# Patient Record
Sex: FEMALE | Race: BLACK OR AFRICAN AMERICAN | NOT HISPANIC OR LATINO | Employment: UNEMPLOYED | URBAN - METROPOLITAN AREA
[De-identification: names, ages, dates, MRNs, and addresses within clinical notes are randomized per-mention and may not be internally consistent; named-entity substitution may affect disease eponyms.]

---

## 2017-04-03 ENCOUNTER — OFFICE VISIT (OUTPATIENT)
Dept: OPHTHALMOLOGY | Facility: CLINIC | Age: 22
End: 2017-04-03
Attending: OPHTHALMOLOGY
Payer: COMMERCIAL

## 2017-04-03 ENCOUNTER — TELEPHONE (OUTPATIENT)
Dept: OPHTHALMOLOGY | Facility: CLINIC | Age: 22
End: 2017-04-03

## 2017-04-03 DIAGNOSIS — H04.123 DRY EYES, BILATERAL: ICD-10-CM

## 2017-04-03 DIAGNOSIS — H11.133: ICD-10-CM

## 2017-04-03 DIAGNOSIS — H16.423 PANNUS (CORNEAL), BILATERAL: ICD-10-CM

## 2017-04-03 DIAGNOSIS — H18.893 LIMBAL STEM CELL DEFICIENCY, BILATERAL: Primary | ICD-10-CM

## 2017-04-03 DIAGNOSIS — H16.9 KERATITIS: ICD-10-CM

## 2017-04-03 PROCEDURE — 92285 EXTERNAL OCULAR PHOTOGRAPHY: CPT | Mod: ZF | Performed by: OPHTHALMOLOGY

## 2017-04-03 PROCEDURE — 99214 OFFICE O/P EST MOD 30 MIN: CPT | Mod: 25

## 2017-04-03 PROCEDURE — 68761 CLOSE TEAR DUCT OPENING: CPT | Mod: ZF | Performed by: OPHTHALMOLOGY

## 2017-04-03 RX ORDER — CYCLOSPORINE 0.5 MG/ML
1 EMULSION OPHTHALMIC 2 TIMES DAILY
Qty: 3 BOX | Refills: 3 | Status: SHIPPED | OUTPATIENT
Start: 2017-04-03 | End: 2018-02-21 | Stop reason: DRUGHIGH

## 2017-04-03 ASSESSMENT — REFRACTION_WEARINGRX
OD_AXIS: 35
OD_SPHERE: -1.50
OS_CYLINDER: +0.75
OD_CYLINDER: +1.00
OS_AXIS: 100
OS_SPHERE: -1.50

## 2017-04-03 ASSESSMENT — VISUAL ACUITY
OD_SC+: -2
OD_PH_SC: 20/60+2
OS_SC: 20/40
OD_SC: 20/60
OS_SC+: +2
METHOD: SNELLEN - LINEAR

## 2017-04-03 ASSESSMENT — CUP TO DISC RATIO
OS_RATIO: 0.5
OD_RATIO: 0.4

## 2017-04-03 ASSESSMENT — TONOMETRY
OD_IOP_MMHG: 11
IOP_METHOD: ICARE
OS_IOP_MMHG: 14

## 2017-04-03 NOTE — MR AVS SNAPSHOT
After Visit Summary   4/3/2017    Chloe Cleaning    MRN: [id number]           Patient Information     Date Of Birth          1995        Visit Information        Provider Department      4/3/2017 7:30 AM Rey Gonzalez MD; [facility]        Today's Diagnoses     Limbal stem cell deficiency, bilateral    -  1    Dry eyes, bilateral        Conjunctival melanosis, bilateral        Pannus (corneal), bilateral        Keratitis - Both Eyes           Follow-ups after your visit        Follow-up notes from your care team     Return in about 2 weeks (around 4/17/2017) for Oculoplastics assessment for eyelid surgery.      Your next 10 appointments already scheduled     Apr 18, 2017  8:00 AM CDT   (Arrive by 7:45 AM)   NEW PLASTICS with Jessi Chapman MD   [facility] ([facility] [place])    [address] [place] [id number] [phone]              Who to contact     Please call your clinic at [phone] to:    Ask questions about your health    Make or cancel appointments    Discuss your medicines    Learn about your test results    Speak to your doctor   If you have compliments or concerns about an experience at your clinic, or if you wish to file a complaint, please contact [facility] Patient Relations at [phone] or email us at [email or web address]         Additional Information About Your Visit        MyChart Information     Kl[employer] is an electronic gateway that provides easy, online access to your medical records. With [employer], you can request a clinic appointment, read your test results, renew a prescription or communicate with your care team.     To sign up for [employer] visit the website at [email or web address]   You will be asked to enter the access code listed below, as well as some personal information. Please follow the directions to create your username and  password.     Your access code is: [email or web address]  Expires: 2017  8:30 AM     Your access code will  in 90 days. If you need help or a new code, please contact your [facility] or call [phone] for assistance.        Care EveryWhere ID     This is your Care EveryWhere ID. This could be used by other organizations to access your [place] medical records  [id number]         Blood Pressure from Last 3 Encounters:   10/24/16 112/70   16 112/70   16 100/72    Weight from Last 3 Encounters:   10/24/16 71.8 kg (158 lb 4.8 oz)   16 68 kg (150 lb)   16 74.5 kg (164 lb 3.2 oz)              We Performed the Following     Punctal Closure, Plugs     Slit Lamp Photos OU (both eyes)          Today's Medication Changes          These changes are accurate as of: 4/3/17  9:40 AM.  If you have any questions, ask your nurse or doctor.               These medicines have changed or have updated prescriptions.        Dose/Directions    * carboxymethylcellulose 1 % ophthalmic solution   Commonly known as:  CELLUVISC/REFRESH LIQUIGEL   This may have changed:  Another medication with the same name was added. Make sure you understand how and when to take each.   Used for:  Dry eyes, bilateral, Limbal stem cell deficiency, bilateral, Pannus (corneal), bilateral   Changed by:  Rey Gonzalez MD        Dose:  1 drop   Place 1 drop into both eyes 4 times daily Dispose of dropperette within 24 hours after opening or if the tip is contaminated.   Quantity:  90 each   Refills:  11       * carboxymethylcellulose 1 % ophthalmic solution   Commonly known as:  CELLUVISC/REFRESH LIQUIGEL   This may have changed:  Another medication with the same name was added. Make sure you understand how and when to take each.   Used for:  Limbal stem cell deficiency, bilateral, Dry eyes, bilateral   Changed by:  Rey Gonzalez MD        Dose:  1 drop   Place 1 drop into both eyes every  hour (while awake) Dispose of dropperette within 24 hours after opening or if the tip is contaminated.   Quantity:  90 each   Refills:  4       * carboxymethylcellulose 1 % ophthalmic solution   Commonly known as:  CELLUVISC/REFRESH LIQUIGEL   This may have changed:  You were already taking a medication with the same name, and this prescription was added. Make sure you understand how and when to take each.   Used for:  Dry eyes, bilateral, Limbal stem cell deficiency, bilateral   Changed by:  Rey Gonzalez MD        Dose:  1 drop   Place 1 drop into both eyes every hour (while awake) Dispose of dropperette within 24 hours after opening or if the tip is contaminated.   Quantity:  90 each   Refills:  4       * cycloSPORINE 0.05 % ophthalmic emulsion   Commonly known as:  RESTASIS   This may have changed:  Another medication with the same name was added. Make sure you understand how and when to take each.   Used for:  Dry eyes, bilateral, Limbal stem cell deficiency, bilateral, Keratitis, Pannus (corneal), bilateral   Changed by:  Rey Gonzalez MD        Dose:  1 drop   Place 1 drop into both eyes 2 times daily Note: it is normal for these eye drops to burn. Discard individual dropperettes within 24 hours of opening or if tip is contaminated.   Quantity:  1 each   Refills:  11       * cycloSPORINE 0.05 % ophthalmic emulsion   Commonly known as:  RESTASIS   This may have changed:  You were already taking a medication with the same name, and this prescription was added. Make sure you understand how and when to take each.   Used for:  Dry eyes, bilateral, Limbal stem cell deficiency, bilateral   Changed by:  Rey Gonzalez MD        Dose:  1 drop   Place 1 drop into both eyes 2 times daily   Quantity:  3 Box   Refills:  3       * Notice:  This list has 5 medication(s) that are the same as other medications prescribed for you. Read the directions carefully, and ask your doctor or other care provider to  review them with you.         Where to get your medicines      These medications were sent to [facility] [address] [place] [address] - [address] [facility]  [address] [facility] [address]     Phone:  [phone]     carboxymethylcellulose 1 % ophthalmic solution    cycloSPORINE 0.05 % ophthalmic emulsion                Primary Care Provider    Physician No Ref-Primary       No address on file        Thank you!     Thank you for choosing EYE CLINIC  for your care. Our goal is always to provide you with excellent care. Hearing back from our patients is one way we can continue to improve our services. Please take a few minutes to complete the written survey that you may receive in the mail after your visit with us. Thank you!             Your Updated Medication List - Protect others around you: Learn how to safely use, store and throw away your medicines at www.disposemymeds.org.          This list is accurate as of: 4/3/17  9:40 AM.  Always use your most recent med list.                   Brand Name Dispense Instructions for use    ADVIL PO          * carboxymethylcellulose 1 % ophthalmic solution    CELLUVISC/REFRESH LIQUIGEL    90 each    Place 1 drop into both eyes 4 times daily Dispose of dropperette within 24 hours after opening or if the tip is contaminated.       * carboxymethylcellulose 1 % ophthalmic solution    CELLUVISC/REFRESH LIQUIGEL    90 each    Place 1 drop into both eyes every hour (while awake) Dispose of dropperette within 24 hours after opening or if the tip is contaminated.       * carboxymethylcellulose 1 % ophthalmic solution    CELLUVISC/REFRESH LIQUIGEL    90 each    Place 1 drop into both eyes every hour (while awake) Dispose of dropperette within 24 hours after opening or if the tip is contaminated.       * cycloSPORINE 0.05 % ophthalmic emulsion    RESTASIS    1 each    Place 1 drop into both eyes 2 times daily Note: it is normal for these eye drops to burn. Discard  individual dropperettes within 24 hours of opening or if tip is contaminated.       * cycloSPORINE 0.05 % ophthalmic emulsion    RESTASIS    3 Box    Place 1 drop into both eyes 2 times daily       erythromycin ophthalmic ointment    ROMYCIN    2 Tube    Place 1 Application into both eyes At Bedtime Ok to use throughout the day for comfort       fluorometholone 0.1 % ophthalmic susp    FML LIQUIFILM    15 mL    Place 1 drop into both eyes 4 times daily       * HYDROcodone-acetaminophen 5-325 MG per tablet    NORCO    15 tablet    Take 1-2 tablets by mouth every 4 hours as needed for moderate to severe pain       * HYDROcodone-acetaminophen 5-325 MG per tablet    NORCO    20 tablet    Take 1-2 tablets by mouth every 4 hours as needed       * hypromellose 0.3 % Gel ophthalmic gel    GENTEAL    10 g    Place 0.5 g (0.5 inches) into both eyes At Bedtime Apply approximately a half inch ribbon of ointment to the inside of your lower lids. Warning, application of the ointment to your eyes will cause temporary blurring of your vision from the ointment coating your eye. Please apply right before bedtime.       * hypromellose 0.3 % Gel ophthalmic gel    GENTEAL    10 g    Place 0.5 g (0.5 inches) into both eyes At Bedtime Apply approximately a half inch ribbon of ointment to the inside of your lower lids. Warning, application of the ointment to your eyes will cause temporary blurring of your vision from the ointment coating your eye. Please apply right before bedtime.       SUMAtriptan 100 MG tablet    IMITREX         TYLENOL PO          * Notice:  This list has 9 medication(s) that are the same as other medications prescribed for you. Read the directions carefully, and ask your doctor or other care provider to review them with you.

## 2017-04-03 NOTE — PROGRESS NOTES
CC: LSCD OU    HPI: 21 year old [place] female referred by Dr. Santizo for evaluation.  She states that both eyes have been tearing and painful for over 10 years.    Interval History: Eyes red, itching, burning, dry eyes - artificial tears help. No flashes, floaters, diplopia. Patient feels vision is about the same    Previously using:  PFATs QID OU  Restasis BID OU    Assessment and Plan:    1. Limbal Stem Cell Deficiency, both eyes. Baseline slit lamp photos 11/2015.   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 Mac   - restart Preservative free artificial tears Q1H OU   - continue Restasis OU twice a day    - minimize all ocular surface toxicity   - recommend replacing punctal plugs collagen BLL today - patient wishes to proceed   - May be a candidate for SK with AMT grafting if fails medical treatment to reverse early LSCD   - would recommend evaluation with oculoplastics for lifting lower lids - chronic exposure due to LL lag    ~~~~~~~~~~~~~~~~~~~~~~~~~~~~~~~~~~~~~~~~~~~~~~~~~~~~~~~~~~~~~~~~    Complete documentation of historical and exam elements from today's encounter can be found in the full encounter summary report (not reduplicated in this progress note). I personally obtained the chief complaint(s) and history of present illness.  I confirmed and edited as necessary the review of systems, past medical/surgical history, family history, social history, and examination findings as documented by others; and I examined the patient myself. I personally reviewed the relevant tests, images, and reports as documented above. I formulated and edited as necessary the assessment and plan and discussed the findings and management  plan with the patient and family.    I personally viewed the [imaging] and I agree with the interpretation as documented by the resident/fellow and edited by me as appropriate.    I was present for the entire procedure.      Rey Gonzalez MD        I personally spent great than 40min with the patient, of which >50% of the time was spent face to face with the patient, counseling and coordinating care with the patient. We discussed the complexity of her diagnosis, the need for further information prior to proceeding with yet another surgery, and the unknown prognosis for the patient at this time.    Rey Gonzalez MD

## 2017-04-03 NOTE — NURSING NOTE
Chief Complaints and History of Present Illnesses   Patient presents with     Follow Up For     6 month follow up both eyes.     HPI    Affected eye(s):  Both   Symptoms:     No floaters   No flashes   No redness   Dryness         Do you have eye pain now?:  No      Comments:  Pt states that vision is not as clear as it used to be. Pt feeling irritation in both eyes, but does get relief with drops.    Tatianna CASTILLO April 3, 2017 7:59 AM

## 2017-05-02 ENCOUNTER — OFFICE VISIT (OUTPATIENT)
Dept: OPHTHALMOLOGY | Facility: CLINIC | Age: 22
End: 2017-05-02

## 2017-05-02 DIAGNOSIS — H16.9 KERATITIS: ICD-10-CM

## 2017-05-02 DIAGNOSIS — H16.423 PANNUS (CORNEAL), BILATERAL: ICD-10-CM

## 2017-05-02 DIAGNOSIS — H11.133: ICD-10-CM

## 2017-05-02 DIAGNOSIS — H02.539 EYELID RETRACTION OR LAG: Primary | ICD-10-CM

## 2017-05-02 DIAGNOSIS — H18.893 LIMBAL STEM CELL DEFICIENCY, BILATERAL: ICD-10-CM

## 2017-05-02 ASSESSMENT — MARGIN REFLEX DISTANCE
OS_MRD2: 8
OD_MRD1: -2
OS_MRD1: -2
OD_MRD2: 8

## 2017-05-02 ASSESSMENT — TONOMETRY
OS_IOP_MMHG: 15
IOP_METHOD: ICARE
OD_IOP_MMHG: 12

## 2017-05-02 ASSESSMENT — VISUAL ACUITY
OD_SC+: -3
OS_SC+: -1
METHOD: SNELLEN - LINEAR
OD_SC: 20/40
OS_SC: 20/50

## 2017-05-02 ASSESSMENT — LAGOPHTHALMOS
OD_LAGOPHTHALMOS: 0
OS_LAGOPHTHALMOS: 0

## 2017-05-02 ASSESSMENT — CONF VISUAL FIELD
OS_NORMAL: 1
OD_NORMAL: 1

## 2017-05-02 ASSESSMENT — LEVATOR FUNCTION
OD_LEVATOR: 8
OS_LEVATOR: 8

## 2017-05-02 NOTE — LETTER
2017         RE:  :  MRN: Chloe Cleaning  1995  [id number]     Dear Dr. Gonzalez,    Thank you for allowing me to see your patient, Chloe Cleaning, for an oculoplastic   consultation.  My assessment and plan are below.  For further details, please see my attached clinic note.      Chief Complaints and History of Present Illnesses   Patient presents with     Follow Up For       chronic exposure due to LL lag      Referral for evaluation of both lower eyelid retraction in the setting of limbal stem cell deficiency and corneal scars. Her mother notes it has been many years she has had trouble with her eyes. Maybe since she was 7-8 years of age. Her mother provides nearly all of the history through the  today. She did follow commands but did not personally answer any of my questions.     She does have a history of a neurological condition, for which she is being evaluated at [facility]. I do not have access to their notes, but a recent MRI report from [facility] mentions numerous white matter lesions possibly indicative of ADEM.            Assessment & Plan     Chloe Cleaning is a 21 year old female with the following diagnoses:   1. Eyelid retraction or lag - Both Eyes    2. Limbal stem cell deficiency, bilateral - Both Eyes    3. Conjunctival melanosis, bilateral - Both Eyes    4. Pannus (corneal), bilateral - Both Eyes    5. Keratitis - Both Eyes       While she has significant upper eyelid ptosis as well, we discussed that should not be addressed until her lower eyelids are in improved position and her cornea is in better shape.     Plan:  Left lower eyelid retraction repair with acellular dermis graft and temporary tarsorrhaphy followed several weeks later with right lower eyelid retraction repair in a similar fashion.             Again, thank you for allowing me to participate in the care of your patient.      Sincerely,    Jessi Chapman MD  Department of Ophthalmology and Visual Neurosciences  [facility]  [place]    CC: Rey Gonzalez MD  [facility]  [address] [address]  VIA In Basket

## 2017-05-02 NOTE — MR AVS SNAPSHOT
After Visit Summary   5/2/2017    Chloe Cleaning    MRN: [id number]           Patient Information     Date Of Birth          1995        Visit Information        Provider Department      5/2/2017 7:45 AM Jessi Chapman MD; [facility]        Today's Diagnoses     Eyelid retraction or lag - Both Eyes    -  1    Limbal stem cell deficiency, bilateral - Both Eyes        Conjunctival melanosis, bilateral - Both Eyes        Pannus (corneal), bilateral - Both Eyes        Keratitis - Both Eyes           Follow-ups after your visit        Your next 10 appointments already scheduled     May 08, 2017  7:30 AM CDT   (Arrive by 7:15 AM)   PAC EVALUATION with  Pac Blaze 5   [facility] [place] ([facility])    [address] [place] [id number] [phone]            May 08, 2017  8:30 AM CDT   (Arrive by 8:15 AM)   PAC RN ASSESSMENT with  Pac Rn   [facility] [place] ([facility])    [address] [place] [id number] [phone]            May 08, 2017  9:00 AM CDT   (Arrive by 8:45 AM)   PAC Anesthesia Consult with  Pac Anesthesiologist   [facility] [place] ([facility])    [address] [place] [id number] [phone]            May 23, 2017 10:45 AM CDT   (Arrive by 10:30 AM)   Post-Op with Jessi Chapman MD   [facility] ([facility])    [address] [place] [id number] [phone]            Jun 20, 2017 11:00 AM CDT   (Arrive by 10:45 AM)   Post-Op with Jessi Chapman MD   [facility] ([facility])    [address] [place] [id number] [phone]              Who to contact     Please call your clinic at [phone]  to:    Ask questions about your health    Make or cancel appointments    Discuss your medicines    Learn about your test results    Speak to your doctor   If you have compliments or concerns about an experience at your clinic, or if you wish to file a complaint, please contact [facility] Patient Relations at [phone] or email us at [email or web address]         Additional Information About Your Visit        Assurelyhart Information     My Digital Lif[employer] is an electronic gateway that provides easy, online access to your medical records. With "[employer]", you can request a clinic appointment, read your test results, renew a prescription or communicate with your care team.     To sign up for "[employer]" visit the website at [email or web address]   You will be asked to enter the access code listed below, as well as some personal information. Please follow the directions to create your username and password.     Your access code is: [email or web address]  Expires: 2017  8:30 AM     Your access code will  in 90 days. If you need help or a new code, please contact your [facility] or call [phone] for assistance.        Care EveryWhere ID     This is your Care EveryWhere ID. This could be used by other organizations to access your [place] medical records  [id number]         Blood Pressure from Last 3 Encounters:   10/24/16 112/70   16 112/70   16 100/72    Weight from Last 3 Encounters:   10/24/16 71.8 kg (158 lb 4.8 oz)   16 68 kg (150 lb)   16 74.5 kg (164 lb 3.2 oz)              We Performed the Following     External Photos OU (both eyes)     Carolee-Operative Worksheet (Plastics)        Primary Care Provider    Physician No Ref-Primary       No address on file        Thank you!     Thank you for choosing [facility] OPHTHALMOLOGY  for your care. Our goal is always to provide you with excellent care. Hearing back from our patients is one  way we can continue to improve our services. Please take a few minutes to complete the written survey that you may receive in the mail after your visit with us. Thank you!             Your Updated Medication List - Protect others around you: Learn how to safely use, store and throw away your medicines at www.disposemymeds.org.          This list is accurate as of: 5/2/17  8:48 AM.  Always use your most recent med list.                   Brand Name Dispense Instructions for use    ADVIL PO          * carboxymethylcellulose 1 % ophthalmic solution    CELLUVISC/REFRESH LIQUIGEL    90 each    Place 1 drop into both eyes 4 times daily Dispose of dropperette within 24 hours after opening or if the tip is contaminated.       * carboxymethylcellulose 1 % ophthalmic solution    CELLUVISC/REFRESH LIQUIGEL    90 each    Place 1 drop into both eyes every hour (while awake) Dispose of dropperette within 24 hours after opening or if the tip is contaminated.       * carboxymethylcellulose 1 % ophthalmic solution    CELLUVISC/REFRESH LIQUIGEL    90 each    Place 1 drop into both eyes every hour (while awake) Dispose of dropperette within 24 hours after opening or if the tip is contaminated.       * cycloSPORINE 0.05 % ophthalmic emulsion    RESTASIS    1 each    Place 1 drop into both eyes 2 times daily Note: it is normal for these eye drops to burn. Discard individual dropperettes within 24 hours of opening or if tip is contaminated.       * cycloSPORINE 0.05 % ophthalmic emulsion    RESTASIS    3 Box    Place 1 drop into both eyes 2 times daily       erythromycin ophthalmic ointment    ROMYCIN    2 Tube    Place 1 Application into both eyes At Bedtime Ok to use throughout the day for comfort       fluorometholone 0.1 % ophthalmic susp    FML LIQUIFILM    15 mL    Place 1 drop into both eyes 4 times daily       * HYDROcodone-acetaminophen 5-325 MG per tablet    NORCO    15 tablet    Take 1-2 tablets by mouth every 4 hours as  needed for moderate to severe pain       * HYDROcodone-acetaminophen 5-325 MG per tablet    NORCO    20 tablet    Take 1-2 tablets by mouth every 4 hours as needed       * hypromellose 0.3 % Gel ophthalmic gel    GENTEAL    10 g    Place 0.5 g (0.5 inches) into both eyes At Bedtime Apply approximately a half inch ribbon of ointment to the inside of your lower lids. Warning, application of the ointment to your eyes will cause temporary blurring of your vision from the ointment coating your eye. Please apply right before bedtime.       * hypromellose 0.3 % Gel ophthalmic gel    GENTEAL    10 g    Place 0.5 g (0.5 inches) into both eyes At Bedtime Apply approximately a half inch ribbon of ointment to the inside of your lower lids. Warning, application of the ointment to your eyes will cause temporary blurring of your vision from the ointment coating your eye. Please apply right before bedtime.       SUMAtriptan 100 MG tablet    IMITREX         TYLENOL PO          * Notice:  This list has 9 medication(s) that are the same as other medications prescribed for you. Read the directions carefully, and ask your doctor or other care provider to review them with you.

## 2017-05-02 NOTE — PROGRESS NOTES
Chief Complaints and History of Present Illnesses   Patient presents with     Follow Up For     chronic exposure due to LL lag     Referral for evaluation of both lower eyelid retraction in the setting of limbal stem cell deficiency and corneal scars. Her mother notes it has been many years she has had trouble with her eyes. Maybe since she was 7-8 years of age. Her mother provides nearly all of the history through the  today. She did follow commands but did not personally answer any of my questions.     She does have a history of a neurological condition, for which she is being evaluated at [facility]. I do not have access to their notes, but a recent MRI report from [facility] mentions numerous white matter lesions possibly indicative of ADEM.          Assessment & Plan     Chloe Cleaning is a 21 year old female with the following diagnoses:   1. Eyelid retraction or lag - Both Eyes    2. Limbal stem cell deficiency, bilateral - Both Eyes    3. Conjunctival melanosis, bilateral - Both Eyes    4. Pannus (corneal), bilateral - Both Eyes    5. Keratitis - Both Eyes       While she has significant upper eyelid ptosis as well, we discussed that should not be addressed until her lower eyelids are in improved position and her cornea is in better shape.     Plan:  Left lower eyelid retraction repair with acellular dermis graft and temporary tarsorrhaphy followed several weeks later with right lower eyelid retraction repair in a similar fashion.              Complete documentation of historical and exam elements from today's encounter can be found in the full encounter summary report (not reduplicated in this progress note). I personally obtained the chief complaint(s) and history of present illness.  I confirmed and edited as necessary the review of systems, past medical/surgical history, family history, social history, and examination findings as documented by others; and I examined the patient myself. I personally reviewed  the relevant tests, images, and reports as documented above. I formulated and edited as necessary the assessment and plan and discussed the findings and management plan with the patient and family. - Jessi Chapman MD    Today with Chloe Cleaning  and her mother and , I reviewed the indications, risks, benefits, and alternatives of the proposed surgical procedure including, but not limited to, failure obtain the desired result  and need for additional surgery, bleeding, infection, loss of vision, loss of the eye, and the remote possibility of permanent damage to any organ system or death with the use of anesthesia.  I provided multiple opportunities for the questions, answered all questions to the best of my ability, and confirmed that my answers and my discussion were understood.   Jessi Chapman

## 2017-05-02 NOTE — NURSING NOTE
Chief Complaints and History of Present Illnesses   Patient presents with     Follow Up For     chronic exposure due to LL lag     HPI    Affected eye(s):  Both   Symptoms:     Blurred vision   No floaters   No flashes   Redness   Tearing   Dryness   Itching         Do you have eye pain now?:  Yes   Location:  OU   Pain Level:  Worst Pain (10)   Pain Frequency:  Constant      Comments:  eval for Lifting lower lids    Patient is using Restasis BE BID  PFAT BE Q1h    Tasneem Garcia May 2, 2017 8:10 AM

## 2017-05-08 ENCOUNTER — ALLIED HEALTH/NURSE VISIT (OUTPATIENT)
Dept: SURGERY | Facility: CLINIC | Age: 22
End: 2017-05-08

## 2017-05-08 ENCOUNTER — ANESTHESIA EVENT (OUTPATIENT)
Dept: SURGERY | Facility: AMBULATORY SURGERY CENTER | Age: 22
End: 2017-05-08

## 2017-05-08 ENCOUNTER — OFFICE VISIT (OUTPATIENT)
Dept: SURGERY | Facility: CLINIC | Age: 22
End: 2017-05-08

## 2017-05-08 VITALS
HEIGHT: 61 IN | RESPIRATION RATE: 20 BRPM | OXYGEN SATURATION: 100 % | WEIGHT: 147.8 LBS | HEART RATE: 80 BPM | TEMPERATURE: 98.1 F | SYSTOLIC BLOOD PRESSURE: 112 MMHG | DIASTOLIC BLOOD PRESSURE: 79 MMHG | BODY MASS INDEX: 27.9 KG/M2

## 2017-05-08 DIAGNOSIS — H02.532 EYELID RETRACTION RIGHT LOWER EYELID: ICD-10-CM

## 2017-05-08 DIAGNOSIS — Z01.818 PRE-OP EXAMINATION: Primary | ICD-10-CM

## 2017-05-08 DIAGNOSIS — H02.535 EYELID RETRACTION LEFT LOWER EYELID: Primary | ICD-10-CM

## 2017-05-08 NOTE — ANESTHESIA PREPROCEDURE EVALUATION
Anesthesia Evaluation     . Pt has not had prior anesthetic            ROS/MED HX    ENT/Pulmonary:  - neg pulmonary ROS     Neurologic: Comment: Patient was evaluated at the [facility] for Migraines when she first came to [facility] two years ago.  MRI report from [facility] mentions numerous white matter lesions indicative of ADEM.  Tylenol works for her migraines.      (+)migraines,    (-) Multiple Sclerosis (patient denies)   Cardiovascular:  - neg cardiovascular ROS   (+) ----. : . . . :. . No previous cardiac testing       METS/Exercise Tolerance:  >4 METS   Hematologic: Comments: Hgb 11.7 7/4/2016        Musculoskeletal:   (+) , fracture lower extremity: Ankle, -       GI/Hepatic:        Other GI/Hepatic: h/o constipation.   Renal/Genitourinary:  - ROS Renal section negative       Endo:  - neg endo ROS       Psychiatric:  - neg psychiatric ROS       Infectious Disease:  - neg infectious disease ROS       Malignancy:      - no malignancy   Other:    (+) C-spine cleared: N/A, no H/O Chronic Pain,no other significant disability   - neg other ROS                 Physical Exam  Normal systems: cardiovascular and dental    Airway   Mallampati: I  TM distance: >3 FB  Neck ROM: full    Dental     Cardiovascular   Rhythm and rate: regular and normal      Pulmonary    breath sounds clear to auscultation    Other findings: Physical Exam:  Ptosis bilaterally    Lab Results      Component                Value               Date                      WBC                      6.8                 07/14/2016            Lab Results      Component                Value               Date                      RBC                      4.55                07/14/2016            Lab Results      Component                Value               Date                      HGB                      11.7                07/14/2016            Lab Results      Component                Value               Date                      HCT                       35.3                07/14/2016            Lab Results      Component                Value               Date                      MCV                      78                  07/14/2016            Lab Results      Component                Value               Date                      MCH                      25.7                07/14/2016            Lab Results      Component                Value               Date                      MCHC                     33.1                07/14/2016            Lab Results      Component                Value               Date                      RDW                      16.2                07/14/2016            Lab Results      Component                Value               Date                      PLT                      291                 07/14/2016              Last Basic Metabolic Panel:  Lab Results      Component                Value               Date                      NA                       140                 07/14/2016             Lab Results      Component                Value               Date                      POTASSIUM                3.5                 07/14/2016            Lab Results      Component                Value               Date                      CHLORIDE                 105                 07/14/2016            Lab Results      Component                Value               Date                      MARGARITA                      9.2                 07/14/2016            Lab Results      Component                Value               Date                      CO2                      22                  07/14/2016            Lab Results      Component                Value               Date                      BUN                      15                  07/14/2016            Lab Results      Component                Value               Date                      CR                       0.70                07/14/2016            Lab Results       Component                Value               Date                      GLC                      84                  07/14/2016                     PAC Discussion and Assessment    ASA Classification: 2  Case is suitable for: ASC  Anesthetic techniques and relevant risks discussed: PAC Recommendations anesthetic techniques: MAC with local anesthetic.  Invasive monitoring and risk discussed: No  Types:   Possibility and Risk of blood transfusion discussed: No  NPO instructions given:   Additional anesthetic preparation and risks discussed:   Needs early admission to pre-op area:   Other:     PAC Resident/NP Anesthesia Assessment:  Chloe Cleaning is a 21 [facility]  female scheduled for Left Lower Eyelid Retraction Repair with Acellular Dermis Graft and Temporary Tarsorrhaphy with Dr. Chapman on 6/8/2017 at [facility] [place] with combined MAC and local anesthesia as a same day surgery.    Ms. Cleaning was referred on 5/2/2017 to Dr. 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  Ms. Cleaning presents to PAC clinic with her mom and an .  She reports a history of migraines and constipation, but denies any cardiac or pulmonary history.  She denies chest pain, dyspnea, fever, chills, cough, sore throat, dysuria, hematuria and denies any change in bowel or bladder habits.  Ms. Cleaning would like to proceed with above procedure.  This is the first stage of a two stage procedure. She will return at a later date for right lower eyelid retraction repair in a similar fashion.    PAC referral for risk assessment and optimization of anesthesia with comorbid conditions of:  Limbal stem cell deficiency; corneal scars; migraines; neurological condition; & constipation.    Cardiology - RCRI : No serious cardiac risks.  0.4 % risk of major adverse cardiac event. METS >4.  Denies any cardiac history or  symptoms.  Pulmonary - no smoking.  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  Surgery planned as in HPI.    She has the following specific operative considerations:   - Anesthesia considerations:  Refer to PAC assessment in anesthesia records  - VTE risk:  0.26%  - VA # of risks 0/8 = low risk  - Risk of PONV score = 2.  If > 2, anti-emetic intervention recommended.    Arrival time, NPO, shower and medication instructions provided by nursing staff today.  Preparing For Your Surgery handout given.  Patient was discussed with Dr Humphrey. I spent 20 minutes face to face with patient, assessing, examining, and educating.      Reviewed and Signed by PAC Mid-Level Provider/Resident  Mid-Level Provider/Resident: Jessie BUCKLEY CNP  Date: 5/8/2017  Time: 8:30    Attending Anesthesiologist Anesthesia Assessment:  21 year old for eyelid surgery for eyelid retraction. Chart reviewed, patient seen and evaluated; agree with above assessment. Patient is non-English speaking and concerned re anxiety during procedure. She is otherwise healthy so should tolerate MAC well.     Patient is appropriate for the planned procedure without further workup or medical management change. The final anesthesia plan will be determined by the physician anesthesiologist caring for the patient on the day of surgery.      Reviewed and Signed by PAC Anesthesiologist  Anesthesiologist: roel  Date: 5/8/2017  Time:   Pass/Fail: Pass  Disposition:     PAC Pharmacist Assessment:        Pharmacist:   Date:   Time:      Anesthesia Plan      History & Physical Review  History and physical reviewed and following examination; no interval change.    ASA Status:  1 .    NPO  Status:  > 8 hours    Plan for General and LMA with Intravenous and Propofol induction. Maintenance will be Balanced.    PONV prophylaxis:  Ondansetron (or other 5HT-3) and Dexamethasone or Solumedrol       Postoperative Care  Postoperative pain management:  Multi-modal analgesia.      Consents  Anesthetic plan, risks, benefits and alternatives discussed with:  Patient.  Use of blood products discussed: No .   .                          .

## 2017-05-08 NOTE — PATIENT INSTRUCTIONS
Preparing for Your Surgery      Name:  Chloe Cleaning   MRN:  [id number]   :  1995   Today's Date:  2017     Arriving for surgery:  Surgery date:  17  Surgery time:  12:25 pm  Arrival time:  11 am    Please come to:   [facility]  [address] [place] [id number]     Parking is available in front of the Clinics and Surgery Center building from 5:30AM to 8:00PM.  -  Proceed to the 5th floor to check into the Ambulatory Surgery Center.              >> There will be patient concierges on the 1st and 5th floor, for assistance or an escort, if you would like.              >> Please call [phone] with any questions.  -  Bring your ID and insurance card.    What can I eat or drink?  -  You may have solid food or milk products until 8 hours prior to your surgery. (4:25 am)  -  You may have water, apple juice, BLACK coffee (NO creamer or nondairy creamer), or 7up/Sprite until 2 hours prior to your surgery. (10:25 am)    Which medicines can I take?  -Do not bring your own medications to the hospital, except for eye drops.        -  Follow Eye Clinic instructions regarding Ibuprofen. If no instructions given, NO Ibuprofen the day prior to surgery.        -  No Imitrex the day prior to surgery.    -  Please take these medications the morning of surgery:  Pt reports she takes 2 eye drops. Unsure of the names. Will bring eye drops the day of surgery. May take both eye drops the morning of surgery.  Acetaminophen (Tylenol) if needed    How do I prepare myself?  -  Take two showers: one the night before surgery; and one the morning of surgery.         Use Scrubcare or Hibiclens to wash from neck down.  You may use your own shampoo and conditioner. No other hair products.   -  Do NOT use lotion, powder, deodorant, or antiperspirant the day of your surgery.  -  Do NOT wear any makeup, fingernail polish or jewelry.    Questions or Concerns:  If you have questions or concerns,  please call the  Preoperative Assessment Center, Monday-Friday 7AM-7PM:  [phone]      AFTER YOUR SURGERY  Breathing exercises   Breathing exercises help you recover faster. Take deep breaths and let the air out slowly. This will:     Help you wake up after surgery.    Help prevent complications like pneumonia.  Preventing complications will help you go home sooner.   Nausea and vomiting   You may feel sick to your stomach after surgery; if so, let your nurse know.    Pain control:  After surgery, you may have pain. Our goal is to help you manage your pain. Pain medicine will help you feel comfortable enough to do activities that will help you heal.  These activities may include breathing exercises, walking and physical therapy.   To help your health care team treat your pain we will ask: 1) If you have pain  2) where it is located 3) describe your pain in your words  Methods of pain control include medications given by mouth, vein or by nerve block for some surgeries.  Sequential Compression Device (SCD) or Pneumo Boots:  You may need to wear SCD S on your legs or feet. These are wraps connected to a machine that pumps in air and releases it. The repeated pumping helps prevent blood clots from forming.

## 2017-05-08 NOTE — MR AVS SNAPSHOT
After Visit Summary   2017    Chloe Cleaning    MRN: [id number]           Patient Information     Date Of Birth          1995        Visit Information        Provider Department      2017 8:30 AM Rn, [facility]        Care Instructions    Preparing for Your Surgery      Name:  Chloe Cleaning   MRN:  [id number]   :  1995   Today's Date:  2017     Arriving for surgery:  Surgery date:  17  Surgery time:  12:25 pm  Arrival time:  11 am    Please come to:   [facility]  [address] [place] [id number]     Parking is available in front of the Clinics and Surgery Center building from 5:30AM to 8:00PM.  -  Proceed to the 5th floor to check into the Ambulatory Surgery Center.              >> There will be patient concierges on the 1st and 5th floor, for assistance or an escort, if you would like.              >> Please call [phone] with any questions.  -  Bring your ID and insurance card.    What can I eat or drink?  -  You may have solid food or milk products until 8 hours prior to your surgery. (4:25 am)  -  You may have water, apple juice, BLACK coffee (NO creamer or nondairy creamer), or 7up/Sprite until 2 hours prior to your surgery. (10:25 am)    Which medicines can I take?  -Do not bring your own medications to the hospital, except for eye drops.        -  Follow Eye Clinic instructions regarding Ibuprofen. If no instructions given, NO Ibuprofen the day prior to surgery.        -  No Imitrex the day prior to surgery.    -  Please take these medications the morning of surgery:  Pt reports she takes 2 eye drops. Unsure of the names. Will bring eye drops the day of surgery. May take both eye drops the morning of surgery.  Acetaminophen (Tylenol) if needed    How do I prepare myself?  -  Take two showers: one the night before surgery; and one the morning of surgery.         Use Scrubcare or Hibiclens to  wash from neck down.  You may use your own shampoo and conditioner. No other hair products.   -  Do NOT use lotion, powder, deodorant, or antiperspirant the day of your surgery.  -  Do NOT wear any makeup, fingernail polish or jewelry.    Questions or Concerns:  If you have questions or concerns, please call the  Preoperative Assessment Center, Monday-Friday 7AM-7PM:  [phone]      AFTER YOUR SURGERY  Breathing exercises   Breathing exercises help you recover faster. Take deep breaths and let the air out slowly. This will:     Help you wake up after surgery.    Help prevent complications like pneumonia.  Preventing complications will help you go home sooner.   Nausea and vomiting   You may feel sick to your stomach after surgery; if so, let your nurse know.    Pain control:  After surgery, you may have pain. Our goal is to help you manage your pain. Pain medicine will help you feel comfortable enough to do activities that will help you heal.  These activities may include breathing exercises, walking and physical therapy.   To help your health care team treat your pain we will ask: 1) If you have pain  2) where it is located 3) describe your pain in your words  Methods of pain control include medications given by mouth, vein or by nerve block for some surgeries.  Sequential Compression Device (SCD) or Pneumo Boots:  You may need to wear SCD S on your legs or feet. These are wraps connected to a machine that pumps in air and releases it. The repeated pumping helps prevent blood clots from forming.                 Follow-ups after your visit        Your next 10 appointments already scheduled     May 08, 2017  9:00 AM CDT   (Arrive by 8:45 AM)   PAC Anesthesia Consult with  Pac Anesthesiologist   [facility] ([facility])    [address] [place] [id number] [phone]            May 08, 2017  9:15 AM CDT   LAB with UC LAB   [facility]  Lab ([facility])    [address] [place] [address]  1st [facility] [id number] [phone]           Patient must bring picture ID.  Patient should be prepared to give a urine specimen  Please do not eat 10-12 hours before your appointment if you are coming in fasting for labs on lipids, cholesterol, or glucose (sugar).  Pregnant women should follow their Care Team instructions. Water with medications is okay. Do not drink coffee or other fluids.   If you have concerns about taking  your medications, please ask at office or if scheduling via Greetz, send a message by clicking on Secure Messaging, Message Your Care Team.            May 11, 2017   Procedure with Jessi Chapman MD   [facility] ([facility])    [address] [place] [address]  5th [facility] [id number] [phone]           Located in the [facility] at [address] [place] [address].   parking is very convenient and highly recommended.  is a $6 flat rate fee.  Both  and self parkers should enter the main arrival plaza from [facility]; parking attendants will direct you based on your parking preference.            May 16, 2017 10:45 AM CDT   (Arrive by 10:30 AM)   Post-Op with Jessi Chapman MD   [facility] ([facility])    [address] [place] [address]  4th [facility] [id number] [phone]            Jun 08, 2017   Procedure with Jessi Chapman MD   [facility] ([facility])    [address] [place] [address]  5th [facility] [id number] [phone]           Located in the [facility] at [address] [place] [address].   parking is very convenient and highly recommended.  is a $6 flat rate fee.  Both  and self parkers should enter the main arrival plaza from [facility]; parking  attendants will direct you based on your parking preference.            2017 11:00 AM CDT   (Arrive by 10:45 AM)   Post-Op with Jessi Chapman MD   [facility]hthal[facility] ([facility])    [address] [address] [phone]              Who to contact     Please call your clinic at [phone] to:    Ask questions about your health    Make or cancel appointments    Discuss your medicines    Learn about your test results    Speak to your doctor   If you have compliments or concerns about an experience at your clinic, or if you wish to file a complaint, please contact [facility] Patient Relations at [phone] or email us at [email or web address]         Additional Information About Your Visit        Standout [employer] Payments Information     Who Works Around You is an electronic gateway that provides easy, online access to your medical records. With Who Works Around You, you can request a clinic appointment, read your test results, renew a prescription or communicate with your care team.     To sign up for Who Works Around You visit the website at [email or web address]   You will be asked to enter the access code listed below, as well as some personal information. Please follow the directions to create your username and password.     Your access code is: [email or web address]  Expires: 2017  8:30 AM     Your access code will  in 90 days. If you need help or a new code, please contact your [facility] or call [phone] for assistance.        Care EveryWhere ID     This is your Care EveryWhere ID. This could be used by other organizations to access your [place] medical records  [id number]        Your Vitals Were     Last Period                   2017 (Approximate)            Blood Pressure from Last 3 Encounters:   17 112/79   10/24/16 112/70   16 112/70    Weight from Last 3 Encounters:   17 67 kg  (147 lb 12.8 oz)   10/24/16 71.8 kg (158 lb 4.8 oz)   07/14/16 68 kg (150 lb)              Today, you had the following     No orders found for display       Primary Care Provider    Physician No Ref-Primary       No address on file        Thank you!     Thank you for choosing [facility]  for your care. Our goal is always to provide you with excellent care. Hearing back from our patients is one way we can continue to improve our services. Please take a few minutes to complete the written survey that you may receive in the mail after your visit with us. Thank you!             Your Updated Medication List - Protect others around you: Learn how to safely use, store and throw away your medicines at www.disposemymeds.org.          This list is accurate as of: 5/8/17  8:58 AM.  Always use your most recent med list.                   Brand Name Dispense Instructions for use    * carboxymethylcellulose 1 % ophthalmic solution    CELLUVISC/REFRESH LIQUIGEL    90 each    Place 1 drop into both eyes 4 times daily Dispose of dropperette within 24 hours after opening or if the tip is contaminated.       * carboxymethylcellulose 1 % ophthalmic solution    CELLUVISC/REFRESH LIQUIGEL    90 each    Place 1 drop into both eyes every hour (while awake) Dispose of dropperette within 24 hours after opening or if the tip is contaminated.       cycloSPORINE 0.05 % ophthalmic emulsion    RESTASIS    3 Box    Place 1 drop into both eyes 2 times daily       fluorometholone 0.1 % ophthalmic susp    FML LIQUIFILM    15 mL    Place 1 drop into both eyes 4 times daily       SUMAtriptan 100 MG tablet    IMITREX     at onset of headache       TYLENOL PO      Take by mouth every 4 hours as needed       * Notice:  This list has 2 medication(s) that are the same as other medications prescribed for you. Read the directions carefully, and ask your doctor or other care provider to review them with you.

## 2017-05-08 NOTE — MR AVS SNAPSHOT
After Visit Summary   5/8/2017    Chloe Cleaning    MRN: [id number]           Patient Information     Date Of Birth          1995        Visit Information        Provider Department      5/8/2017 9:00 AM Anesthesiologist, [facility]        Today's Diagnoses     Eyelid retraction left lower eyelid    -  1    Eyelid retraction right lower eyelid           Follow-ups after your visit        Your next 10 appointments already scheduled     May 16, 2017 10:30 AM CDT   Post-Op with MD BRUNA Yeung [facility] ([facility])    [address] [place] [address]  4th [facility] [address] [phone]            Jun 08, 2017   Procedure with Jessi Chapman MD   [facility] ([facility])    [address] [place] [address] [phone]           Located in the Clinics and Surgery Center at [address] [place] [address].   parking is very convenient and highly recommended.  is a $6 flat rate fee.  Both  and self parkers should enter the main arrival plaza from [facility]; parking attendants will direct you based on your parking preference.            Jun 13, 2017 11:00 AM CDT   (Arrive by 10:45 AM)   Post-Op with MD BRUNA Yeung [facility] ([facility])    [address] [place] [address] [phone]              Who to contact     Please call your clinic at [phone] to:    Ask questions about your health    Make or cancel appointments    Discuss your medicines    Learn about your test results    Speak to your doctor   If you have compliments or concerns about an experience at your clinic, or if you wish to file a complaint, please contact [facility] Patient Relations at [phone] or email us at  [email or web address]         Additional Information About Your Visit        Kruxhart Information     Littlecastt is an electronic gateway that provides easy, online access to your medical records. With Ischemix, you can request a clinic appointment, read your test results, renew a prescription or communicate with your care team.     To sign up for Ischemix visit the website at [email or web address]   You will be asked to enter the access code listed below, as well as some personal information. Please follow the directions to create your username and password.     Your access code is: [email or web address]  Expires: 2017  8:30 AM     Your access code will  in 90 days. If you need help or a new code, please contact your [facility] or call [phone] for assistance.        Care EveryWhere ID     This is your Care EveryWhere ID. This could be used by other organizations to access your [place] medical records  [id number]        Your Vitals Were     Last Period                   2017 (Approximate)            Blood Pressure from Last 3 Encounters:   17 107/69   17 112/79   10/24/16 112/70    Weight from Last 3 Encounters:   17 68 kg (150 lb)   17 67 kg (147 lb 12.8 oz)   10/24/16 71.8 kg (158 lb 4.8 oz)              Today, you had the following     No orders found for display       Primary Care Provider    Physician No Ref-Primary       No address on file        Thank you!     Thank you for choosing [facility] PREOPERATIVE ASSESSMENT [place]  for your care. Our goal is always to provide you with excellent care. Hearing back from our patients is one way we can continue to improve our services. Please take a few minutes to complete the written survey that you may receive in the mail after your visit with us. Thank you!             Your Updated Medication List - Protect others around you: Learn how to safely use, store and throw away your medicines  at www.disposemymeds.org.          This list is accurate as of: 5/8/17 11:59 PM.  Always use your most recent med list.                   Brand Name Dispense Instructions for use    * carboxymethylcellulose 1 % ophthalmic solution    CELLUVISC/REFRESH LIQUIGEL    90 each    Place 1 drop into both eyes 4 times daily Dispose of dropperette within 24 hours after opening or if the tip is contaminated.       * carboxymethylcellulose 1 % ophthalmic solution    CELLUVISC/REFRESH LIQUIGEL    90 each    Place 1 drop into both eyes every hour (while awake) Dispose of dropperette within 24 hours after opening or if the tip is contaminated.       cycloSPORINE 0.05 % ophthalmic emulsion    RESTASIS    3 Box    Place 1 drop into both eyes 2 times daily       erythromycin ophthalmic ointment    ROMYCIN    3.5 g    Apply small amount to incision sites, as directed per physician instructions.       fluorometholone 0.1 % ophthalmic susp    FML LIQUIFILM    15 mL    Place 1 drop into both eyes 4 times daily       HYDROcodone-acetaminophen 5-325 MG per tablet    NORCO    10 tablet    Take 1 tablet by mouth every 6 hours as needed for pain Maximum of 4000 mg of acetaminophen in 24 hours.       SUMAtriptan 100 MG tablet    IMITREX     at onset of headache       TYLENOL PO      Take by mouth every 4 hours as needed       * Notice:  This list has 2 medication(s) that are the same as other medications prescribed for you. Read the directions carefully, and ask your doctor or other care provider to review them with you.

## 2017-05-09 NOTE — H&P
Pre-Operative H & P     CC:  Preoperative exam to assess for increased cardiopulmonary risk while undergoing surgery and anesthesia.    Date of Encounter: 5/8/2017  Primary Care Physician:  No Ref-Primary, Physician    LAWRENCE Corona BRUNA Cleaning is a 21 [facility] who presents for pre-operative H & P in preparation for Left Lower Eyelid Retraction Repair with Acellular Dermis Graft and Temporary Tarsorrhaphy with Dr. Chapman on 6/8/2017 at [facility] with combined MAC and local anesthesia as a same day surgery.    Ms. Cleaning was referred on 5/2/2017 to Dr. 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  Ms. Cleaning presents to PAC clinic with her mom and an .  She reports a history of migraines and constipation, but denies any cardiac or pulmonary history.  She denies chest pain, dyspnea, fever, chills, cough, sore throat, dysuria, hematuria and denies any change in bowel or bladder habits.  Ms. Cleaning would like to proceed with above procedure.  This is the first stage of a two stage procedure. She will return at a later date for right lower eyelid retraction repair in a similar fashion.    PAC referral for risk assessment and optimization of anesthesia with comorbid conditions of:  Limbal stem cell deficiency; corneal scars; migraines; neurological condition; & constipation.      History is obtained from the patient, electronic health record and patient's mother.     Past Medical History  Past Medical History:   Diagnosis Date     Conjunctival melanosis, bilateral      Dry eye      Keratitis      Limbal stem cell deficiency      Pannus (corneal), bilateral        Past Surgical History  No past surgical history on file.    Hx of Blood transfusions/reactions: denies     Hx of abnormal bleeding or anti-platelet use: denies    Menstrual history: Patient's last menstrual period was  04/20/2017 (approximate).    Steroid use in the last year: denies    Personal or FH with difficulty with Anesthesia: patient does not have prior history of anesthesia.  Denies family history with difficulty with anesthesia.    Prior to Admission Medications  Current Outpatient Prescriptions   Medication Sig Dispense Refill     cycloSPORINE (RESTASIS) 0.05 % ophthalmic emulsion Place 1 drop into both eyes 2 times daily 3 Box 3     carboxymethylcellulose (CELLUVISC/REFRESH LIQUIGEL) 1 % ophthalmic solution Place 1 drop into both eyes 4 times daily Dispose of dropperette within 24 hours after opening or if the tip is contaminated. 90 each 11     fluorometholone (FML LIQUIFILM) 0.1 % ophthalmic suspension Place 1 drop into both eyes 4 times daily 15 mL 11     Acetaminophen (TYLENOL PO) Take by mouth every 4 hours as needed        carboxymethylcellulose (CELLUVISC/REFRESH LIQUIGEL) 1 % ophthalmic solution Place 1 drop into both eyes every hour (while awake) Dispose of dropperette within 24 hours after opening or if the tip is contaminated. 90 each 4     SUMAtriptan (IMITREX) 100 MG tablet at onset of headache   3       Allergies  No Known Allergies    Social History  Social History     Social History     Marital status: Single     Spouse name: N/A     Number of children: N/A     Years of education: N/A     Occupational History     unemplyed      Social History Main Topics     Smoking status: Never Smoker     Smokeless tobacco: Never Used     Alcohol use No     Drug use: No     Sexual activity: No     Other Topics Concern     Not on file     Social History Narrative       Family History  Family History   Problem Relation Age of Onset     Glaucoma No family hx of      Macular Degeneration No family hx of      ROS/MED HX    ENT/Pulmonary:  - neg pulmonary ROS     Neurologic: Comment: Patient was evaluated at the [facility] for Migraines when she first came to [place] two years ago.  MRI report from [place] mentions numerous  "white matter lesions indicative of ADEM.  Tylenol works for her migraines.      (+)migraines,    (-) Multiple Sclerosis (patient denies)   Cardiovascular:  - neg cardiovascular ROS   (+) ----. : . . . :. . No previous cardiac testing       METS/Exercise Tolerance:  >4 METS   Hematologic: Comments: Hgb 11.7 7/4/2016        Musculoskeletal:   (+) , fracture lower extremity: Ankle, -       GI/Hepatic:     (+) Other GI/Hepatic (h/o constipation)       Renal/Genitourinary:  - ROS Renal section negative       Endo:  - neg endo ROS       Psychiatric:  - neg psychiatric ROS       Infectious Disease:  - neg infectious disease ROS       Malignancy:      - no malignancy   Other:    (+) C-spine cleared: N/A, no H/O Chronic Pain,no other significant disability      Temp: 98.1  F (36.7  C) Temp src: Oral BP: 112/79 Pulse: 80   Resp: 20 SpO2: 100 %         147 lbs 12.8 oz  5' 1\"   Body mass index is 27.93 kg/(m^2).       Physical Exam  Constitutional: Awake, alert, cooperative, no apparent distress, and appears stated age.  Eyes: Pupils equal, round and reactive to light, extra ocular muscles intact, sclera clear, conjunctiva normal.  Bilateral upper lid lag.  HENT: Normocephalic, oral pharynx with moist mucus membranes, good dentition. No goiter appreciated.   Respiratory: Clear to auscultation bilaterally, no crackles or wheezing.  Cardiovascular: Regular rate and rhythm, normal S1 and S2, and no murmur noted.  Carotids +2, no bruits. No edema. Palpable pulses to radial  DP and PT arteries.   GI: Normal bowel sounds, soft, non-distended, non-tender, no masses palpated, no hepatosplenomegaly.    Lymph/Hematologic: No cervical lymphadenopathy and no supraclavicular lymphadenopathy.  Genitourinary:  na  Skin: Warm and dry.  No rashes at anticipated surgical site.   Musculoskeletal: Full ROM of neck. There is no redness, warmth, or swelling of the joints. Gross motor strength is normal.    Neurologic: Awake, alert, oriented to name, " place and time. Cranial nerves II-XII are grossly intact. Gait is normal.   Neuropsychiatric: Calm, cooperative. Normal affect.     Labs: (personally reviewed)  Lab Results   Component Value Date    WBC 6.8 07/14/2016     Lab Results   Component Value Date    RBC 4.55 07/14/2016     Lab Results   Component Value Date    HGB 11.7 07/14/2016     Lab Results   Component Value Date    HCT 35.3 07/14/2016     Lab Results   Component Value Date    MCV 78 07/14/2016     Lab Results   Component Value Date    MCH 25.7 07/14/2016     Lab Results   Component Value Date    MCHC 33.1 07/14/2016     Lab Results   Component Value Date    RDW 16.2 07/14/2016     Lab Results   Component Value Date     07/14/2016       Last Basic Metabolic Panel:  Lab Results   Component Value Date     07/14/2016      Lab Results   Component Value Date    POTASSIUM 3.5 07/14/2016     Lab Results   Component Value Date    CHLORIDE 105 07/14/2016     Lab Results   Component Value Date    MARGARITA 9.2 07/14/2016     Lab Results   Component Value Date    CO2 22 07/14/2016     Lab Results   Component Value Date    BUN 15 07/14/2016     Lab Results   Component Value Date    CR 0.70 07/14/2016     Lab Results   Component Value Date    GLC 84 07/14/2016       EKG:  does not meet ACC/AHA criteria.  Outside records reviewed from: [place]    ASSESSMENT and PLAN  Chloe Cleaning is a 21 year old female scheduled to undergo  Left Lower Eyelid Retraction Repair with Acellular Dermis Graft and Temporary Tarsorrhaphy with Dr. Chapman on 6/8/2017 at [facility] with combined MAC and local anesthesia as a same day surgery.        Cardiology - RCRI : No serious cardiac risks.  0.4 % risk of major adverse cardiac event. METS >4.  Denies any cardiac history or symptoms.  Pulmonary - no smoking.  No pulmonary history or symptoms.  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  Surgery planned as in [facility].    She has the following specific operative considerations:   - Anesthesia considerations:  Refer to PAC assessment in anesthesia records  - VTE risk:  0.26%  - VA # of risks 0/8 = low risk  - Risk of PONV score = 2.  If > 2, anti-emetic intervention recommended.    Arrival time, NPO, shower and medication instructions provided by nursing staff today.  Preparing For Your Surgery handout given.  Patient was discussed with Dr Humphrey. I spent 20 minutes face to face with patient, assessing, examining, and educating.    INA Mayers CNP  Preoperative Assessment Center  [facility]  Phone: [phone]  Fax: [phone]

## 2017-05-11 ENCOUNTER — ANESTHESIA (OUTPATIENT)
Dept: SURGERY | Facility: AMBULATORY SURGERY CENTER | Age: 22
End: 2017-05-11

## 2017-05-11 ENCOUNTER — SURGERY (OUTPATIENT)
Age: 22
End: 2017-05-11

## 2017-05-11 ENCOUNTER — HOSPITAL ENCOUNTER (OUTPATIENT)
Facility: AMBULATORY SURGERY CENTER | Age: 22
End: 2017-05-11
Attending: OPHTHALMOLOGY

## 2017-05-11 VITALS
SYSTOLIC BLOOD PRESSURE: 107 MMHG | BODY MASS INDEX: 28.32 KG/M2 | WEIGHT: 150 LBS | DIASTOLIC BLOOD PRESSURE: 69 MMHG | OXYGEN SATURATION: 100 % | RESPIRATION RATE: 14 BRPM | TEMPERATURE: 96.8 F | HEIGHT: 61 IN | HEART RATE: 72 BPM

## 2017-05-11 DIAGNOSIS — Z98.890 POSTOPERATIVE EYE STATE: Primary | ICD-10-CM

## 2017-05-11 LAB
HCG UR QL: NEGATIVE
INTERNAL QC OK POCT: YES

## 2017-05-11 RX ORDER — LIDOCAINE HYDROCHLORIDE 20 MG/ML
INJECTION, SOLUTION INFILTRATION; PERINEURAL PRN
Status: DISCONTINUED | OUTPATIENT
Start: 2017-05-11 | End: 2017-05-11

## 2017-05-11 RX ORDER — NALOXONE HYDROCHLORIDE 0.4 MG/ML
.1-.4 INJECTION, SOLUTION INTRAMUSCULAR; INTRAVENOUS; SUBCUTANEOUS
Status: DISCONTINUED | OUTPATIENT
Start: 2017-05-11 | End: 2017-05-12 | Stop reason: HOSPADM

## 2017-05-11 RX ORDER — TETRACAINE HYDROCHLORIDE 5 MG/ML
SOLUTION OPHTHALMIC PRN
Status: DISCONTINUED | OUTPATIENT
Start: 2017-05-11 | End: 2017-05-11 | Stop reason: HOSPADM

## 2017-05-11 RX ORDER — PROPOFOL 10 MG/ML
INJECTION, EMULSION INTRAVENOUS PRN
Status: DISCONTINUED | OUTPATIENT
Start: 2017-05-11 | End: 2017-05-11

## 2017-05-11 RX ORDER — FENTANYL CITRATE 50 UG/ML
25-50 INJECTION, SOLUTION INTRAMUSCULAR; INTRAVENOUS EVERY 5 MIN PRN
Status: DISCONTINUED | OUTPATIENT
Start: 2017-05-11 | End: 2017-05-12 | Stop reason: HOSPADM

## 2017-05-11 RX ORDER — FENTANYL CITRATE 50 UG/ML
INJECTION, SOLUTION INTRAMUSCULAR; INTRAVENOUS PRN
Status: DISCONTINUED | OUTPATIENT
Start: 2017-05-11 | End: 2017-05-11

## 2017-05-11 RX ORDER — SODIUM CHLORIDE, SODIUM LACTATE, POTASSIUM CHLORIDE, CALCIUM CHLORIDE 600; 310; 30; 20 MG/100ML; MG/100ML; MG/100ML; MG/100ML
INJECTION, SOLUTION INTRAVENOUS CONTINUOUS
Status: DISCONTINUED | OUTPATIENT
Start: 2017-05-11 | End: 2017-05-12 | Stop reason: HOSPADM

## 2017-05-11 RX ORDER — ACETAMINOPHEN 325 MG/1
975 TABLET ORAL ONCE
Status: COMPLETED | OUTPATIENT
Start: 2017-05-11 | End: 2017-05-11

## 2017-05-11 RX ORDER — HYDROCODONE BITARTRATE AND ACETAMINOPHEN 5; 325 MG/1; MG/1
1 TABLET ORAL
Status: DISCONTINUED | OUTPATIENT
Start: 2017-05-11 | End: 2017-05-12 | Stop reason: HOSPADM

## 2017-05-11 RX ORDER — ONDANSETRON 4 MG/1
4 TABLET, ORALLY DISINTEGRATING ORAL EVERY 30 MIN PRN
Status: DISCONTINUED | OUTPATIENT
Start: 2017-05-11 | End: 2017-05-12 | Stop reason: HOSPADM

## 2017-05-11 RX ORDER — SODIUM CHLORIDE, SODIUM LACTATE, POTASSIUM CHLORIDE, CALCIUM CHLORIDE 600; 310; 30; 20 MG/100ML; MG/100ML; MG/100ML; MG/100ML
INJECTION, SOLUTION INTRAVENOUS CONTINUOUS
Status: DISCONTINUED | OUTPATIENT
Start: 2017-05-11 | End: 2017-05-11 | Stop reason: HOSPADM

## 2017-05-11 RX ORDER — GENTAMICIN 40 MG/ML
INJECTION, SOLUTION INTRAMUSCULAR; INTRAVENOUS CONTINUOUS PRN
Status: DISCONTINUED | OUTPATIENT
Start: 2017-05-11 | End: 2017-05-11 | Stop reason: HOSPADM

## 2017-05-11 RX ORDER — ONDANSETRON 2 MG/ML
4 INJECTION INTRAMUSCULAR; INTRAVENOUS EVERY 30 MIN PRN
Status: DISCONTINUED | OUTPATIENT
Start: 2017-05-11 | End: 2017-05-12 | Stop reason: HOSPADM

## 2017-05-11 RX ORDER — HYDROCODONE BITARTRATE AND ACETAMINOPHEN 5; 325 MG/1; MG/1
1 TABLET ORAL EVERY 6 HOURS PRN
Qty: 10 TABLET | Refills: 0 | Status: SHIPPED | OUTPATIENT
Start: 2017-05-11 | End: 2017-05-16

## 2017-05-11 RX ORDER — MEPERIDINE HYDROCHLORIDE 25 MG/ML
12.5 INJECTION INTRAMUSCULAR; INTRAVENOUS; SUBCUTANEOUS
Status: DISCONTINUED | OUTPATIENT
Start: 2017-05-11 | End: 2017-05-12 | Stop reason: HOSPADM

## 2017-05-11 RX ORDER — GABAPENTIN 300 MG/1
300 CAPSULE ORAL ONCE
Status: COMPLETED | OUTPATIENT
Start: 2017-05-11 | End: 2017-05-11

## 2017-05-11 RX ORDER — ERYTHROMYCIN 5 MG/G
OINTMENT OPHTHALMIC
Qty: 3.5 G | Refills: 0 | Status: SHIPPED | OUTPATIENT
Start: 2017-05-11 | End: 2017-05-16

## 2017-05-11 RX ADMIN — GENTAMICIN 80 MG: 40 INJECTION, SOLUTION INTRAMUSCULAR; INTRAVENOUS at 12:16

## 2017-05-11 RX ADMIN — PROPOFOL 20 MG: 10 INJECTION, EMULSION INTRAVENOUS at 12:01

## 2017-05-11 RX ADMIN — PROPOFOL 20 MG: 10 INJECTION, EMULSION INTRAVENOUS at 12:02

## 2017-05-11 RX ADMIN — TETRACAINE HYDROCHLORIDE 2 DROP: 5 SOLUTION OPHTHALMIC at 12:15

## 2017-05-11 RX ADMIN — PROPOFOL 20 MG: 10 INJECTION, EMULSION INTRAVENOUS at 12:03

## 2017-05-11 RX ADMIN — PROPOFOL 20 MG: 10 INJECTION, EMULSION INTRAVENOUS at 11:59

## 2017-05-11 RX ADMIN — PROPOFOL 20 MG: 10 INJECTION, EMULSION INTRAVENOUS at 12:24

## 2017-05-11 RX ADMIN — PROPOFOL 20 MG: 10 INJECTION, EMULSION INTRAVENOUS at 12:10

## 2017-05-11 RX ADMIN — SODIUM CHLORIDE, SODIUM LACTATE, POTASSIUM CHLORIDE, CALCIUM CHLORIDE: 600; 310; 30; 20 INJECTION, SOLUTION INTRAVENOUS at 11:10

## 2017-05-11 RX ADMIN — GABAPENTIN 300 MG: 300 CAPSULE ORAL at 11:10

## 2017-05-11 RX ADMIN — PROPOFOL 20 MG: 10 INJECTION, EMULSION INTRAVENOUS at 12:06

## 2017-05-11 RX ADMIN — LIDOCAINE HYDROCHLORIDE 60 MG: 20 INJECTION, SOLUTION INFILTRATION; PERINEURAL at 11:59

## 2017-05-11 RX ADMIN — ACETAMINOPHEN 975 MG: 325 TABLET ORAL at 11:10

## 2017-05-11 RX ADMIN — FENTANYL CITRATE 50 MCG: 50 INJECTION, SOLUTION INTRAMUSCULAR; INTRAVENOUS at 11:54

## 2017-05-11 RX ADMIN — PROPOFOL 20 MG: 10 INJECTION, EMULSION INTRAVENOUS at 12:14

## 2017-05-11 RX ADMIN — PROPOFOL 20 MG: 10 INJECTION, EMULSION INTRAVENOUS at 12:22

## 2017-05-11 NOTE — IP AVS SNAPSHOT
MRN:[id number]                      After Visit Summary   5/11/2017    Chloe Cleaning    MRN: [id number]           Thank you!     Thank you for choosing [facility] for your care. Our goal is always to provide you with excellent care. Hearing back from our patients is one way we can continue to improve our services. Please take a few minutes to complete the written survey that you may receive in the mail after you visit with us. Thank you!        Patient Information     Date Of Birth          1995        About your hospital stay     You were admitted on:  May 11, 2017 You last received care in the:  [facility]    You were discharged on:  May 11, 2017       Who to Call     For medical emergencies, please call 911.  For non-urgent questions about your medical care, please call your primary care provider or clinic, None  For questions related to your surgery, please call your surgery clinic        Attending Provider     Provider Specialty    Jessi Chapman MD Ophthalmology       Primary Care Provider    Physician No Ref-Primary       No address on file        After Care Instructions     Discharge Medication Instructions       Do NOT take aspirin or medications containing NSAIDS for 72 hours after procedure.            Ice to affected area       Apply cold pack for 15 minutes on, 15 minutes off, for 48 hours while awake.                  Your next 10 appointments already scheduled     May 16, 2017 10:45 AM CDT   (Arrive by 10:30 AM)   Post-Op with Jessi Chapman MD   [facility] ([facility])    [address] [place] [address]  4th [facility] [address] [phone]            Jun 08, 2017   Procedure with Jessi Chapman MD   [facility] ([facility])    [address] [place] [address]  5th [facility] [address] [phone]           Located in the Clinics and Surgery Center at  [address] [place] [address].   parking is very convenient and highly recommended.  is a $6 flat rate fee.  Both  and self parkers should enter the main arrival plaza from [facility]; parking attendants will direct you based on your parking preference.            Jun 13, 2017 11:00 AM CDT   (Arrive by 10:45 AM)   Post-Op with Jessi Chapman MD   [facility] ([facility])    [address] [place] [address]  4th Floor  [facility] [address] [phone]              Further instructions from your care team       Post-operative Instructions  Ophthalmic Plastic and Reconstructive Surgery    Jessi Chapman M.D.     All instructions apply to the operated eye(s) or eyelid(s).    Wound care and personal care  ? If a patch or bandage has been placed, please leave this in place until seen by your physician. Ensure that the bandage does not get wet when you take a shower.  ? Apply ice compresses 15 minutes on 15 minutes off while awake for 2 days, then switch to warm water compresses 4 times a day until seen by your physician. For warm packs you can place a cup of dry uncooked rice in a clean cotton sock. Then place sock in microwave 30 seconds to one minute. Next place the warm sock into a plastic bag and wrap the bag with clean warm wet washcloth and place over operated eye.    ? You may shower or wash your hair the day after surgery. Do not bathe or go swimming for 1 week to prevent contamination of your wounds.  ? Do not apply make-up to the eyes or eyelids for 2 weeks after surgery.  ? Expect some swelling, bruising, black eye (even into the lower eyelids and cheeks). Also expect serum caking, crusting and discharge from the eye and/or incisions. A small amount of surface bleeding is normal for the first 48 hours.  ? Your eye(s) and eyelid(s) may be painful and tender. This is normal after surgery.  Use the pain medication as prescribed. If your pain does  not improve despite the  medication, contact the office.    Contact information and follow-up  ? Return to the Eye Clinic for a follow-up appointment with your physician as  scheduled. If no appointment has been scheduled:   - [facility] eye clinic: [phone] for an appointment with Dr. Chapman within 1 to 2 weeks from your date of surgery.   -  [facility] eye clinic: [phone] for an appointment with Dr. Chapman within 1 to 2 weeks from your date of surgery.     ? For severe pain, bleeding, or loss of vision, call the [facility] at [phone] or [facility] at [phone].     After hours or on weekends and holidays, call [phone] and ask to speak with the ophthalmologist on call.    An on call person can be reached after hours for concerns. The on call doctor should not call in medication refill requests after hours or on weekends, so please plan accordingly. An effort has been made to provide adequate pain medications following every surgery, and refills will not be provided in most instances. Narcotic pain medications cannot be called in.     Activity restrictions and driving  ? Avoid heavy lifting, bending, exercise or strenuous activity for 1 week after surgery.  You may resume other activities and return to work as tolerated.  ? You may not resume driving until have you stopped using narcotic pain medications (such as Norco, Percocet, Tylenol #3).    Medications  ? Restart all your regular home medications and eye drops. If you take Plavix or  Aspirin on a regular basis, wait for 72 hours after your surgery before restarting these in order to decrease the risk of bleeding complications.  ? Avoid aspirin and aspirin-like medications (Motrin, Aleve, Ibuprofen, Marlin-  [place] etc) for 72 hours to reduce the risk of bleeding. You may take Tylenol  (acetaminophen) for pain.  ? In addition to your home medications, take the  following post-operative medications as prescribed by your physician.    ? Apply antibiotic ointment to all sutures three times a day.  ? Take pain pills at prescribed frequency as needed for pain.    ? WARNING: All the prescription pain medications listed above contain Tylenol  (acetaminophen). You must not take more than 4,000 mg of acetaminophen per  24-hour period. This is equivalent to 6 tablets of Darvocet, 12 tablets of Norco, Percocet or Tylenol #3. If you take other over-the-counter medications containing acetaminophen, you must take the amount of acetaminophen into account and reduce the number of prescribed pain pills accordingly.  ? The prescribed medications may make you drowsy. You must not drive a car,  operate heavy machinery or drink alcohol while taking them.  ? The prescribed pain medications may cause constipation and nausea. Take them  with some food to prevent a stomach upset. If you continue to experience nausea,  call your physicians  [facility]  Home Care Following Anesthesia  For 24 hours after surgery:  1. Get plenty of rest.  A responsible adult must stay with you for at least 24 hours after you leave the surgery center.  2. Do not drive or use heavy equipment.  If you have weakness or tingling, don't drive or use heavy equipment until this feeling goes away.   3. Do not drink alcohol.   4. Avoid strenuous or risky activities.  Ask for help when climbing stairs.  5. You may feel lightheaded.  IF so, sit for a few minutes before standing.  Have someone help you get up.   6. If you have nausea (feel sick to your stomach): Drink only clear liquids such as apple juice, ginger ale, broth or 7-Up.  Rest may also help.  Be sure to drink enough fluids.  Move to a regular diet as you feel able.   7. You may have a slight fever.  Call the doctor if your fever is over 100 F (37.7 C) (taken under the tongue) or lasts longer than 24 hours.  8. You may have a dry  "mouth, a sore throat, muscle aches or trouble sleeping. These should go away after 24 hours.  9. Do not make important or legal decisions.   {[employer] Single    g pain medications  To get the best pain relief possible, remember these points:    Take pain medications as directed, before pain becomes severe.    Pain medication can upset your stomach: taking it with food may help.    Constipation is a common side effect of pain medication. Drink plenty of  fluids.    Eat foods high in fiber. Take a stool softener if recommended by your doctor or pharmacist.    Do not drink alcohol, drive or operate machinery while taking pain medications.    Ask about other ways to control pain, such as with heat, ice or relaxation.    Call a doctor for any of the followin. Signs of infection (fever, growing tenderness at the surgery site, a large amount of drainage or bleeding, severe pain, foul-smelling drainage, redness, swelling).  2. It has been over 8 to 10 hours since surgery and you are still not able to urinate (pass water).  3. Headache for over 24 hours.  4. Numbness, tingling or weakness the day after surgery (if you had spinal anesthesia).  Your doctor is:  {Malini Chapman, Ophthalmology: [phone] [phone] and ask for the resident on call for:  {BlankBase Single Ophthalmologyre, call the:  {BlankBase Single [facility]:  [phone] (TTY for hearing impaired: [phone])    Pending Results     No orders found from 2017 to 2017.            Admission Information     Date & Time Provider Department Dept. Phone    2017 Jessi Chapman MD [facility] [phone]      Your Vitals Were     Blood Pressure Pulse Temperature Respirations Height Weight    119/81 72 98.7  F (37.1  C) (Oral) 16 1.549 m (5' 0.98\") 68 kg (150 lb)    Last Period Pulse Oximetry BMI (Body Mass Index)             2017 (Approximate) 100% 28.36 " kg/m2         [employer] Information     [employer] is an electronic gateway that provides easy, online access to your medical records. With [employer], you can request a clinic appointment, read your test results, renew a prescription or communicate with your care team.     To sign up for [employer] visit the website at [email or web address]   You will be asked to enter the access code listed below, as well as some personal information. Please follow the directions to create your username and password.     Your access code is: [email or web address]  Expires: 2017  8:30 AM     Your access code will  in 90 days. If you need help or a new code, please contact your [facility] or call [phone] for assistance.        Care EveryWhere ID     This is your Care EveryWhere ID. This could be used by other organizations to access your [place] medical records  [id number]           Review of your medicines      START taking        Dose / Directions    erythromycin ophthalmic ointment   Commonly known as:  ROMYCIN   Used for:  Postoperative eye state        Apply small amount to incision sites, as directed per physician instructions.   Quantity:  3.5 g   Refills:  0       HYDROcodone-acetaminophen 5-325 MG per tablet   Commonly known as:  NORCO   Used for:  Postoperative eye state        Dose:  1 tablet   Take 1 tablet by mouth every 6 hours as needed for pain Maximum of 4000 mg of acetaminophen in 24 hours.   Quantity:  10 tablet   Refills:  0         CONTINUE these medicines which have NOT CHANGED        Dose / Directions    * carboxymethylcellulose 1 % ophthalmic solution   Commonly known as:  CELLUVISC/REFRESH LIQUIGEL   Used for:  Dry eyes, bilateral, Limbal stem cell deficiency, bilateral, Pannus (corneal), bilateral        Dose:  1 drop   Place 1 drop into both eyes 4 times daily Dispose of dropperette within 24 hours after opening or if the tip is contaminated.   Quantity:  90 each    Refills:  11       * carboxymethylcellulose 1 % ophthalmic solution   Commonly known as:  CELLUVISC/REFRESH LIQUIGEL   Used for:  Limbal stem cell deficiency, bilateral, Dry eyes, bilateral        Dose:  1 drop   Place 1 drop into both eyes every hour (while awake) Dispose of dropperette within 24 hours after opening or if the tip is contaminated.   Quantity:  90 each   Refills:  4       cycloSPORINE 0.05 % ophthalmic emulsion   Commonly known as:  RESTASIS   Used for:  Dry eyes, bilateral, Limbal stem cell deficiency, bilateral        Dose:  1 drop   Place 1 drop into both eyes 2 times daily   Quantity:  3 Box   Refills:  3       fluorometholone 0.1 % ophthalmic susp   Commonly known as:  FML LIQUIFILM   Used for:  Keratitis        Dose:  1 drop   Place 1 drop into both eyes 4 times daily   Quantity:  15 mL   Refills:  11       SUMAtriptan 100 MG tablet   Commonly known as:  IMITREX   Used for:  Throat pain        at onset of headache   Refills:  3       TYLENOL PO        Take by mouth every 4 hours as needed   Refills:  0       * Notice:  This list has 2 medication(s) that are the same as other medications prescribed for you. Read the directions carefully, and ask your doctor or other care provider to review them with you.         Where to get your medicines      These medications were sent to [address] [address]    Hours:  TRANSPLANT PHONE NUMBER [phone] Phone:  [phone]     erythromycin ophthalmic ointment         Some of these will need a paper prescription and others can be bought over the counter. Ask your nurse if you have questions.     Bring a paper prescription for each of these medications     HYDROcodone-acetaminophen 5-325 MG per tablet                Protect others around you: Learn how to safely use, store and throw away your medicines at www.disposemymeds.org.              Medication List: This is a list of all your medications and when to take them. Check marks below indicate your daily home schedule. Keep this list as a reference.      Medications           Morning Afternoon Evening Bedtime As Needed    * carboxymethylcellulose 1 % ophthalmic solution   Commonly known as:  CELLUVISC/REFRESH LIQUIGEL   Place 1 drop into both eyes 4 times daily Dispose of dropperette within 24 hours after opening or if the tip is contaminated.                                * carboxymethylcellulose 1 % ophthalmic solution   Commonly known as:  CELLUVISC/REFRESH LIQUIGEL   Place 1 drop into both eyes every hour (while awake) Dispose of dropperette within 24 hours after opening or if the tip is contaminated.                                cycloSPORINE 0.05 % ophthalmic emulsion   Commonly known as:  RESTASIS   Place 1 drop into both eyes 2 times daily                                erythromycin ophthalmic ointment   Commonly known as:  ROMYCIN   Apply small amount to incision sites, as directed per physician instructions.                                fluorometholone 0.1 % ophthalmic susp   Commonly known as:  FML LIQUIFILM   Place 1 drop into both eyes 4 times daily                                HYDROcodone-acetaminophen 5-325 MG per tablet   Commonly known as:  NORCO   Take 1 tablet by mouth every 6 hours as needed for pain Maximum of 4000 mg of acetaminophen in 24 hours.                                SUMAtriptan 100 MG tablet   Commonly known as:  IMITREX   at onset of headache                                TYLENOL PO   Take by mouth every 4 hours as needed   Last time this was given:  975 mg on 5/11/2017 11:10 AM                                * Notice:  This list has 2 medication(s) that are the same as other medications prescribed for you. Read the directions carefully, and ask your doctor or other care provider to review them with you.

## 2017-05-11 NOTE — OP NOTE
PREOPERATIVE DIAGNOSIS: Left lower eyelid retraction.   POSTOPERATIVE DIAGNOSIS: Left  lower eyelid retraction.   PROCEDURE PERFORMED: Left lower eyelid retraction repair with conjunctivoplasty, Alloderm (acellular cross-linked porcine dermal collagen) graft, lateral canthopexy and temporary Blunt suture tarsorrhaphy.   SURGEON: Jessi Chapman MD.   ASSISTANTS: Gibson Harkins MD  ANESTHESIA: Monitored with local infiltration of a 50/50 mixture of 2% lidocaine with epinephrine and 0.5% Marcaine.   COMPLICATIONS: None.   ESTIMATED BLOOD LOSS: Less than 5 mL.   HISTORY OF PRESENT ILLNESS: Chloe Cleaning  presented with left lower lid retraction leading to exposure keratitis. After the risks, benefits and alternatives to the proposed procedure were explained, informed consent was obtained.   DESCRIPTION OF PROCEDURE: Chloe Cleaning was brought to the operating room and placed supine on the operating table.  The left  lower lid lateral canthus, upper lid and brow were infiltrated with local anesthetic. Anesthesia was infiltrated. The area prepped and draped in the typical sterile ophthalmic fashion. Attention was directed to the left side. Lateral canthal incision was made with a 15-blade and dissection carried down through the orbicularis with monopolar cautery. Lateral canthotomy and inferior cantholysis was performed. Transconjunctival incision was performed with cautery and Jonathan scissors at the inferior tarsal border releasing the lower eyelid retractors and conjunctiva. The Alloderm acellular dermal collagen graft was soaked in gentamicin solution, trimmed to fit in the lower lid conjunctival defect and sutured in place with running 6-0 plain gut sutures, securing the graft to the inferior tarsal border superiorly and inferiorly to the inferior conjunctival edge. The lateral tarsus was reattached to the lateral orbital rim periosteum with a double-armed 5-0 vicryl suture in a horizontal mattress fashion lateral  canthopexy. Lateral canthus was reconstructed with a 5-0 Vicryl suture. Skin was closed with interrupted 6-0 plain gut sutures. A 5-0 Prolene suture was placed in a Frost suture technique through the lower lid, upper lid and brow and tied over a Telfa bolster. Chloe Cleaning  tolerated the procedure well and was taken to recovery room in stable condition.     Jessi Chapman MD

## 2017-05-11 NOTE — LETTER
To whom it may concern;    Chloe Cleaning is under my care at the [facility]. She underwent surgery 5/11/2017. Please excuse her brother from work, as he helped provide transportation and care for Chloe today.    Sincerely,    Jessi Chapman MD    Oculoplastic and Orbital Surgery   Department of Ophthalmology and Visual Neurosciences  [facility]

## 2017-05-11 NOTE — ANESTHESIA CARE TRANSFER NOTE
Patient: Chloe Cleaning    Procedure(s):  Left Lower Eyelid Retraction Repair with Acellular Dermis Graft and Temporary Tarsorrhaphy, Frost Suture - Wound Class: I-Clean   - Wound Class: I-Clean    Diagnosis: Eyelid Retraction  Diagnosis Additional Information: No value filed.    Anesthesia Type:   General, LMA     Note:  Airway :Room Air  Patient transferred to:Phase II  Comments: Arrive Phase II, Stable, Airway Intact  101/63, 76,16,100,36.8  All questions answered.      Vitals: (Last set prior to Anesthesia Care Transfer)    CRNA VITALS  5/11/2017 1206 - 5/11/2017 1240      5/11/2017             Ht Rate: 82    SpO2: 100 %    Resp Rate (set): 10                Electronically Signed By: INA Ho CRNA  May 11, 2017  12:40 PM

## 2017-05-11 NOTE — ANESTHESIA POSTPROCEDURE EVALUATION
Patient: Chloe Cleaning    Procedure(s):  Left Lower Eyelid Retraction Repair with Acellular Dermis Graft and Temporary Tarsorrhaphy, Frost Suture - Wound Class: I-Clean   - Wound Class: I-Clean    Diagnosis:Eyelid Retraction  Diagnosis Additional Information: No value filed.    Anesthesia Type:  General, LMA    Note:  Anesthesia Post Evaluation    Patient location during evaluation: Phase 2  Patient participation: Able to fully participate in evaluation  Level of consciousness: awake and alert  Pain management: adequate  Airway patency: patent  Cardiovascular status: acceptable  Respiratory status: acceptable  Hydration status: acceptable  PONV: none     Anesthetic complications: None          Last vitals:  Vitals:    05/11/17 1240 05/11/17 1255 05/11/17 1310   BP: 101/63 106/69 107/69   Pulse:      Resp: 14 14 14   Temp: 36  C (96.8  F)  36  C (96.8  F)   SpO2: 99% 100% 100%         Electronically Signed By: Adonis Echols DO  May 11, 2017  1:35 PM

## 2017-05-11 NOTE — IP AVS SNAPSHOT
[facility]    [address] [place] [address]    Phone:  [phone]    Fax:  [phone]                                       After Visit Summary   5/11/2017    Chloe Cleaning    MRN: [id number]           After Visit Summary Signature Page     I have received my discharge instructions, and my questions have been answered. I have discussed any challenges I see with this plan with the nurse or doctor.    ..........................................................................................................................................  Patient/Patient Representative Signature      ..........................................................................................................................................  Patient Representative Print Name and Relationship to Patient    ..................................................               ................................................  Date                                            Time    ..........................................................................................................................................  Reviewed by Signature/Title    ...................................................              ..............................................  Date                                                            Time

## 2017-05-16 ENCOUNTER — OFFICE VISIT (OUTPATIENT)
Dept: OPHTHALMOLOGY | Facility: CLINIC | Age: 22
End: 2017-05-16

## 2017-05-16 DIAGNOSIS — Z98.890 POSTOPERATIVE EYE STATE: Primary | ICD-10-CM

## 2017-05-16 RX ORDER — ERYTHROMYCIN 5 MG/G
OINTMENT OPHTHALMIC
Qty: 3.5 G | Refills: 0 | Status: SHIPPED | OUTPATIENT
Start: 2017-05-16 | End: 2018-02-21

## 2017-05-16 ASSESSMENT — TONOMETRY
OD_IOP_MMHG: 12
OS_IOP_MMHG: TARS
IOP_METHOD: ICARE

## 2017-05-16 ASSESSMENT — VISUAL ACUITY
OD_SC: 20/60
METHOD: SNELLEN - LINEAR
OS_SC: TARSO
OD_SC+: -1

## 2017-05-16 NOTE — NURSING NOTE
Chief Complaints and History of Present Illnesses   Patient presents with     Post Op (Ophthalmology) Left Eye     POD#5 s/p LLL retraction repair with conjunctivoplasty Alloderm (acellular cross-linked porcine dermall collagen) graft, lateral canthopexy and temporary Blunt suture tarsorrhaphy     HPI    Affected eye(s):  Left   Symptoms:     No blurred vision   Tearing   No itching   Burning   No eye discharge         Do you have eye pain now?:  Yes   Location:  OS   Pain Level:  Moderate Pain (5)   Pain Frequency:  Constant   Other:  patient notes pain med has been gone since 2 days ago and has had constant pain since than, was better with pain med   Pain Characteristics:  Burning   Other:  worse in down gaze per pt mother      Comments:  Pt d/c ran and pain med    Tasneem Garcia May 16, 2017 10:51 AM

## 2017-05-16 NOTE — MR AVS SNAPSHOT
After Visit Summary   5/16/2017    Chloe Cleaning    MRN: [id number]           Patient Information     Date Of Birth          1995        Visit Information        Provider Department      5/16/2017 10:30 AM Jessi Chapman MD; [employer] [facility]        Today's Diagnoses     Postoperative eye state    -  1       Follow-ups after your visit        Your next 10 appointments already scheduled     Jun 08, 2017   Procedure with Jessi Chapman MD   [facility] ([facility] [place])    [address] [place] [address]  5th [place] [id number] [phone]           Located in the Clinics and Surgery Center at [address] [place] [address].   parking is very convenient and highly recommended.  is a $6 flat rate fee.  Both  and self parkers should enter the main arrival plaza from [facility]; parking attendants will direct you based on your parking preference.            Jun 13, 2017 11:00 AM CDT   (Arrive by 10:45 AM)   Post-Op with Jessi Chapman MD   [facility] ([facility])    [address] [place] [address]  4th [facility] [address] [phone]              Who to contact     Please call your clinic at [phone] to:    Ask questions about your health    Make or cancel appointments    Discuss your medicines    Learn about your test results    Speak to your doctor   If you have compliments or concerns about an experience at your clinic, or if you wish to file a complaint, please contact [facility] Patient Relations at [phone] or email us at [email or web address]         Additional Information About Your Visit        @[email or web address] Information     ClickFox is an electronic gateway that provides easy, online access to your medical records. With ClickFox, you can request a clinic appointment, read your test results, renew a  "prescription or communicate with your care team.     To sign up for Fanarchy Limitedt visit the website at [email or web address]   You will be asked to enter the access code listed below, as well as some personal information. Please follow the directions to create your username and password.     Your access code is: [email or web address]  Expires: 2017  8:30 AM     Your access code will  in 90 days. If you need help or a new code, please contact your [facility] or call [phone] for assistance.        Care EveryWhere ID     This is your Care EveryWhere ID. This could be used by other organizations to access your [place] medical records  [id number]        Your Vitals Were     Last Period                   2017 (Approximate)            Blood Pressure from Last 3 Encounters:   17 107/69   17 112/79   10/24/16 112/70    Weight from Last 3 Encounters:   17 68 kg (150 lb)   17 67 kg (147 lb 12.8 oz)   10/24/16 71.8 kg (158 lb 4.8 oz)              Today, you had the following     No orders found for display         Today's Medication Changes          These changes are accurate as of: 17 12:04 PM.  If you have any questions, ask your nurse or doctor.               These medicines have changed or have updated prescriptions.        Dose/Directions    erythromycin ophthalmic ointment   Commonly known as:  ROMYCIN   This may have changed:  additional instructions   Used for:  Postoperative eye state   Changed by:  Jessi Chapman MD        Apply \" strip to left eye three times a day   Quantity:  3.5 g   Refills:  0         Stop taking these medicines if you haven't already. Please contact your care team if you have questions.     HYDROcodone-acetaminophen 5-325 MG per tablet   Commonly known as:  NORCO   Stopped by:  Jessi Chapman MD                Where to get your medicines      These medications were sent to [facility] [address] [place] [address], " "[place] - [address] [facility]  [address] [facility] [address]     Phone:  [phone]     erythromycin ophthalmic ointment                Primary Care Provider    Physician No Ref-Primary       No address on file        Thank you!     Thank you for choosing [facility] OPHTHALMOLOG[facility]  for your care. Our goal is always to provide you with excellent care. Hearing back from our patients is one way we can continue to improve our services. Please take a few minutes to complete the written survey that you may receive in the mail after your visit with us. Thank you!             Your Updated Medication List - Protect others around you: Learn how to safely use, store and throw away your medicines at www.disposemymeds.org.          This list is accurate as of: 5/16/17 12:04 PM.  Always use your most recent med list.                   Brand Name Dispense Instructions for use    * carboxymethylcellulose 1 % ophthalmic solution    CELLUVISC/REFRESH LIQUIGEL    90 each    Place 1 drop into both eyes 4 times daily Dispose of dropperette within 24 hours after opening or if the tip is contaminated.       * carboxymethylcellulose 1 % ophthalmic solution    CELLUVISC/REFRESH LIQUIGEL    90 each    Place 1 drop into both eyes every hour (while awake) Dispose of dropperette within 24 hours after opening or if the tip is contaminated.       cycloSPORINE 0.05 % ophthalmic emulsion    RESTASIS    3 Box    Place 1 drop into both eyes 2 times daily       erythromycin ophthalmic ointment    ROMYCIN    3.5 g    Apply 1/2\" strip to left eye three times a day       fluorometholone 0.1 % ophthalmic susp    FML LIQUIFILM    15 mL    Place 1 drop into both eyes 4 times daily       SUMAtriptan 100 MG tablet    IMITREX     at onset of headache       TYLENOL PO      Take by mouth every 4 hours as needed       * Notice:  This list has 2 medication(s) that are the same as other medications prescribed for you. Read the directions carefully, and " ask your doctor or other care provider to review them with you.

## 2017-05-16 NOTE — PROGRESS NOTES
Chief Complaints and History of Present Illnesses   Patient presents with     Post Op (Ophthalmology) Left Eye     POD#5 s/p LLL retraction repair with conjunctivoplasty Alloderm (acellular cross-linked porcine dermall collagen) graft, lateral canthopexy and temporary Blunt suture tarsorrhaphy     Chloe Cleaning is a 21 year old F who presents for POD#5 f/u s/p LLL retraction repair with conjunctivoplasty, Alloderm graft, lateral canthopexy, and temporary Blunt suture tarsorrhaphy. Pt reports minimal pain - currently reports tearing and discomfort in left eye.     Scheduled for same procedure on right side on 6/8/17.          Assessment & Plan     Chloe Cleaning is a 21 year old female with the following diagnoses:   1. Postoperative eye state       Doing well  Frost suture removed  Scheduled for same procedure on right side on 6/8/17    Agree with above, looks great. Cornea clear. Proceed with right lower lid 6/8/17     Complete documentation of historical and exam elements from today's encounter can be found in the full encounter summary report (not reduplicated in this progress note). I personally obtained the chief complaint(s) and history of present illness.  I confirmed and edited as necessary the review of systems, past medical/surgical history, family history, social history, and examination findings as documented by others; and I examined the patient myself. I personally reviewed the relevant tests, images, and reports as documented above. I formulated and edited as necessary the assessment and plan and discussed the findings and management plan with the patient and family. - Jessi Chapman MD

## 2017-06-02 ENCOUNTER — TELEPHONE (OUTPATIENT)
Dept: OPHTHALMOLOGY | Facility: CLINIC | Age: 22
End: 2017-06-02

## 2017-06-05 ENCOUNTER — OFFICE VISIT (OUTPATIENT)
Dept: OPHTHALMOLOGY | Facility: CLINIC | Age: 22
End: 2017-06-05

## 2017-06-05 DIAGNOSIS — H16.9 KERATITIS: ICD-10-CM

## 2017-06-05 DIAGNOSIS — H18.893 LIMBAL STEM CELL DEFICIENCY, BILATERAL: Primary | ICD-10-CM

## 2017-06-05 DIAGNOSIS — H02.539 EYELID RETRACTION OR LAG: ICD-10-CM

## 2017-06-05 RX ORDER — CARBOXYMETHYLCELLULOSE SODIUM 5 MG/ML
1 SOLUTION/ DROPS OPHTHALMIC
Qty: 70 EACH | Refills: 3 | Status: SHIPPED | OUTPATIENT
Start: 2017-06-05 | End: 2018-02-21 | Stop reason: DRUGHIGH

## 2017-06-05 RX ORDER — OFLOXACIN 3 MG/ML
1 SOLUTION/ DROPS OPHTHALMIC 2 TIMES DAILY
Qty: 1 BOTTLE | Refills: 0 | Status: SHIPPED | OUTPATIENT
Start: 2017-06-05 | End: 2017-06-08

## 2017-06-05 ASSESSMENT — VISUAL ACUITY
OS_CC+: -2
CORRECTION_TYPE: GLASSES
OS_CC: 20/50
OD_CC+: -2
OD_CC: 20/50
METHOD: SNELLEN - LINEAR

## 2017-06-05 ASSESSMENT — TONOMETRY
OS_IOP_MMHG: 14
OD_IOP_MMHG: 11
IOP_METHOD: ICARE

## 2017-06-05 NOTE — MR AVS SNAPSHOT
After Visit Summary   6/5/2017    Chloe Cleaning    MRN: [id number]           Patient Information     Date Of Birth          1995        Visit Information        Provider Department      6/5/2017 2:30 PM Satya Lal MD; [employer] [facility]        Today's Diagnoses     Limbal stem cell deficiency, bilateral - Both Eyes    -  1    Keratitis - Both Eyes        Eyelid retraction or lag - Both Eyes           Follow-ups after your visit        Follow-up notes from your care team     Return in about 1 week (around 6/12/2017).      Your next 10 appointments already scheduled     Jun 08, 2017   Procedure with Jessi Chapman MD   [facility] ([facility])    [address] [place] [address]  5th [facility] [address] [phone]           Located in the Clinics and Surgery Center at [address] [place] [address].   parking is very convenient and highly recommended.  is a $6 flat rate fee.  Both  and self parkers should enter the main arrival plaza from [facility]; parking attendants will direct you based on your parking preference.            Jun 12, 2017  9:00 AM CDT   RETURN CORNEA with Rey Gonzalez MD   Eye Clinic ([facility])    [address]  [address] [facility]  [address]  [facility] [id number] [phone]            Jun 13, 2017 11:00 AM CDT   (Arrive by 10:45 AM)   Post-Op with Jessi Chapman MD   [facility] ([facility] [place])    [address] [place] [address]  4th [facility] [address] [phone]              Who to contact     Please call your clinic at [phone] to:    Ask questions about your health    Make or cancel appointments    Discuss your medicines    Learn about your test results    Speak to your doctor   If you have compliments or concerns about an experience at your clinic, or if you wish to file a  complaint, please contact [facility] Patient Relations at [phone] or email us at [email or web address]         Additional Information About Your Visit        Nanofiber SolutionsharRentMonitor Information     Adlyfe is an electronic gateway that provides easy, online access to your medical records. With Adlyfe, you can request a clinic appointment, read your test results, renew a prescription or communicate with your care team.     To sign up for Adlyfe visit the website at [email or web address]   You will be asked to enter the access code listed below, as well as some personal information. Please follow the directions to create your username and password.     Your access code is: [email or web address]  Expires: 2017  8:30 AM     Your access code will  in 90 days. If you need help or a new code, please contact your [facility] or call [phone] for assistance.        Care EveryWhere ID     This is your Care EveryWhere ID. This could be used by other organizations to access your [place] medical records  [id number]        Your Vitals Were     Last Period                   2017 (Approximate)            Blood Pressure from Last 3 Encounters:   17 107/69   17 112/79   10/24/16 112/70    Weight from Last 3 Encounters:   17 68 kg (150 lb)   17 67 kg (147 lb 12.8 oz)   10/24/16 71.8 kg (158 lb 4.8 oz)              Today, you had the following     No orders found for display         Today's Medication Changes          These changes are accurate as of: 17  4:38 PM.  If you have any questions, ask your nurse or doctor.               Start taking these medicines.        Dose/Directions    ofloxacin 0.3 % ophthalmic solution   Commonly known as:  OCUFLOX   Used for:  Limbal stem cell deficiency, bilateral   Started by:  Satya Lal MD        Dose:  1 drop   Place 1 drop into both eyes 2 times daily Use while bandage contact lens  are in place   Quantity:  1 Bottle   Refills:  0         These medicines have changed or have updated prescriptions.        Dose/Directions    * carboxymethylcellulose 1 % ophthalmic solution   Commonly known as:  CELLUVISC/REFRESH LIQUIGEL   This may have changed:  Another medication with the same name was added. Make sure you understand how and when to take each.   Used for:  Dry eyes, bilateral, Limbal stem cell deficiency, bilateral, Pannus (corneal), bilateral   Changed by:  Rey Gonzalez MD        Dose:  1 drop   Place 1 drop into both eyes 4 times daily Dispose of dropperette within 24 hours after opening or if the tip is contaminated.   Quantity:  90 each   Refills:  11       * carboxymethylcellulose 1 % ophthalmic solution   Commonly known as:  CELLUVISC/REFRESH LIQUIGEL   This may have changed:  Another medication with the same name was added. Make sure you understand how and when to take each.   Used for:  Limbal stem cell deficiency, bilateral, Dry eyes, bilateral   Changed by:  Rey Gonzalez MD        Dose:  1 drop   Place 1 drop into both eyes every hour (while awake) Dispose of dropperette within 24 hours after opening or if the tip is contaminated.   Quantity:  90 each   Refills:  4       * carboxymethylcellulose 0.5 % Soln ophthalmic solution   Commonly known as:  carboxymethylcellulose sodium   This may have changed:  You were already taking a medication with the same name, and this prescription was added. Make sure you understand how and when to take each.   Used for:  Eyelid retraction or lag, Limbal stem cell deficiency, bilateral   Changed by:  Satya Lal MD        Dose:  1 drop   Place 1 drop into both eyes 6 times daily   Quantity:  70 each   Refills:  3       * Notice:  This list has 3 medication(s) that are the same as other medications prescribed for you. Read the directions carefully, and ask your doctor or other care provider to review them with you.         Where to  "get your medicines      These medications were sent to [facility] [address] [place] [address] - [address] [facility]  [address] [facility] [address]     Phone:  [phone]     carboxymethylcellulose 0.5 % Soln ophthalmic solution    ofloxacin 0.3 % ophthalmic solution                Primary Care Provider    Physician No Ref-Primary       No address on file        Thank you!     Thank you for choosing [facility] OPHTHAL[facility]OLOG[facility]  for your care. Our goal is always to provide you with excellent care. Hearing back from our patients is one way we can continue to improve our services. Please take a few minutes to complete the written survey that you may receive in the mail after your visit with us. Thank you!             Your Updated Medication List - Protect others around you: Learn how to safely use, store and throw away your medicines at www.disposemymeds.org.          This list is accurate as of: 6/5/17  4:38 PM.  Always use your most recent med list.                   Brand Name Dispense Instructions for use    * carboxymethylcellulose 1 % ophthalmic solution    CELLUVISC/REFRESH LIQUIGEL    90 each    Place 1 drop into both eyes 4 times daily Dispose of dropperette within 24 hours after opening or if the tip is contaminated.       * carboxymethylcellulose 1 % ophthalmic solution    CELLUVISC/REFRESH LIQUIGEL    90 each    Place 1 drop into both eyes every hour (while awake) Dispose of dropperette within 24 hours after opening or if the tip is contaminated.       * carboxymethylcellulose 0.5 % Soln ophthalmic solution    carboxymethylcellulose sodium    70 each    Place 1 drop into both eyes 6 times daily       cycloSPORINE 0.05 % ophthalmic emulsion    RESTASIS    3 Box    Place 1 drop into both eyes 2 times daily       erythromycin ophthalmic ointment    ROMYCIN    3.5 g    Apply 1/2\" strip to left eye three times a day       fluorometholone 0.1 % ophthalmic susp    FML LIQUIFILM    15 mL    Place 1 " drop into both eyes 4 times daily       ofloxacin 0.3 % ophthalmic solution    OCUFLOX    1 Bottle    Place 1 drop into both eyes 2 times daily Use while bandage contact lens are in place       SUMAtriptan 100 MG tablet    IMITREX     at onset of headache       TYLENOL PO      Take by mouth every 4 hours as needed       * Notice:  This list has 3 medication(s) that are the same as other medications prescribed for you. Read the directions carefully, and ask your doctor or other care provider to review them with you.

## 2017-06-05 NOTE — NURSING NOTE
Chief Complaints and History of Present Illnesses   Patient presents with     Post Op (Ophthalmology) Left Eye     POD#5 s/p LLL retraction repair with conjunctivoplasty Alloderm (acellular cross-linked porcine dermall collagen) graft, lateral canthopexy and temporary Blunt suture tarsorrhaphy     HPI    Affected eye(s):  Both   Symptoms:     Itching   Burning            Comments:  S/p POD#5 s/p LLL retraction repair with conjunctivoplasty Alloderm (acellular cross-linked porcine dermall collagen) graft, lateral canthopexy and temporary Blunt suture tarsorrhaphy.    Starting yesterday both eyes, irritation, burning, painful. She is using ointment TID OS, drop QID OD and one drop BID OD.    Roberta Holcomb COT 3:14 PM June 5, 2017

## 2017-06-05 NOTE — PROGRESS NOTES
Chief Complaints and History of Present Illnesses   Patient presents with     Post Op (Ophthalmology) Left Eye     POD#5 s/p LLL retraction repair with conjunctivoplasty Alloderm (acellular cross-linked porcine dermall collagen) graft, lateral canthopexy and temporary Blunt suture tarsorrhaphy           She reports having burning around both eyes.  She had surgery about three weeks ago to improve the position of the left lower eyelid.  There was planned surgery to improve the position of the right lower eyelid as well.     She reports that last several days her eyes have been hurting though last night was very bad and she was unable to sleep.        Assessment & Plan     Chloe Cleaning is a 21 year old female with the following diagnoses:   1. Limbal stem cell deficiency, bilateral - Both Eyes    2. Keratitis - Both Eyes    3. Eyelid retraction or lag - Both Eyes         This appears to be an exacerbation of her underlying disease.  The left lower eyelid is in good position and I don't think her pain today is related to her surgery.      PLAN:  - Place contact bandage contact lens (Air optix night and day OU)  - Start ocuflox BID while contacts in place  - Continue tears while  - Follow up with Dr. Gonzalez later this week  - Will discuss upcoming right lower eyelid surgery with Dr. Mauricio, may need to postpone.               Attending Physician Attestation:  I have seen and examined this patient.  I have confirmed and edited as necessary the chief complaint(s), history of present illness, review of systems, relevant history, and examination findings as documented by others.  I have personally reviewed the relevant tests, images, and reports as documented above.  I have confirmed and edited as necessary the assessment and plan and agree with this note.    - Talmage Broadbent MD, PhD 4:07 PM 6/5/2017

## 2017-06-07 ENCOUNTER — ANESTHESIA EVENT (OUTPATIENT)
Dept: SURGERY | Facility: AMBULATORY SURGERY CENTER | Age: 22
End: 2017-06-07

## 2017-06-08 ENCOUNTER — ANESTHESIA (OUTPATIENT)
Dept: SURGERY | Facility: AMBULATORY SURGERY CENTER | Age: 22
End: 2017-06-08

## 2017-06-08 ENCOUNTER — HOSPITAL ENCOUNTER (OUTPATIENT)
Facility: AMBULATORY SURGERY CENTER | Age: 22
End: 2017-06-08
Attending: OPHTHALMOLOGY

## 2017-06-08 ENCOUNTER — SURGERY (OUTPATIENT)
Age: 22
End: 2017-06-08

## 2017-06-08 VITALS
SYSTOLIC BLOOD PRESSURE: 121 MMHG | OXYGEN SATURATION: 100 % | WEIGHT: 150 LBS | BODY MASS INDEX: 28.32 KG/M2 | RESPIRATION RATE: 16 BRPM | HEIGHT: 61 IN | DIASTOLIC BLOOD PRESSURE: 75 MMHG | TEMPERATURE: 97.7 F

## 2017-06-08 DIAGNOSIS — Z98.890 POSTOPERATIVE EYE STATE: Primary | ICD-10-CM

## 2017-06-08 DEVICE — IMPLANTABLE DEVICE: Type: IMPLANTABLE DEVICE | Site: EYE | Status: FUNCTIONAL

## 2017-06-08 RX ORDER — SODIUM CHLORIDE, SODIUM LACTATE, POTASSIUM CHLORIDE, CALCIUM CHLORIDE 600; 310; 30; 20 MG/100ML; MG/100ML; MG/100ML; MG/100ML
INJECTION, SOLUTION INTRAVENOUS CONTINUOUS
Status: DISCONTINUED | OUTPATIENT
Start: 2017-06-08 | End: 2017-06-08 | Stop reason: HOSPADM

## 2017-06-08 RX ORDER — LIDOCAINE HYDROCHLORIDE 20 MG/ML
INJECTION, SOLUTION INFILTRATION; PERINEURAL PRN
Status: DISCONTINUED | OUTPATIENT
Start: 2017-06-08 | End: 2017-06-08

## 2017-06-08 RX ORDER — ERYTHROMYCIN 5 MG/G
OINTMENT OPHTHALMIC
Qty: 3.5 G | Refills: 0 | Status: SHIPPED | OUTPATIENT
Start: 2017-06-13 | End: 2017-11-19

## 2017-06-08 RX ORDER — LIDOCAINE 40 MG/G
CREAM TOPICAL
Status: DISCONTINUED | OUTPATIENT
Start: 2017-06-08 | End: 2017-06-08 | Stop reason: HOSPADM

## 2017-06-08 RX ORDER — PROPOFOL 10 MG/ML
INJECTION, EMULSION INTRAVENOUS CONTINUOUS PRN
Status: DISCONTINUED | OUTPATIENT
Start: 2017-06-08 | End: 2017-06-08

## 2017-06-08 RX ORDER — PROMETHAZINE HYDROCHLORIDE 25 MG/ML
12.5 INJECTION, SOLUTION INTRAMUSCULAR; INTRAVENOUS
Status: DISCONTINUED | OUTPATIENT
Start: 2017-06-08 | End: 2017-06-09 | Stop reason: HOSPADM

## 2017-06-08 RX ORDER — ACETAMINOPHEN 325 MG/1
975 TABLET ORAL ONCE
Status: COMPLETED | OUTPATIENT
Start: 2017-06-08 | End: 2017-06-08

## 2017-06-08 RX ORDER — HYDROCODONE BITARTRATE AND ACETAMINOPHEN 5; 325 MG/1; MG/1
1 TABLET ORAL
Status: DISCONTINUED | OUTPATIENT
Start: 2017-06-08 | End: 2017-06-09 | Stop reason: HOSPADM

## 2017-06-08 RX ORDER — PROPOFOL 10 MG/ML
INJECTION, EMULSION INTRAVENOUS PRN
Status: DISCONTINUED | OUTPATIENT
Start: 2017-06-08 | End: 2017-06-08

## 2017-06-08 RX ORDER — FENTANYL CITRATE 50 UG/ML
25-50 INJECTION, SOLUTION INTRAMUSCULAR; INTRAVENOUS
Status: DISCONTINUED | OUTPATIENT
Start: 2017-06-08 | End: 2017-06-08 | Stop reason: HOSPADM

## 2017-06-08 RX ORDER — SODIUM CHLORIDE, SODIUM LACTATE, POTASSIUM CHLORIDE, CALCIUM CHLORIDE 600; 310; 30; 20 MG/100ML; MG/100ML; MG/100ML; MG/100ML
INJECTION, SOLUTION INTRAVENOUS CONTINUOUS
Status: DISCONTINUED | OUTPATIENT
Start: 2017-06-08 | End: 2017-06-09 | Stop reason: HOSPADM

## 2017-06-08 RX ORDER — NALOXONE HYDROCHLORIDE 0.4 MG/ML
.1-.4 INJECTION, SOLUTION INTRAMUSCULAR; INTRAVENOUS; SUBCUTANEOUS
Status: DISCONTINUED | OUTPATIENT
Start: 2017-06-08 | End: 2017-06-09 | Stop reason: HOSPADM

## 2017-06-08 RX ORDER — ONDANSETRON 2 MG/ML
4 INJECTION INTRAMUSCULAR; INTRAVENOUS EVERY 30 MIN PRN
Status: DISCONTINUED | OUTPATIENT
Start: 2017-06-08 | End: 2017-06-09 | Stop reason: HOSPADM

## 2017-06-08 RX ORDER — ERYTHROMYCIN 5 MG/G
OINTMENT OPHTHALMIC PRN
Status: DISCONTINUED | OUTPATIENT
Start: 2017-06-08 | End: 2017-06-08 | Stop reason: HOSPADM

## 2017-06-08 RX ORDER — ONDANSETRON 4 MG/1
4 TABLET, ORALLY DISINTEGRATING ORAL EVERY 30 MIN PRN
Status: DISCONTINUED | OUTPATIENT
Start: 2017-06-08 | End: 2017-06-09 | Stop reason: HOSPADM

## 2017-06-08 RX ORDER — MEPERIDINE HYDROCHLORIDE 25 MG/ML
12.5 INJECTION INTRAMUSCULAR; INTRAVENOUS; SUBCUTANEOUS
Status: DISCONTINUED | OUTPATIENT
Start: 2017-06-08 | End: 2017-06-09 | Stop reason: HOSPADM

## 2017-06-08 RX ORDER — NEOMYCIN SULFATE, POLYMYXIN B SULFATE AND DEXAMETHASONE 3.5; 10000; 1 MG/ML; [USP'U]/ML; MG/ML
1 SUSPENSION/ DROPS OPHTHALMIC 3 TIMES DAILY
Qty: 1 BOTTLE | Refills: 0 | Status: SHIPPED | OUTPATIENT
Start: 2017-06-08 | End: 2018-02-21

## 2017-06-08 RX ORDER — HYDROCODONE BITARTRATE AND ACETAMINOPHEN 5; 325 MG/1; MG/1
1 TABLET ORAL EVERY 6 HOURS PRN
Qty: 15 TABLET | Refills: 0 | Status: SHIPPED | OUTPATIENT
Start: 2017-06-08 | End: 2017-11-19

## 2017-06-08 RX ADMIN — PROPOFOL 50 MG: 10 INJECTION, EMULSION INTRAVENOUS at 12:06

## 2017-06-08 RX ADMIN — PROPOFOL 50 MG: 10 INJECTION, EMULSION INTRAVENOUS at 12:16

## 2017-06-08 RX ADMIN — SODIUM CHLORIDE, SODIUM LACTATE, POTASSIUM CHLORIDE, CALCIUM CHLORIDE: 600; 310; 30; 20 INJECTION, SOLUTION INTRAVENOUS at 11:10

## 2017-06-08 RX ADMIN — ERYTHROMYCIN 1 G: 5 OINTMENT OPHTHALMIC at 12:41

## 2017-06-08 RX ADMIN — ACETAMINOPHEN 975 MG: 325 TABLET ORAL at 11:08

## 2017-06-08 RX ADMIN — PROPOFOL 20 MG: 10 INJECTION, EMULSION INTRAVENOUS at 12:02

## 2017-06-08 RX ADMIN — PROPOFOL 30 MG: 10 INJECTION, EMULSION INTRAVENOUS at 12:03

## 2017-06-08 RX ADMIN — SODIUM CHLORIDE, SODIUM LACTATE, POTASSIUM CHLORIDE, CALCIUM CHLORIDE: 600; 310; 30; 20 INJECTION, SOLUTION INTRAVENOUS at 11:58

## 2017-06-08 RX ADMIN — PROPOFOL 50 MG: 10 INJECTION, EMULSION INTRAVENOUS at 12:04

## 2017-06-08 RX ADMIN — LIDOCAINE HYDROCHLORIDE 40 MG: 20 INJECTION, SOLUTION INFILTRATION; PERINEURAL at 12:01

## 2017-06-08 RX ADMIN — PROPOFOL 100 MCG/KG/MIN: 10 INJECTION, EMULSION INTRAVENOUS at 12:03

## 2017-06-08 RX ADMIN — PROPOFOL 50 MG: 10 INJECTION, EMULSION INTRAVENOUS at 12:05

## 2017-06-08 NOTE — IP AVS SNAPSHOT
[facility]    [address] [place] [address]    Phone:  [phone]    Fax:  [phone]                                       After Visit Summary   6/8/2017    Chloe Cleaning    MRN: [id number]           After Visit Summary Signature Page     I have received my discharge instructions, and my questions have been answered. I have discussed any challenges I see with this plan with the nurse or doctor.    ..........................................................................................................................................  Patient/Patient Representative Signature      ..........................................................................................................................................  Patient Representative Print Name and Relationship to Patient    ..................................................               ................................................  Date                                            Time    ..........................................................................................................................................  Reviewed by Signature/Title    ...................................................              ..............................................  Date                                                            Time

## 2017-06-08 NOTE — DISCHARGE INSTRUCTIONS
[facility]  Home Care Following Anesthesia  For 24 hours after surgery:  1. Get plenty of rest.  A responsible adult must stay with you for at least 24 hours after you leave the surgery center.  2. Do not drive or use heavy equipment.  If you have weakness or tingling, don't drive or use heavy equipment until this feeling goes away.   3. Do not drink alcohol.   4. Avoid strenuous or risky activities.  Ask for help when climbing stairs.  5. You may feel lightheaded.  IF so, sit for a few minutes before standing.  Have someone help you get up.   6. If you have nausea (feel sick to your stomach): Drink only clear liquids such as apple juice, ginger ale, broth or 7-Up.  Rest may also help.  Be sure to drink enough fluids.  Move to a regular diet as you feel able.   7. You may have a slight fever.  Call the doctor if your fever is over 100 F (37.7 C) (taken under the tongue) or lasts longer than 24 hours.  8. You may have a dry mouth, a sore throat, muscle aches or trouble sleeping. These should go away after 24 hours.  9. Do not make important or legal decisions.     Tips for taking pain medications  To get the best pain relief possible, remember these points:    Take pain medications as directed, before pain becomes severe.    Pain medication can upset your stomach: taking it with food may help.    Constipation is a common side effect of pain medication. Drink plenty of  fluids.    Eat foods high in fiber. Take a stool softener if recommended by your doctor or pharmacist.    Do not drink alcohol, drive or operate machinery while taking pain medications.    Ask about other ways to control pain, such as with heat, ice or relaxation.    Call a doctor for any of the followin. Signs of infection (fever, growing tenderness at the surgery site, a large amount of drainage or bleeding, severe pain, foul-smelling drainage, redness, swelling).  2. It has been over 8 to 10 hours since  surgery and you are still not able to urinate (pass water).  3. Headache for over 24 hours.  Your doctor is:       Dr. Jessi Chapman, Ophthalmology: [phone]               Or dial [phone] and ask for the resident on call for:  Ophthalmology  For emergency care, call the:  [facility]:  [phone] (TTY for hearing impaired: [phone])    Post-operative Instructions  Ophthalmic Plastic and Reconstructive Surgery    Jessi Chapman M.D.     All instructions apply to the operated eye(s) or eyelid(s).    Wound care and personal care  ? If a patch or bandage has been placed, please leave this in place until seen by your physician. Ensure that the bandage does not get wet when you take a shower.  ? Apply ice compresses 15 minutes on 15 minutes off while awake for 2 days, then switch to warm water compresses 4 times a day until seen by your physician. For warm packs you can place a cup of dry uncooked rice in a clean cotton sock. Then place sock in microwave 30 seconds to one minute. Next place the warm sock into a plastic bag and wrap the bag with clean warm wet washcloth and place over operated eye.    ? You may shower or wash your hair the day after surgery. Do not bathe or go swimming for 1 week to prevent contamination of your wounds.  ? Do not apply make-up to the eyes or eyelids for 2 weeks after surgery.  ? Expect some swelling, bruising, black eye (even into the lower eyelids and cheeks). Also expect serum caking, crusting and discharge from the eye and/or incisions. A small amount of surface bleeding is normal for the first 48 hours.  ? Your eye(s) and eyelid(s) may be painful and tender. This is normal after surgery.  Use the pain medication as prescribed. If your pain does not improve despite the  medication, contact the office.    Contact information and follow-up  ? Return to the Eye Clinic for a follow-up appointment with your physician as  scheduled. If no appointment  has been scheduled:   - [facility] eye clinic: [phone] for an appointment with Dr. Chapman within 1 to 2 weeks from your date of surgery.   -  [facility] eye [facility]: [phone] for an appointment with Dr. Chapman within 1 to 2 weeks from your date of surgery.     ? For severe pain, bleeding, or loss of vision, call the [facility] at [phone] or [facility] at [phone].     After hours or on weekends and holidays, call [phone] and ask to speak with the ophthalmologist on call.    An on call person can be reached after hours for concerns. The on call doctor should not call in medication refill requests after hours or on weekends, so please plan accordingly. An effort has been made to provide adequate pain medications following every surgery, and refills will not be provided in most instances. Narcotic pain medications cannot be called in.     Activity restrictions and driving  ? Avoid heavy lifting, bending, exercise or strenuous activity for 1 week after surgery.  You may resume other activities and return to work as tolerated.  ? You may not resume driving until have you stopped using narcotic pain medications (such as Norco, Percocet, Tylenol #3).    Medications  ? Restart all your regular home medications and eye drops. If you take Plavix or  Aspirin on a regular basis, wait for 72 hours after your surgery before restarting these in order to decrease the risk of bleeding complications.  ? Avoid aspirin and aspirin-like medications (Motrin, Aleve, Ibuprofen, Marlin-  Sylva etc) for 72 hours to reduce the risk of bleeding. You may take Tylenol  (acetaminophen) for pain.  ? In addition to your home medications, take the following post-operative medications as prescribed by your physician.    ? Apply antibiotic ointment to all sutures three times a day, and into the operated eye(s) at night after the dressing has been  removed.  ? Instill eye drops 3 times a day for 10 days after the dressing has been removed.   ? Take pain pills at prescribed frequency as needed for pain.    ? WARNING: All the prescription pain medications listed above contain Tylenol  (acetaminophen). You must not take more than 4,000 mg of acetaminophen per  24-hour period. This is equivalent to 6 tablets of Darvocet, 12 tablets of Norco, Percocet or Tylenol #3. If you take other over-the-counter medications containing acetaminophen, you must take the amount of acetaminophen into account and reduce the number of prescribed pain pills accordingly.  ? The prescribed medications may make you drowsy. You must not drive a car,  operate heavy machinery or drink alcohol while taking them.  ? The prescribed pain medications may cause constipation and nausea. Take them  with some food to prevent a stomach upset. If you continue to experience nausea,  call your physician.

## 2017-06-08 NOTE — ANESTHESIA CARE TRANSFER NOTE
Patient: Chloe Cleaning    Procedure(s):  Right Lower Eyelid Retraction Repair with Acellular Dermis Graft and Temporary Tarsorrhaphy - Wound Class: I-Clean   - Wound Class: I-Clean    Diagnosis: Eyelid Retraction  Diagnosis Additional Information: No value filed.    Anesthesia Type:   MAC     Note:  Airway :Room Air  Patient transferred to:Phase II  Comments: To phase 2, vss report to RN    105/65, 87, 15, 36.2, 97%      Vitals: (Last set prior to Anesthesia Care Transfer)    CRNA VITALS  6/8/2017 1219 - 6/8/2017 1255      6/8/2017             Pulse: 95    Ht Rate: 93    SpO2: 97 %    Resp Rate (set): 10                Electronically Signed By: INA Argueta CRNA  June 8, 2017  12:55 PM

## 2017-06-08 NOTE — ANESTHESIA POSTPROCEDURE EVALUATION
Patient: Chloe Cleaning    Procedure(s):  Right Lower Eyelid Retraction Repair with Acellular Dermis Graft and Temporary Tarsorrhaphy - Wound Class: I-Clean   - Wound Class: I-Clean    Diagnosis:Eyelid Retraction  Diagnosis Additional Information: No value filed.    Anesthesia Type:  MAC    Note:  Anesthesia Post Evaluation    Patient location during evaluation: Phase 2  Patient participation: Able to fully participate in evaluation  Level of consciousness: awake and alert  Pain management: adequate  Airway patency: patent  Cardiovascular status: hemodynamically stable  Respiratory status: nonlabored ventilation, spontaneous ventilation and room air  Hydration status: euvolemic  PONV: none     Anesthetic complications: None          Last vitals:  Vitals:    06/08/17 1010 06/08/17 1255   BP: 112/78 105/65   Resp: 16 12   Temp: 36.8  C (98.3  F) 36.2  C (97.2  F)   SpO2: 100% 100%         Electronically Signed By: Jb Stanton MD  June 8, 2017  12:59 PM

## 2017-06-08 NOTE — OP NOTE
PREOPERATIVE DIAGNOSIS: Right lower eyelid retraction.   POSTOPERATIVE DIAGNOSIS: Right  lower eyelid retraction.   PROCEDURE PERFORMED: Right lower eyelid retraction repair with conjunctivoplasty, alloderm graft, lateral canthopexy and temporary Blunt suture tarsorrhaphy.   SURGEON: Jessi Chapman MD.   ASSISTANTS: Jasmine Chin MD  ANESTHESIA: Monitored with local infiltration of a 50/50 mixture of 2% lidocaine with epinephrine and 0.5% Marcaine.   COMPLICATIONS: None.   ESTIMATED BLOOD LOSS: Less than 5 mL.   HISTORY OF PRESENT ILLNESS: Chloe Cleaning  presented with right lower lid retraction leading to exposure keratitis. After the risks, benefits and alternatives to the proposed procedure were explained, informed consent was obtained.   DESCRIPTION OF PROCEDURE: hCloe Cleaning was brought to the operating room and placed supine on the operating table.  The right  lower lid lateral canthus, upper lid and brow were infiltrated with local anesthetic. Anesthesia was infiltrated. The area prepped and draped in the typical sterile ophthalmic fashion. Both contact lenses were removed. Attention was directed to the right side. Lateral canthal incision was made with a 15-blade and dissection carried down through the orbicularis with monopolar cautery. Lateral canthotomy and inferior cantholysis was performed. Transconjunctival incision was performed with cautery and Jonathan scissors at the inferior tarsal border releasing the lower eyelid retractors and conjunctiva. The Alloderm (0.33-0.76mm) graft was soaked in gentamicin solution, trimmed to fit in the lower lid conjunctival defect and sutured in place with running 6-0 plain gut sutures, securing the graft to the inferior tarsal border superiorly and inferiorly to the inferior conjunctival edge. The lateral tarsus was reattached to the lateral orbital rim periosteum with a double-armed 5-0 vicryl suture in a horizontal mattress fashion lateral canthopexy. Lateral  canthus was reconstructed with a 5-0 Vicryl suture. Skin was closed with interrupted 6-0 plain gut sutures. A 5-0 Prolene suture was placed in a Frost suture technique through the lower lid, upper lid and brow and tied over a Telfa bolster. Chloe Cleaning  tolerated the procedure well and was taken to recovery room in stable condition.     Jessi Chapman MD

## 2017-06-08 NOTE — ANESTHESIA PREPROCEDURE EVALUATION
Anesthesia Evaluation     . Pt has not had prior anesthetic            ROS/MED HX    ENT/Pulmonary:  - neg pulmonary ROS     Neurologic: Comment: Patient was evaluated at the [facility] for [facility] when she first came to [facility] two years ago.  MRI report from [place] mentions numerous white matter lesions indicative of ADEM.  Tylenol works for her migraines.      (+)migraines,    (-) Multiple Sclerosis (patient denies)   Cardiovascular:  - neg cardiovascular ROS   (+) ----. : . . . :. . No previous cardiac testing       METS/Exercise Tolerance:  >4 METS   Hematologic: Comments: Hgb 11.7 7/4/2016        Musculoskeletal:   (+) , fracture lower extremity: Ankle, -       GI/Hepatic:        Other GI/Hepatic: h/o constipation.   Renal/Genitourinary:  - ROS Renal section negative       Endo:  - neg endo ROS       Psychiatric:  - neg psychiatric ROS       Infectious Disease:  - neg infectious disease ROS       Malignancy:      - no malignancy   Other:    (+) C-spine cleared: N/A, no H/O Chronic Pain,no other significant disability   - neg other ROS                 Physical Exam  Normal systems: cardiovascular and dental    Airway   Mallampati: I  TM distance: >3 FB  Neck ROM: full    Dental     Cardiovascular   Rhythm and rate: regular and normal      Pulmonary    breath sounds clear to auscultation    Other findings: Physical Exam:  Ptosis bilaterally    Lab Results      Component                Value               Date                      WBC                      6.8                 07/14/2016            Lab Results      Component                Value               Date                      RBC                      4.55                07/14/2016            Lab Results      Component                Value               Date                      HGB                      11.7                07/14/2016            Lab Results      Component                Value               Date                      HCT                       35.3                07/14/2016            Lab Results      Component                Value               Date                      MCV                      78                  07/14/2016            Lab Results      Component                Value               Date                      MCH                      25.7                07/14/2016            Lab Results      Component                Value               Date                      MCHC                     33.1                07/14/2016            Lab Results      Component                Value               Date                      RDW                      16.2                07/14/2016            Lab Results      Component                Value               Date                      PLT                      291                 07/14/2016              Last Basic Metabolic Panel:  Lab Results      Component                Value               Date                      NA                       140                 07/14/2016             Lab Results      Component                Value               Date                      POTASSIUM                3.5                 07/14/2016            Lab Results      Component                Value               Date                      CHLORIDE                 105                 07/14/2016            Lab Results      Component                Value               Date                      MARGARITA                      9.2                 07/14/2016            Lab Results      Component                Value               Date                      CO2                      22                  07/14/2016            Lab Results      Component                Value               Date                      BUN                      15                  07/14/2016            Lab Results      Component                Value               Date                      CR                       0.70                07/14/2016            Lab Results       Component                Value               Date                      GLC                      84                  07/14/2016                            Anesthesia Plan      History & Physical Review  History and physical reviewed and following examination; no interval change.    ASA Status:  1 .    NPO Status:  > 8 hours    Plan for MAC with Intravenous and Propofol induction. Maintenance will be TIVA.    PONV prophylaxis:  Ondansetron (or other 5HT-3) and Dexamethasone or Solumedrol       Postoperative Care  Postoperative pain management:  Multi-modal analgesia.      Consents  Anesthetic plan, risks, benefits and alternatives discussed with:  Patient.  Use of blood products discussed: No .   .                          .

## 2017-06-08 NOTE — IP AVS SNAPSHOT
MRN:[id number]                      After Visit Summary   6/8/2017    Chloe Cleaning    MRN: [id number]           Thank you!     Thank you for choosing [facility] for your care. Our goal is always to provide you with excellent care. Hearing back from our patients is one way we can continue to improve our services. Please take a few minutes to complete the written survey that you may receive in the mail after you visit with us. Thank you!        Patient Information     Date Of Birth          1995        About your hospital stay     You were admitted on:  June 8, 2017 You last received care in the:  [facility]    You were discharged on:  June 8, 2017       Who to Call     For medical emergencies, please call 911.  For non-urgent questions about your medical care, please call your primary care provider or clinic, None  For questions related to your surgery, please call your surgery clinic        Attending Provider     Provider Specialty    Jessi Chapman MD Ophthalmology       Primary Care Provider    Physician No Ref-Primary      After Care Instructions     Discharge Medication Instructions       Do NOT take aspirin or medications containing NSAIDS for 72 hours after procedure.            Ice to affected area       Apply cold pack for 15 minutes on, 15 minutes off, for 48 hours while awake.                  Your next 10 appointments already scheduled     Jun 12, 2017  8:45 AM CDT   RETURN CORNEA with Rey Gonzalez MD   Eye Clinic ([facility])    [place] [address]  [address] [address]   [phone]            Jun 13, 2017 11:00 AM CDT   (Arrive by 10:45 AM)   Post-Op with Jessi Chapman MD   [facility] ([facility])    [address]  [address] [facility] [address]   [phone]              Further instructions from your care team       [facility]  Home Care Following Anesthesia  For 24 hours after surgery:  1. Get plenty of rest.  A responsible adult must stay with you for at least 24 hours after you leave the surgery center.  2. Do not drive or use heavy equipment.  If you have weakness or tingling, don't drive or use heavy equipment until this feeling goes away.   3. Do not drink alcohol.   4. Avoid strenuous or risky activities.  Ask for help when climbing stairs.  5. You may feel lightheaded.  IF so, sit for a few minutes before standing.  Have someone help you get up.   6. If you have nausea (feel sick to your stomach): Drink only clear liquids such as apple juice, ginger ale, broth or 7-Up.  Rest may also help.  Be sure to drink enough fluids.  Move to a regular diet as you feel able.   7. You may have a slight fever.  Call the doctor if your fever is over 100 F (37.7 C) (taken under the tongue) or lasts longer than 24 hours.  8. You may have a dry mouth, a sore throat, muscle aches or trouble sleeping. These should go away after 24 hours.  9. Do not make important or legal decisions.     Tips for taking pain medications  To get the best pain relief possible, remember these points:    Take pain medications as directed, before pain becomes severe.    Pain medication can upset your stomach: taking it with food may help.    Constipation is a common side effect of pain medication. Drink plenty of  fluids.    Eat foods high in fiber. Take a stool softener if recommended by your doctor or pharmacist.    Do not drink alcohol, drive or operate machinery while taking pain medications.    Ask about other ways to control pain, such as with heat, ice or relaxation.    Call a doctor for any of the followin. Signs of infection (fever, growing tenderness at the surgery site, a large amount of drainage or bleeding, severe pain, foul-smelling drainage, redness, swelling).  2. It has been over 8 to 10 hours since surgery and you are  still not able to urinate (pass water).  3. Headache for over 24 hours.  Your doctor is:       Dr. Jessi Chapman, Ophthalmology: [phone]               Or dial [phone] and ask for the resident on call for:  Ophthalmology  For emergency care, call the:  [facility]:  [phone] (TTY for hearing impaired: [phone])    Post-operative Instructions  Ophthalmic Plastic and Reconstructive Surgery    Jessi Chapman M.D.     All instructions apply to the operated eye(s) or eyelid(s).    Wound care and personal care  ? If a patch or bandage has been placed, please leave this in place until seen by your physician. Ensure that the bandage does not get wet when you take a shower.  ? Apply ice compresses 15 minutes on 15 minutes off while awake for 2 days, then switch to warm water compresses 4 times a day until seen by your physician. For warm packs you can place a cup of dry uncooked rice in a clean cotton sock. Then place sock in microwave 30 seconds to one minute. Next place the warm sock into a plastic bag and wrap the bag with clean warm wet washcloth and place over operated eye.    ? You may shower or wash your hair the day after surgery. Do not bathe or go swimming for 1 week to prevent contamination of your wounds.  ? Do not apply make-up to the eyes or eyelids for 2 weeks after surgery.  ? Expect some swelling, bruising, black eye (even into the lower eyelids and cheeks). Also expect serum caking, crusting and discharge from the eye and/or incisions. A small amount of surface bleeding is normal for the first 48 hours.  ? Your eye(s) and eyelid(s) may be painful and tender. This is normal after surgery.  Use the pain medication as prescribed. If your pain does not improve despite the  medication, contact the office.    Contact information and follow-up  ? Return to the Eye Clinic for a follow-up appointment with your physician as  scheduled. If no appointment has been  scheduled:   - [facility] eye clinic: [phone] for an appointment with Dr. Chapman within 1 to 2 weeks from your date of surgery.   -  [facility]: [phone] for an appointment with Dr. Chapman within 1 to 2 weeks from your date of surgery.     ? For severe pain, bleeding, or loss of vision, call the [facility] at [phone] or [facility] at [phone].     After hours or on weekends and holidays, call [phone] and ask to speak with the ophthalmologist on call.    An on call person can be reached after hours for concerns. The on call doctor should not call in medication refill requests after hours or on weekends, so please plan accordingly. An effort has been made to provide adequate pain medications following every surgery, and refills will not be provided in most instances. Narcotic pain medications cannot be called in.     Activity restrictions and driving  ? Avoid heavy lifting, bending, exercise or strenuous activity for 1 week after surgery.  You may resume other activities and return to work as tolerated.  ? You may not resume driving until have you stopped using narcotic pain medications (such as Norco, Percocet, Tylenol #3).    Medications  ? Restart all your regular home medications and eye drops. If you take Plavix or  Aspirin on a regular basis, wait for 72 hours after your surgery before restarting these in order to decrease the risk of bleeding complications.  ? Avoid aspirin and aspirin-like medications (Motrin, Aleve, Ibuprofen, [place]-  [place] etc) for 72 hours to reduce the risk of bleeding. You may take Tylenol  (acetaminophen) for pain.  ? In addition to your home medications, take the following post-operative medications as prescribed by your physician.    ? Apply antibiotic ointment to all sutures three times a day, and into the operated eye(s) at night after the dressing has been removed.  ? Instill  "eye drops 3 times a day for 10 days after the dressing has been removed.   ? Take pain pills at prescribed frequency as needed for pain.    ? WARNING: All the prescription pain medications listed above contain Tylenol  (acetaminophen). You must not take more than 4,000 mg of acetaminophen per  24-hour period. This is equivalent to 6 tablets of Darvocet, 12 tablets of Norco, Percocet or Tylenol #3. If you take other over-the-counter medications containing acetaminophen, you must take the amount of acetaminophen into account and reduce the number of prescribed pain pills accordingly.  ? The prescribed medications may make you drowsy. You must not drive a car,  operate heavy machinery or drink alcohol while taking them.  ? The prescribed pain medications may cause constipation and nausea. Take them  with some food to prevent a stomach upset. If you continue to experience nausea,  call your physician.              Pending Results     No orders found from 6/6/2017 to 6/9/2017.            Admission Information     Date & Time Provider Department Dept. Phone    6/8/2017 Jessi Chapman MD [facility] [phone]      Your Vitals Were     Blood Pressure Temperature Respirations Height Weight Pulse Oximetry    105/65 97.2  F (36.2  C) (Temporal) 12 1.549 m (5' 0.98\") 68 kg (150 lb) 100%    BMI (Body Mass Index)                   28.36 kg/m2           [email or web address] Information     [employer] is an electronic gateway that provides easy, online access to your medical records. With [employer], you can request a clinic appointment, read your test results, renew a prescription or communicate with your care team.     To sign up for [employer] visit the website at [email or web address]   You will be asked to enter the access code listed below, as well as some personal information. Please follow the directions to create your username and password.     Your access code is: [email or web address]  Expires: 6/18/2017  8:30 " "AM     Your access code will  in 90 days. If you need help or a new code, please contact your [facility] or call [phone] for assistance.        Care EveryWhere ID     This is your Care EveryWhere ID. This could be used by other organizations to access your [facility] medical records  [id number]           Review of your medicines      START taking        Dose / Directions    HYDROcodone-acetaminophen 5-325 MG per tablet   Commonly known as:  NORCO   Used for:  Postoperative eye state        Dose:  1 tablet   Take 1 tablet by mouth every 6 hours as needed for pain Maximum of 4000 mg of acetaminophen in 24 hours.   Quantity:  15 tablet   Refills:  0       neomycin-polymyxin-dexamethasone 3.5-42632-9.1 Susp ophthalmic susp   Commonly known as:  MAXITROL   Used for:  Postoperative eye state        Dose:  1 drop   Place 1 drop into the right eye 3 times daily One drop to operated eye(s) three times daily for 10 days.   Quantity:  1 Bottle   Refills:  0         CONTINUE these medicines which may have CHANGED, or have new prescriptions. If we are uncertain of the size of tablets/capsules you have at home, strength may be listed as something that might have changed.        Dose / Directions    * erythromycin ophthalmic ointment   Commonly known as:  ROMYCIN   This may have changed:  Another medication with the same name was added. Make sure you understand how and when to take each.   Used for:  Postoperative eye state        Apply 1/2\" strip to left eye three times a day   Quantity:  3.5 g   Refills:  0       * erythromycin ophthalmic ointment   Commonly known as:  ROMYCIN   This may have changed:  You were already taking a medication with the same name, and this prescription was added. Make sure you understand how and when to take each.   Used for:  Postoperative eye state        Start taking on:  2017   Apply small amount to incision sites three times daily after the dressing " has been removed in clinic   Quantity:  3.5 g   Refills:  0       * Notice:  This list has 2 medication(s) that are the same as other medications prescribed for you. Read the directions carefully, and ask your doctor or other care provider to review them with you.      CONTINUE these medicines which have NOT CHANGED        Dose / Directions    * carboxymethylcellulose 1 % ophthalmic solution   Commonly known as:  CELLUVISC/REFRESH LIQUIGEL   Used for:  Dry eyes, bilateral, Limbal stem cell deficiency, bilateral, Pannus (corneal), bilateral        Dose:  1 drop   Place 1 drop into both eyes 4 times daily Dispose of dropperette within 24 hours after opening or if the tip is contaminated.   Quantity:  90 each   Refills:  11       * carboxymethylcellulose 1 % ophthalmic solution   Commonly known as:  CELLUVISC/REFRESH LIQUIGEL   Used for:  Limbal stem cell deficiency, bilateral, Dry eyes, bilateral        Dose:  1 drop   Place 1 drop into both eyes every hour (while awake) Dispose of dropperette within 24 hours after opening or if the tip is contaminated.   Quantity:  90 each   Refills:  4       * carboxymethylcellulose 0.5 % Soln ophthalmic solution   Commonly known as:  carboxymethylcellulose sodium   Used for:  Eyelid retraction or lag, Limbal stem cell deficiency, bilateral        Dose:  1 drop   Place 1 drop into both eyes 6 times daily   Quantity:  70 each   Refills:  3       cycloSPORINE 0.05 % ophthalmic emulsion   Commonly known as:  RESTASIS   Used for:  Dry eyes, bilateral, Limbal stem cell deficiency, bilateral        Dose:  1 drop   Place 1 drop into both eyes 2 times daily   Quantity:  3 Box   Refills:  3       fluorometholone 0.1 % ophthalmic susp   Commonly known as:  FML LIQUIFILM   Used for:  Keratitis        Dose:  1 drop   Place 1 drop into both eyes 4 times daily   Quantity:  15 mL   Refills:  11       SUMAtriptan 100 MG tablet   Commonly known as:  IMITREX   Used for:  Throat pain        at onset  of headache   Refills:  3       TYLENOL PO        Take by mouth every 4 hours as needed   Refills:  0       * Notice:  This list has 3 medication(s) that are the same as other medications prescribed for you. Read the directions carefully, and ask your doctor or other care provider to review them with you.      STOP taking     ofloxacin 0.3 % ophthalmic solution   Commonly known as:  OCUFLOX                Where to get your medicines      These medications were sent to [place], [place] - [address]  [address], [facility] [address]    Hours:  TRANSPLANT PHONE NUMBER [phone] Phone:  [phone]     erythromycin ophthalmic ointment    neomycin-polymyxin-dexamethasone 3.5-86384-9.1 Susp ophthalmic susp         Some of these will need a paper prescription and others can be bought over the counter. Ask your nurse if you have questions.     Bring a paper prescription for each of these medications     HYDROcodone-acetaminophen 5-325 MG per tablet                Protect others around you: Learn how to safely use, store and throw away your medicines at www.disposemymeds.org.             Medication List: This is a list of all your medications and when to take them. Check marks below indicate your daily home schedule. Keep this list as a reference.      Medications           Morning Afternoon Evening Bedtime As Needed    * carboxymethylcellulose 1 % ophthalmic solution   Commonly known as:  CELLUVISC/REFRESH LIQUIGEL   Place 1 drop into both eyes 4 times daily Dispose of dropperette within 24 hours after opening or if the tip is contaminated.                                * carboxymethylcellulose 1 % ophthalmic solution   Commonly known as:  CELLUVISC/REFRESH LIQUIGEL   Place 1 drop into both eyes every hour (while awake) Dispose of dropperette within 24 hours after opening or if the tip is contaminated.                                *  "carboxymethylcellulose 0.5 % Soln ophthalmic solution   Commonly known as:  carboxymethylcellulose sodium   Place 1 drop into both eyes 6 times daily                                cycloSPORINE 0.05 % ophthalmic emulsion   Commonly known as:  RESTASIS   Place 1 drop into both eyes 2 times daily                                * erythromycin ophthalmic ointment   Commonly known as:  ROMYCIN   Apply 1/2\" strip to left eye three times a day   Last time this was given:  1 g on 6/8/2017 12:41 PM                                * erythromycin ophthalmic ointment   Commonly known as:  ROMYCIN   Apply small amount to incision sites three times daily after the dressing has been removed in clinic   Start taking on:  6/13/2017   Last time this was given:  1 g on 6/8/2017 12:41 PM                                fluorometholone 0.1 % ophthalmic susp   Commonly known as:  FML LIQUIFILM   Place 1 drop into both eyes 4 times daily                                HYDROcodone-acetaminophen 5-325 MG per tablet   Commonly known as:  NORCO   Take 1 tablet by mouth every 6 hours as needed for pain Maximum of 4000 mg of acetaminophen in 24 hours.                                neomycin-polymyxin-dexamethasone 3.5-35633-6.1 Susp ophthalmic susp   Commonly known as:  MAXITROL   Place 1 drop into the right eye 3 times daily One drop to operated eye(s) three times daily for 10 days.                                SUMAtriptan 100 MG tablet   Commonly known as:  IMITREX   at onset of headache                                TYLENOL PO   Take by mouth every 4 hours as needed   Last time this was given:  975 mg on 6/8/2017 11:08 AM                                * Notice:  This list has 5 medication(s) that are the same as other medications prescribed for you. Read the directions carefully, and ask your doctor or other care provider to review them with you.      "

## 2017-06-12 ENCOUNTER — OFFICE VISIT (OUTPATIENT)
Dept: OPHTHALMOLOGY | Facility: CLINIC | Age: 22
End: 2017-06-12
Attending: OPHTHALMOLOGY
Payer: COMMERCIAL

## 2017-06-12 DIAGNOSIS — H16.9 KERATITIS: ICD-10-CM

## 2017-06-12 DIAGNOSIS — H02.539 EYELID RETRACTION OR LAG: ICD-10-CM

## 2017-06-12 DIAGNOSIS — H04.123 DRY EYES, BILATERAL: ICD-10-CM

## 2017-06-12 DIAGNOSIS — H18.893 LIMBAL STEM CELL DEFICIENCY, BILATERAL: Primary | ICD-10-CM

## 2017-06-12 PROCEDURE — 99213 OFFICE O/P EST LOW 20 MIN: CPT | Mod: ZF

## 2017-06-12 ASSESSMENT — TONOMETRY
OD_IOP_MMHG: TARS
IOP_METHOD: TONOPEN
OS_IOP_MMHG: 04

## 2017-06-12 ASSESSMENT — CONF VISUAL FIELD
OD_SUPERIOR_NASAL_RESTRICTION: 1
OD_INFERIOR_TEMPORAL_RESTRICTION: 1
OD_SUPERIOR_TEMPORAL_RESTRICTION: 1
OS_NORMAL: 1
OD_INFERIOR_NASAL_RESTRICTION: 1

## 2017-06-12 ASSESSMENT — VISUAL ACUITY
OD_SC: TARSO
METHOD: SNELLEN - LINEAR
OS_SC: 20/50

## 2017-06-12 NOTE — PROGRESS NOTES
CC: LSCD OU    HPI: 21 year old [place] female referred by Dr. Santizo for evaluation.  She states that both eyes have been tearing and painful for many years, history of limbal stem cell deficiency.     Interval History: Patient is now s/p eyelid surgery with plastics for correction of lower lid correction OU with conjunctival patch graft. Patient continues to have itching, burning, tearing. She had severe pain last week and saw Dr. Broadbent who placed a bandage contact lens OD. Patient states vision is stable, no flashes, no floaters.    Previously using:  PFATs QID OU  Restasis BID OU  Ofloxacin BID OD    Assessment and Plan:    1. Limbal Stem Cell Deficiency, both eyes. Baseline slit lamp photos 11/2015.   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 Mac   - restart Preservative free artificial tears Q1H OU   - continue Restasis OU twice a day    - D/C Ofloxacin (no BCL)   - minimize all ocular surface toxicity   - f/u 2-3 months      ~~~~~~~~~~~~~~~~~~~~~~~~~~~~~~~~~~~~~~~~~~~~~~~~~~~~~~~~~~~~~~~~    Complete documentation of historical and exam elements from today's encounter can be found in the full encounter summary report (not reduplicated in this progress note). I personally obtained the chief complaint(s) and history of present illness.  I confirmed and edited as necessary the review of systems, past medical/surgical history, family history, social history, and examination findings as documented by others; and I examined the patient myself. I personally reviewed the relevant tests, images, and reports as documented above. I formulated and edited as necessary the assessment and plan and discussed the findings and management plan with the patient and family.    Rey Gonzalez,  MD ORR personally spent great than 40min with the patient, of which >50% of the time was spent face to face with the patient, counseling and coordinating care with the patient. We discussed the complexity of her diagnosis, the need for further information and aggressive treatment, and the unknown prognosis for the patient at this time.    Rey Gonzalez MD

## 2017-06-12 NOTE — MR AVS SNAPSHOT
After Visit Summary   6/12/2017    Chloe Cleaning    MRN: [id number]           Patient Information     Date Of Birth          1995        Visit Information        Provider Department      6/12/2017 8:45 AM Rey Gonzalez MD; [facility]        Today's Diagnoses     Limbal stem cell deficiency, bilateral - Both Eyes    -  1    Keratitis - Both Eyes        Eyelid retraction or lag - Both Eyes        Dry eyes, bilateral           Follow-ups after your visit        Your next 10 appointments already scheduled     Jun 13, 2017 10:45 AM CDT   Post-Op with Jessi Chapman MD   [facility]hthalmology ([facility])    [address]  4th [address]  [facility] [address] [phone]            Aug 30, 2017 10:00 AM CDT   RETURN CORNEA with Rey Gonzalez MD   Eye Clinic ([facility])    [address]  [address]  [address]  [facility] [address] [phone]              Who to contact     Please call your clinic at [phone] to:    Ask questions about your health    Make or cancel appointments    Discuss your medicines    Learn about your test results    Speak to your doctor   If you have compliments or concerns about an experience at your clinic, or if you wish to file a complaint, please contact [facility] Patient Relations at [phone] or email us at [email or web address]         Additional Information About Your Visit        MyChart Information     Cloudfindt is an electronic gateway that provides easy, online access to your medical records. With Avimoto, you can request a clinic appointment, read your test results, renew a prescription or communicate with your care team.     To sign up for Cloudfindt visit the website at [email or web address]   You will be asked to enter the access code listed below, as well as some personal information. Please follow the  directions to create your username and password.     Your access code is: [email or web address]  Expires: 2017  8:30 AM     Your access code will  in 90 days. If you need help or a new code, please contact your [facility] or call [phone] for assistance.        Care EveryWhere ID     This is your Care EveryWhere ID. This could be used by other organizations to access your [place] medical records  [id number]         Blood Pressure from Last 3 Encounters:   17 121/75   17 107/69   17 112/79    Weight from Last 3 Encounters:   17 68 kg (150 lb)   17 68 kg (150 lb)   17 67 kg (147 lb 12.8 oz)              Today, you had the following     No orders found for display       Primary Care Provider    Physician No Ref-Primary       No address on file        Thank you!     Thank you for choosing EYE CLINIC  for your care. Our goal is always to provide you with excellent care. Hearing back from our patients is one way we can continue to improve our services. Please take a few minutes to complete the written survey that you may receive in the mail after your visit with us. Thank you!             Your Updated Medication List - Protect others around you: Learn how to safely use, store and throw away your medicines at www.disposemymeds.org.          This list is accurate as of: 17  9:36 AM.  Always use your most recent med list.                   Brand Name Dispense Instructions for use    * carboxymethylcellulose 1 % ophthalmic solution    CELLUVISC/REFRESH LIQUIGEL    90 each    Place 1 drop into both eyes 4 times daily Dispose of dropperette within 24 hours after opening or if the tip is contaminated.       * carboxymethylcellulose 1 % ophthalmic solution    CELLUVISC/REFRESH LIQUIGEL    90 each    Place 1 drop into both eyes every hour (while awake) Dispose of dropperette within 24 hours after opening or if the tip is contaminated.       *  "carboxymethylcellulose 0.5 % Soln ophthalmic solution    carboxymethylcellulose sodium    70 each    Place 1 drop into both eyes 6 times daily       cycloSPORINE 0.05 % ophthalmic emulsion    RESTASIS    3 Box    Place 1 drop into both eyes 2 times daily       * erythromycin ophthalmic ointment    ROMYCIN    3.5 g    Apply 1/2\" strip to left eye three times a day       * erythromycin ophthalmic ointment   Start taking on:  6/13/2017    ROMYCIN    3.5 g    Apply small amount to incision sites three times daily after the dressing has been removed in clinic       fluorometholone 0.1 % ophthalmic susp    FML LIQUIFILM    15 mL    Place 1 drop into both eyes 4 times daily       HYDROcodone-acetaminophen 5-325 MG per tablet    NORCO    15 tablet    Take 1 tablet by mouth every 6 hours as needed for pain Maximum of 4000 mg of acetaminophen in 24 hours.       neomycin-polymyxin-dexamethasone 3.5-61979-3.1 Susp ophthalmic susp    MAXITROL    1 Bottle    Place 1 drop into the right eye 3 times daily One drop to operated eye(s) three times daily for 10 days.       SUMAtriptan 100 MG tablet    IMITREX     at onset of headache       TYLENOL PO      Take by mouth every 4 hours as needed       * Notice:  This list has 5 medication(s) that are the same as other medications prescribed for you. Read the directions carefully, and ask your doctor or other care provider to review them with you.      "

## 2017-06-12 NOTE — NURSING NOTE
Chief Complaints and History of Present Illnesses   Patient presents with     Follow Up For     Limbal stem cell deficiency     HPI    Affected eye(s):  Both   Symptoms:        Frequency:  Constant       Do you have eye pain now?:  No      Comments:  States va is the same since last visit  +discomfort  Pat Guerrero COT 8:57 AM June 12, 2017

## 2017-06-13 ENCOUNTER — OFFICE VISIT (OUTPATIENT)
Dept: OPHTHALMOLOGY | Facility: CLINIC | Age: 22
End: 2017-06-13

## 2017-06-13 DIAGNOSIS — H16.9 KERATITIS: ICD-10-CM

## 2017-06-13 DIAGNOSIS — H18.893 LIMBAL STEM CELL DEFICIENCY, BILATERAL: Primary | ICD-10-CM

## 2017-06-13 ASSESSMENT — VISUAL ACUITY
CORRECTION_TYPE: GLASSES
OD_CC: 20/30
OS_CC: 20/40
OD_CC+: -2
METHOD: SNELLEN - LINEAR

## 2017-06-13 ASSESSMENT — REFRACTION_WEARINGRX
OD_AXIS: 35
OD_CYLINDER: +1.00
OS_CYLINDER: +0.75
OD_SPHERE: -1.50
OS_AXIS: 100
OS_SPHERE: -1.50

## 2017-06-13 ASSESSMENT — TONOMETRY
OD_IOP_MMHG: 15
IOP_METHOD: ICARE
OS_IOP_MMHG: 12

## 2017-06-13 NOTE — PROGRESS NOTES
Doing well post both lower eyelid retraction repair.  Both lower eyelids are in excellent position.   Still with significant upper eyelid ptosis, which they are anxious to have addressed.   I recommend not addressing until after ocular surface is in better condition.    Continue q1 hour pfats and restasis per Dr. Gonzalez.    F/u with me as needed or when decides to proceed with upper lid procedure.     Complete documentation of historical and exam elements from today's encounter can be found in the full encounter summary report (not reduplicated in this progress note). I personally obtained the chief complaint(s) and history of present illness.  I confirmed and edited as necessary the review of systems, past medical/surgical history, family history, social history, and examination findings as documented by others; and I examined the patient myself. I personally reviewed the relevant tests, images, and reports as documented above. I formulated and edited as necessary the assessment and plan and discussed the findings and management plan with the patient and family. - Jessi Chapman MD

## 2017-06-13 NOTE — NURSING NOTE
Chief Complaints and History of Present Illnesses   Patient presents with     Post Op (Ophthalmology) Left Eye     Right lower eyelid retraction repair with conjunctivoplasty, alloderm graft, lateral canthopexy and temporary Blunt suture tarsorrhaphy.     HPI    Affected eye(s):  Right   Symptoms:     No blurred vision   Itching   Burning      Frequency:  Constant       Do you have eye pain now?:  No      Comments:  5 day postop s/p Right lower eyelid retraction repair with conjunctivoplasty, alloderm graft, lateral canthopexy and temporary Blunt suture tarsorrhaphy on 6/8/2017. Pt states having some burning and itching. Stitches were removed yesterday at Dr. Gonzalez's appt. Restasis and PFAT OU.    Anali Graham COT 11:17 AM June 13, 2017

## 2017-06-13 NOTE — Clinical Note
Harsha Ewing, I think lower lids are in good position. Mother and patient really want upper eyelids done, I said only after ocular surface is improved. I will see her back anytime you would like me to. Thanks!

## 2017-06-13 NOTE — MR AVS SNAPSHOT
After Visit Summary   2017    Chloe Cleaning    MRN: [id number]           Patient Information     Date Of Birth          1995        Visit Information        Provider Department      2017 10:45 AM Jessi Chapman MD; [facility]        Today's Diagnoses     Limbal stem cell deficiency, bilateral - Both Eyes    -  1    Keratitis - Both Eyes           Follow-ups after your visit        Your next 10 appointments already scheduled     Aug 30, 2017 10:00 AM CDT   RETURN CORNEA with Rey Gonzalez MD   Eye Clinic ([facility])    [address]  [address]  [address]  [facility] [address]   [phone]              Who to contact     Please call your clinic at [phone] to:    Ask questions about your health    Make or cancel appointments    Discuss your medicines    Learn about your test results    Speak to your doctor   If you have compliments or concerns about an experience at your clinic, or if you wish to file a complaint, please contact [facility] Patient Relations at [phone] or email us at [email or web address]         Additional Information About Your Visit        MyChart Information     Astechhart is an electronic gateway that provides easy, online access to your medical records. With Billowby, you can request a clinic appointment, read your test results, renew a prescription or communicate with your care team.     To sign up for [employer]ot visit the website at [email or web address]   You will be asked to enter the access code listed below, as well as some personal information. Please follow the directions to create your username and password.     Your access code is: [email or web address]  Expires: 2017  8:30 AM     Your access code will  in 90 days. If you need help or a new code, please contact your [facility] or call  [phone] for assistance.        Care EveryWhere ID     This is your Care EveryWhere ID. This could be used by other organizations to access your [place] medical records  [id number]         Blood Pressure from Last 3 Encounters:   06/08/17 121/75   05/11/17 107/69   05/08/17 112/79    Weight from Last 3 Encounters:   06/08/17 68 kg (150 lb)   05/11/17 68 kg (150 lb)   05/08/17 67 kg (147 lb 12.8 oz)              Today, you had the following     No orders found for display       Primary Care Provider    Physician No Ref-Primary       No address on file        Thank you!     Thank you for choosing [facility] OPHTHALMOLOG[facility]  for your care. Our goal is always to provide you with excellent care. Hearing back from our patients is one way we can continue to improve our services. Please take a few minutes to complete the written survey that you may receive in the mail after your visit with us. Thank you!             Your Updated Medication List - Protect others around you: Learn how to safely use, store and throw away your medicines at www.disposemymeds.org.          This list is accurate as of: 6/13/17 11:45 AM.  Always use your most recent med list.                   Brand Name Dispense Instructions for use    * carboxymethylcellulose 1 % ophthalmic solution    CELLUVISC/REFRESH LIQUIGEL    90 each    Place 1 drop into both eyes 4 times daily Dispose of dropperette within 24 hours after opening or if the tip is contaminated.       * carboxymethylcellulose 1 % ophthalmic solution    CELLUVISC/REFRESH LIQUIGEL    90 each    Place 1 drop into both eyes every hour (while awake) Dispose of dropperette within 24 hours after opening or if the tip is contaminated.       * carboxymethylcellulose 0.5 % Soln ophthalmic solution    carboxymethylcellulose sodium    70 each    Place 1 drop into both eyes 6 times daily       cycloSPORINE 0.05 % ophthalmic emulsion    RESTASIS    3 Box    Place 1 drop into both eyes 2 times daily     "   * erythromycin ophthalmic ointment    ROMYCIN    3.5 g    Apply 1/2\" strip to left eye three times a day       * erythromycin ophthalmic ointment    ROMYCIN    3.5 g    Apply small amount to incision sites three times daily after the dressing has been removed in clinic       fluorometholone 0.1 % ophthalmic susp    FML LIQUIFILM    15 mL    Place 1 drop into both eyes 4 times daily       HYDROcodone-acetaminophen 5-325 MG per tablet    NORCO    15 tablet    Take 1 tablet by mouth every 6 hours as needed for pain Maximum of 4000 mg of acetaminophen in 24 hours.       neomycin-polymyxin-dexamethasone 3.5-16847-3.1 Susp ophthalmic susp    MAXITROL    1 Bottle    Place 1 drop into the right eye 3 times daily One drop to operated eye(s) three times daily for 10 days.       SUMAtriptan 100 MG tablet    IMITREX     at onset of headache       TYLENOL PO      Take by mouth every 4 hours as needed       * Notice:  This list has 5 medication(s) that are the same as other medications prescribed for you. Read the directions carefully, and ask your doctor or other care provider to review them with you.      "

## 2017-07-14 ENCOUNTER — TELEPHONE (OUTPATIENT)
Dept: OPHTHALMOLOGY | Facility: CLINIC | Age: 22
End: 2017-07-14

## 2017-07-14 NOTE — TELEPHONE ENCOUNTER
Pt at [facility] neurology clinic now.  Requesting notes from eye clinic  Clinic faxed consent and most recent notes faxed  Tez Bello RN 10:29 AM 07/14/17

## 2017-08-18 ENCOUNTER — OFFICE VISIT (OUTPATIENT)
Dept: OPHTHALMOLOGY | Facility: CLINIC | Age: 22
End: 2017-08-18
Attending: OPHTHALMOLOGY
Payer: COMMERCIAL

## 2017-08-18 ENCOUNTER — TELEPHONE (OUTPATIENT)
Dept: OPHTHALMOLOGY | Facility: CLINIC | Age: 22
End: 2017-08-18

## 2017-08-18 DIAGNOSIS — L03.213 PRESEPTAL CELLULITIS OF RIGHT EYE: Primary | ICD-10-CM

## 2017-08-18 PROCEDURE — 99212 OFFICE O/P EST SF 10 MIN: CPT | Mod: ZF

## 2017-08-18 PROCEDURE — 92285 EXTERNAL OCULAR PHOTOGRAPHY: CPT | Mod: ZF | Performed by: OPHTHALMOLOGY

## 2017-08-18 RX ORDER — CEPHALEXIN 500 MG/1
500 CAPSULE ORAL 3 TIMES DAILY
Qty: 20 CAPSULE | Refills: 0 | Status: SHIPPED | OUTPATIENT
Start: 2017-08-18 | End: 2018-05-30

## 2017-08-18 ASSESSMENT — CUP TO DISC RATIO: OD_RATIO: 0.3

## 2017-08-18 ASSESSMENT — TONOMETRY
OS_IOP_MMHG: 12
IOP_METHOD: ICARE
OD_IOP_MMHG: 20

## 2017-08-18 ASSESSMENT — VISUAL ACUITY
OD_SC: 20/100
OD_PH_SC: 20/80
OS_SC: 20/40
METHOD: SNELLEN - LINEAR
OS_PH_SC: 20/40

## 2017-08-18 NOTE — NURSING NOTE
Chief Complaints and History of Present Illnesses   Patient presents with     Post Op (Ophthalmology) Right Eye     s/p swelling RE     HPI    Affected eye(s):  Right   Symptoms:     No blurred vision   Redness   No foreign body sensation   Tearing   Burning   Photophobia   Eye discharge         Do you have eye pain now?:  Yes   Location:  OD   Pain Level:  Worst Pain (10)   Pain Duration:  3 days   Pain Frequency:  Constant   Pain Characteristics:  Stabbing, Aching, Burning      Comments:  Pt. stated RE pain over the last 3 days.  Chinmay Hayden  2:26 PM August 18, 2017

## 2017-08-18 NOTE — TELEPHONE ENCOUNTER
----- Message from Tez Bello RN sent at 8/18/2017 10:51 AM CDT -----  Regarding: FW: Pt's sister, Laura, requesting that the pt be seen asap...  Contact: [phone]      ----- Message -----     From: Ranjana Mina     Sent: 8/18/2017   9:49 AM       To: Eye Triage-Ump  Subject: Pt's sister, Laura, requesting that the pt#    Pt's eye that had surgery is swollen, very big, something is wrong.    Please call Laura at [phone].    Thank you,  Sg NARANJO    Please DO NOT send this message and/or reply back to sender.  Call Center Representatives DO NOT respond to messages.

## 2017-08-18 NOTE — PROGRESS NOTES
CC: right eyelid swelling    HPI: 21 year old [place] female s/p eyelid surgery OD 6/13/17 who has noticed pain, tenderness, and swelling for the past 3 days.  She has noticed redness, decreased vision, and pain with eye movements.  Denies history of allergies or sinus problems.  Denies fever, chills, sweats, nausea, vomiting, or any other change in systemic symptoms.      Previously using:  PFATs QID OU  Restasis BID OU    Assessment and Plan:    1. Orbital Cellulitis vs Pre-septal cellulitis OD vs Suture related granuloma   - 2 months s/p eyelid surgery; may be related but unlikely given the timing   - denies sinus history or symptoms; no systemic symptoms   - initiate systemic ABx   - Recommend CT orbit if no improvement   - Oculoplastics will follow up    2. Limbal Stem Cell Deficiency, both eyes. Baseline slit lamp photos 11/2015.   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 Mac   - restart Preservative free artificial tears Q1H OU   - continue Restasis OU twice a day    - minimize all ocular surface toxicity    F/u Tuesday with Dr. Bowen  2-3 weeks with Dr. Gonzalez    Complete documentation of historical and exam elements from today's encounter can be found in the full encounter summary report (not reduplicated in this progress note). I personally obtained the chief complaint(s) and history of present illness.  I confirmed and edited as necessary the review of systems, past medical/surgical history, family history, social history, and examination findings as documented by others; and I examined the patient myself. I personally reviewed the relevant tests, images, and reports as documented above. I formulated and edited as necessary the assessment and plan and discussed  the findings and management plan with the patient and family.     Justin Stout, DO

## 2017-08-18 NOTE — TELEPHONE ENCOUNTER
I returned call to [facility] regarding Chloe.  I asked if someone was available to send us a picture but she said she was not at the same house. She was unable to provide detailed information as she was not with the patient.  No redness or discharge per Laura, she couldn't tell if her vision was good or not because the eyelid is swollen such.  Laura did say that hCloe's father could provide her a ride to clinic.  Given the lack of information and concern for infection, Dr. Lal suggested the patient come in today and see the on call fellow or resident.  The oculoplastic staff will be available at [facility] as well if needed.  Tez REYES in triage will call and make the arrangements with [facility].  Rukhsana Foreman RN 11:15 AM 08/18/17

## 2017-08-18 NOTE — MR AVS SNAPSHOT
After Visit Summary   8/18/2017    Chloe Cleaning    MRN: [id number]           Patient Information     Date Of Birth          1995        Visit Information        Provider Department      8/18/2017 1:45 PM Open, Assignments; Justin Stout,  Eye Clinic        Today's Diagnoses     Preseptal cellulitis of right eye - Right Eye    -  1       Follow-ups after your visit        Follow-up notes from your care team     Return in about 2 weeks (around 9/1/2017) for With Dr. Gonzalez.      Your next 10 appointments already scheduled     Aug 22, 2017 10:15 AM CDT   (Arrive by 10:00 AM)   RETURN PLASTICS with Jessi Chapman MD   [facility]hthalmology ([facility])    [address]  4th Floor  [place] [address] [phone]            Sep 15, 2017  2:45 PM CDT   RETURN CORNEA with Rey Gonzalez MD   Eye Clinic ([facility])    [facility]  [address]  [address] [address]  [place] [address] [phone]            Oct 20, 2017  1:30 PM CDT   Office Visit with Dale Simmons MD   [facility] ([facility])    [address] [address] [phone]           Bring a current list of meds and any records pertaining to this visit. For Physicals, please bring immunization records and any forms needing to be filled out. Please arrive 10 minutes early to complete paperwork.              Who to contact     Please call your clinic at [phone] to:    Ask questions about your health    Make or cancel appointments    Discuss your medicines    Learn about your test results    Speak to your doctor   If you have compliments or concerns about an experience at your clinic, or if you wish to file a complaint, please contact [facility] Patient Relations at [phone] or email us at [email or web address]         Additional Information  About Your Visit        [facility] Information     [facility] [employer] is an electronic gateway that provides easy, online access to your medical records. With [facility], you can request a clinic appointment, read your test results, renew a prescription or communicate with your care team.     To sign up for [facility] visit the website at [email or web address]   You will be asked to enter the access code listed below, as well as some personal information. Please follow the directions to create your username and password.     Your access code is: [email or web address]  Expires: 2017  6:31 AM     Your access code will  in 90 days. If you need help or a new code, please contact your [facility] or call [phone] for assistance.        Care EveryWhere ID     This is your Care EveryWhere ID. This could be used by other organizations to access your [place] medical records  [id number]         Blood Pressure from Last 3 Encounters:   17 121/75   17 107/69   17 112/79    Weight from Last 3 Encounters:   17 68 kg (150 lb)   17 68 kg (150 lb)   17 67 kg (147 lb 12.8 oz)              We Performed the Following     Slit Lamp Photos OU (both eyes)          Today's Medication Changes          These changes are accurate as of: 17  5:05 PM.  If you have any questions, ask your nurse or doctor.               Start taking these medicines.        Dose/Directions    cephALEXin 500 MG capsule   Commonly known as:  KEFLEX   Used for:  Preseptal cellulitis of right eye   Started by:  Justin Stout,         Dose:  500 mg   Take 1 capsule (500 mg) by mouth 3 times daily   Quantity:  20 capsule   Refills:  0            Where to get your medicines      These medications were sent to [facility] [address] [place] [address] [address] [facility]  [address] [address]     Phone:  [phone]     cephALEXin 500 MG capsule                Primary  Care Provider    Physician No Ref-Primary       No address on file        Equal Access to Services     DYLON LOU : Hadii aad marissa ramona Bloom, shelby katiuskacaesar, nae daniel marytyrone, eliane billin hayaarobert hernandezdeon valdiviajr haile. So [facility] [phone].    ATENCIÓN: Si habla español, tiene a de la cruz disposición servicios gratuitos de asistencia lingüística. Llame al [phone].    We comply with applicable federal civil rights laws and [place] laws. We do not discriminate on the basis of race, color, national origin, age, disability sex, sexual orientation or gender identity.            Thank you!     Thank you for choosing EYE CLINIC  for your care. Our goal is always to provide you with excellent care. Hearing back from our patients is one way we can continue to improve our services. Please take a few minutes to complete the written survey that you may receive in the mail after your visit with us. Thank you!             Your Updated Medication List - Protect others around you: Learn how to safely use, store and throw away your medicines at www.disposemymeds.org.          This list is accurate as of: 8/18/17  5:05 PM.  Always use your most recent med list.                   Brand Name Dispense Instructions for use Diagnosis    * carboxymethylcellulose 1 % ophthalmic solution    CELLUVISC/REFRESH LIQUIGEL    90 each    Place 1 drop into both eyes 4 times daily Dispose of dropperette within 24 hours after opening or if the tip is contaminated.    Dry eyes, bilateral, Limbal stem cell deficiency, bilateral, Pannus (corneal), bilateral       * carboxymethylcellulose 1 % ophthalmic solution    CELLUVISC/REFRESH LIQUIGEL    90 each    Place 1 drop into both eyes every hour (while awake) Dispose of dropperette within 24 hours after opening or if the tip is contaminated.    Limbal stem cell deficiency, bilateral, Dry eyes, bilateral       * carboxymethylcellulose 0.5 % Soln ophthalmic solution    carboxymethylcellulose  "sodium    70 each    Place 1 drop into both eyes 6 times daily    Eyelid retraction or lag, Limbal stem cell deficiency, bilateral       cephALEXin 500 MG capsule    KEFLEX    20 capsule    Take 1 capsule (500 mg) by mouth 3 times daily    Preseptal cellulitis of right eye       cycloSPORINE 0.05 % ophthalmic emulsion    RESTASIS    3 Box    Place 1 drop into both eyes 2 times daily    Dry eyes, bilateral, Limbal stem cell deficiency, bilateral       * erythromycin ophthalmic ointment    ROMYCIN    3.5 g    Apply 1/2\" strip to left eye three times a day    Postoperative eye state       * erythromycin ophthalmic ointment    ROMYCIN    3.5 g    Apply small amount to incision sites three times daily after the dressing has been removed in clinic    Postoperative eye state       fluorometholone 0.1 % ophthalmic susp    FML LIQUIFILM    15 mL    Place 1 drop into both eyes 4 times daily    Keratitis       HYDROcodone-acetaminophen 5-325 MG per tablet    NORCO    15 tablet    Take 1 tablet by mouth every 6 hours as needed for pain Maximum of 4000 mg of acetaminophen in 24 hours.    Postoperative eye state       neomycin-polymyxin-dexamethasone 3.5-64207-9.1 Susp ophthalmic susp    MAXITROL    1 Bottle    Place 1 drop into the right eye 3 times daily One drop to operated eye(s) three times daily for 10 days.    Postoperative eye state       SUMAtriptan 100 MG tablet    IMITREX     at onset of headache    Throat pain       TYLENOL PO      Take by mouth every 4 hours as needed        * Notice:  This list has 5 medication(s) that are the same as other medications prescribed for you. Read the directions carefully, and ask your doctor or other care provider to review them with you.      "

## 2017-08-22 ENCOUNTER — OFFICE VISIT (OUTPATIENT)
Dept: OPHTHALMOLOGY | Facility: CLINIC | Age: 22
End: 2017-08-22

## 2017-08-22 DIAGNOSIS — H18.893 LIMBAL STEM CELL DEFICIENCY, BILATERAL: ICD-10-CM

## 2017-08-22 DIAGNOSIS — H04.123 DRY EYES, BILATERAL: ICD-10-CM

## 2017-08-22 DIAGNOSIS — H11.133: ICD-10-CM

## 2017-08-22 DIAGNOSIS — Z98.890 POSTOPERATIVE EYE STATE: Primary | ICD-10-CM

## 2017-08-22 ASSESSMENT — TONOMETRY
OD_IOP_MMHG: 12
IOP_METHOD: ICARE
OS_IOP_MMHG: 16

## 2017-08-22 ASSESSMENT — VISUAL ACUITY
OS_SC: 20/40-1
METHOD: SNELLEN - LINEAR
OD_SC: 20/125
OD_PH_SC: 20/70
OS_PH_SC: 20/30

## 2017-08-22 NOTE — MR AVS SNAPSHOT
After Visit Summary   8/22/2017    Chloe Cleaning    MRN: [id number]           Patient Information     Date Of Birth          1995        Visit Information        Provider Department      8/22/2017 10:00 AM Jessi Chapman MD; [facility] Ophthalmology        Today's Diagnoses     Postoperative eye state    -  1    Dry eyes, bilateral        Limbal stem cell deficiency, bilateral - Both Eyes        Conjunctival melanosis, bilateral - Both Eyes           Follow-ups after your visit        Your next 10 appointments already scheduled     Sep 15, 2017  2:45 PM CDT   RETURN CORNEA with Rey Gonzalez MD   Eye Clinic ([facility])    [address]  [address]  [address] [address]  [facility] [address] [phone]            Oct 20, 2017  1:30 PM CDT   Office Visit with Dale Simmons MD   [facility]    [address] [address] [phone]           Bring a current list of meds and any records pertaining to this visit. For Physicals, please bring immunization records and any forms needing to be filled out. Please arrive 10 minutes early to complete paperwork.              Who to contact     Please call your clinic at [phone] to:    Ask questions about your health    Make or cancel appointments    Discuss your medicines    Learn about your test results    Speak to your doctor   If you have compliments or concerns about an experience at your clinic, or if you wish to file a complaint, please contact [facility] Patient Relations at [phone] or email us at [email or web address]         Additional Information About Your Visit        MyChart Information     [employer] is an electronic gateway that provides easy, online access to your medical records. With [employer], you can request a clinic appointment, read your test  results, renew a prescription or communicate with your care team.     To sign up for [email or web address] visit the website at [email or web address]   You will be asked to enter the access code listed below, as well as some personal information. Please follow the directions to create your username and password.     Your access code is: [email or web address]  Expires: 2017  6:31 AM     Your access code will  in 90 days. If you need help or a new code, please contact your [facility] or call [phone] for assistance.        Care EveryWhere ID     This is your Care EveryWhere ID. This could be used by other organizations to access your [place] medical records  [id number]         Blood Pressure from Last 3 Encounters:   17 121/75   17 107/69   17 112/79    Weight from Last 3 Encounters:   17 68 kg (150 lb)   17 68 kg (150 lb)   17 67 kg (147 lb 12.8 oz)              Today, you had the following     No orders found for display       Primary Care Provider    Physician No Ref-Primary       No address on file        Equal Access to Services     [facility] : Hadii aad marissa anguloo Merle, waaxda lunadyaadaha, qaybta kaalmada adeegyada, eliane de jesus . So [facility] [phone].    ATENCIÓN: Si habla español, tiene a de la cruz disposición servicios gratuitos de asistencia lingüística. Llame al [phone].    We comply with applicable federal civil rights laws and [place] laws. We do not discriminate on the basis of race, color, national origin, age, disability sex, sexual orientation or gender identity.            Thank you!     Thank you for choosing [facility]  for your care. Our goal is always to provide you with excellent care. Hearing back from our patients is one way we can continue to improve our services. Please take a few minutes to complete the written survey that you may receive in the mail after your visit with us.  "Thank you!             Your Updated Medication List - Protect others around you: Learn how to safely use, store and throw away your medicines at www.disposemymeds.org.          This list is accurate as of: 8/22/17 12:14 PM.  Always use your most recent med list.                   Brand Name Dispense Instructions for use Diagnosis    * carboxymethylcellulose 1 % ophthalmic solution    CELLUVISC/REFRESH LIQUIGEL    90 each    Place 1 drop into both eyes 4 times daily Dispose of dropperette within 24 hours after opening or if the tip is contaminated.    Dry eyes, bilateral, Limbal stem cell deficiency, bilateral, Pannus (corneal), bilateral       * carboxymethylcellulose 1 % ophthalmic solution    CELLUVISC/REFRESH LIQUIGEL    90 each    Place 1 drop into both eyes every hour (while awake) Dispose of dropperette within 24 hours after opening or if the tip is contaminated.    Limbal stem cell deficiency, bilateral, Dry eyes, bilateral       * carboxymethylcellulose 0.5 % Soln ophthalmic solution    carboxymethylcellulose sodium    70 each    Place 1 drop into both eyes 6 times daily    Eyelid retraction or lag, Limbal stem cell deficiency, bilateral       cephALEXin 500 MG capsule    KEFLEX    20 capsule    Take 1 capsule (500 mg) by mouth 3 times daily    Preseptal cellulitis of right eye       cycloSPORINE 0.05 % ophthalmic emulsion    RESTASIS    3 Box    Place 1 drop into both eyes 2 times daily    Dry eyes, bilateral, Limbal stem cell deficiency, bilateral       * erythromycin ophthalmic ointment    ROMYCIN    3.5 g    Apply 1/2\" strip to left eye three times a day    Postoperative eye state       * erythromycin ophthalmic ointment    ROMYCIN    3.5 g    Apply small amount to incision sites three times daily after the dressing has been removed in clinic    Postoperative eye state       fluorometholone 0.1 % ophthalmic susp    FML LIQUIFILM    15 mL    Place 1 drop into both eyes 4 times daily    Keratitis       " HYDROcodone-acetaminophen 5-325 MG per tablet    NORCO    15 tablet    Take 1 tablet by mouth every 6 hours as needed for pain Maximum of 4000 mg of acetaminophen in 24 hours.    Postoperative eye state       neomycin-polymyxin-dexamethasone 3.5-00173-5.1 Susp ophthalmic susp    MAXITROL    1 Bottle    Place 1 drop into the right eye 3 times daily One drop to operated eye(s) three times daily for 10 days.    Postoperative eye state       SUMAtriptan 100 MG tablet    IMITREX     at onset of headache    Throat pain       TYLENOL PO      Take by mouth every 4 hours as needed        * Notice:  This list has 5 medication(s) that are the same as other medications prescribed for you. Read the directions carefully, and ask your doctor or other care provider to review them with you.

## 2017-08-22 NOTE — PROGRESS NOTES
Chief Complaints and History of Present Illnesses   Patient presents with     Post Op (Ophthalmology) Both Eyes     Orbital Cellulitis vs Pre-septal cellulitis OD vs Suture related granuloma     POM2 s/p both lower eyelid retraction repair. She has significant upper eyelid ptosis. She complains of swelling OD 1 week ago and was given an oral antibiotic to take three times a day. Since then she has pain and swelling OD with purulent discharge from the eye. Today she states she has more burning in the eye rather than pain and there is not much swelling or discharge. She denies any congestion, sinus pressure, fevers, or chills. She saw Dr. Gonzalez 4 days ago who felt the differential included orbital vs. Pre-septal cellulitis vs. Suture related granuloma. She was started on systemic antibiotics. Her limbal stem cell deficiency OU was also noted to be worsened (OD > OS) and she was started on aggressive lubrication.       Assessment & Plan     Chloe Cleaning is a 22 year old female with the following diagnoses:   1. Postoperative eye state    2. Dry eyes, bilateral    3. Limbal stem cell deficiency, bilateral - Both Eyes    4. Conjunctival melanosis, bilateral - Both Eyes       Continue aggressive AT lubrication per Dr. Gonzalez  Swelling and redness were not visualized a few days ago but they seem improved, minimal swelling and no periorbital erythema. Continue systemic abx  Lower eyelids are in good position  Still with significant upper eyelid ptosis, which they are anxious to have addressed. I recommend not addressing until after ocular surface is in better condition.  She has f/u with Dr. Gonzalez. Can f/u with me in several months if decide to proceed with both upper eyelid ptosis repair. She is ok with external approach in terms of cooperation.     Theo Perry M.D.  PGY-2           Complete documentation of historical and exam elements from today's encounter can be found in the full encounter summary report (not reduplicated  in this progress note). I personally obtained the chief complaint(s) and history of present illness.  I confirmed and edited as necessary the review of systems, past medical/surgical history, family history, social history, and examination findings as documented by others; and I examined the patient myself. I personally reviewed the relevant tests, images, and reports as documented above. I formulated and edited as necessary the assessment and plan and discussed the findings and management plan with the patient and family. - Jessi Chapman MD

## 2017-08-22 NOTE — NURSING NOTE
Chief Complaints and History of Present Illnesses   Patient presents with     Post Op (Ophthalmology) Both Eyes     Orbital Cellulitis vs Pre-septal cellulitis OD vs Suture related granuloma     HPI    Affected eye(s):  Both   Symptoms:     Dryness   Burning         Do you have eye pain now?:  No      Comments:  Feeling better, no pain now but BE still burn. Using AT  hr     Sahara MELENDEZ August 22, 2017 10:30 AM

## 2017-08-22 NOTE — LETTER
August 22, 2017      Re: Chloe Cleaning   1995    To Whom It May Concern:    Please excuse patient from work from days 8/17/2017- 8/22/2017.  Chloe Cleaning is able to return to work tomorrow 8/23/2017.    Thank you for your cooperation in this matter.  Please do not hesitate to contact me if you have any further questions.    Sincerely,         BHARTI ROBERTS  [employer]

## 2017-09-20 ENCOUNTER — OFFICE VISIT (OUTPATIENT)
Dept: OPHTHALMOLOGY | Facility: CLINIC | Age: 22
End: 2017-09-20
Attending: OPHTHALMOLOGY
Payer: COMMERCIAL

## 2017-09-20 DIAGNOSIS — H18.893 LIMBAL STEM CELL DEFICIENCY, BILATERAL: Primary | ICD-10-CM

## 2017-09-20 DIAGNOSIS — S05.00XA ABRASION OF CONJUNCTIVA, UNSPECIFIED LATERALITY, INITIAL ENCOUNTER: ICD-10-CM

## 2017-09-20 PROCEDURE — 99213 OFFICE O/P EST LOW 20 MIN: CPT | Mod: ZF

## 2017-09-20 RX ORDER — PREDNISOLONE ACETATE 10 MG/ML
1 SUSPENSION/ DROPS OPHTHALMIC 2 TIMES DAILY
Qty: 5 ML | Refills: 0 | Status: SHIPPED | OUTPATIENT
Start: 2017-09-20 | End: 2018-02-21

## 2017-09-20 RX ORDER — OFLOXACIN 3 MG/ML
1 SOLUTION/ DROPS OPHTHALMIC 2 TIMES DAILY
Qty: 1 BOTTLE | Refills: 11 | Status: SHIPPED | OUTPATIENT
Start: 2017-09-20 | End: 2017-10-13

## 2017-09-20 ASSESSMENT — VISUAL ACUITY
OD_SC: 20/300
OD_PH_SC: 20/150
OS_SC: 20/50
METHOD: SNELLEN - LINEAR

## 2017-09-20 ASSESSMENT — CONF VISUAL FIELD
OS_INFERIOR_TEMPORAL_RESTRICTION: 3
OD_INFERIOR_NASAL_RESTRICTION: 3
OD_SUPERIOR_TEMPORAL_RESTRICTION: 3
OD_SUPERIOR_NASAL_RESTRICTION: 3
OS_INFERIOR_NASAL_RESTRICTION: 3
OD_INFERIOR_TEMPORAL_RESTRICTION: 3
METHOD: COUNTING FINGERS
OS_SUPERIOR_NASAL_RESTRICTION: 3
OS_SUPERIOR_TEMPORAL_RESTRICTION: 3

## 2017-09-20 ASSESSMENT — TONOMETRY
OD_IOP_MMHG: 09
IOP_METHOD: ICARE
OS_IOP_MMHG: 10

## 2017-09-20 NOTE — MR AVS SNAPSHOT
After Visit Summary   9/20/2017    Chloe Cleaning    MRN: [id number]           Patient Information     Date Of Birth          1995        Visit Information        Provider Department      9/20/2017 7:30 AM Rey Gonzalez MD; [facility] Eye Clinic        Today's Diagnoses     Limbal stem cell deficiency, bilateral - Both Eyes    -  1    Abrasion of conjunctiva, unspecified laterality, initial encounter           Follow-ups after your visit        Follow-up notes from your care team     Return in about 4 weeks (around 10/18/2017).      Your next 10 appointments already scheduled     Oct 04, 2017  9:00 AM CDT   RETURN CORNEA with Rey Gonzalez MD   Eye Clinic ([facility])    [facility]  [address] [facility]  [address] [address] [phone]            Oct 20, 2017  1:30 PM CDT   [facility]    [address] [address] [phone]           Bring a current list of meds and any records pertaining to this visit. For Physicals, please bring immunization records and any forms needing to be filled out. Please arrive 10 minutes early to complete paperwork.              Who to contact     Please call your clinic at [phone] to:    Ask questions about your health    Make or cancel appointments    Discuss your medicines    Learn about your test results    Speak to your doctor   If you have compliments or concerns about an experience at your clinic, or if you wish to file a complaint, please contact [facility] Patient Relations at [phone] or email us at [email or web address]         Additional Information About Your Visit        MyChart Information     [employer] is an electronic gateway that provides easy, online access to your medical records. With [employer], you can request a  clinic appointment, read your test results, renew a prescription or communicate with your care team.     To sign up for [employer] visit the website at [email or web address]   You will be asked to enter the access code listed below, as well as some personal information. Please follow the directions to create your username and password.     Your access code is: [email or web address]  Expires: 2017  6:31 AM     Your access code will  in 90 days. If you need help or a new code, please contact your [facility] or call [phone] for assistance.        Care EveryWhere ID     This is your Care EveryWhere ID. This could be used by other organizations to access your [place] medical records  [id number]         Blood Pressure from Last 3 Encounters:   17 121/75   17 107/69   17 112/79    Weight from Last 3 Encounters:   17 68 kg (150 lb)   17 68 kg (150 lb)   17 67 kg (147 lb 12.8 oz)              Today, you had the following     No orders found for display         Today's Medication Changes          These changes are accurate as of: 17  9:02 AM.  If you have any questions, ask your nurse or doctor.               Start taking these medicines.        Dose/Directions    ofloxacin 0.3 % ophthalmic solution   Commonly known as:  OCUFLOX   Used for:  Limbal stem cell deficiency, bilateral, Abrasion of conjunctiva, unspecified laterality, initial encounter   Started by:  Rey Gonzalez MD        Dose:  1 drop   Place 1 drop into both eyes 2 times daily   Quantity:  1 Bottle   Refills:  11       prednisoLONE acetate 1 % ophthalmic susp   Commonly known as:  PRED FORTE   Used for:  Limbal stem cell deficiency, bilateral, Abrasion of conjunctiva, unspecified laterality, initial encounter   Started by:  Rey Gonzalez MD        Dose:  1 drop   Place 1 drop into both eyes 2 times daily   Quantity:  5 mL   Refills:  0            Where to get your  medicines      These medications were sent to [facility] [address] [place] [address] [address] [facility]  [address] [facility] [address]     Phone:  [phone]     ofloxacin 0.3 % ophthalmic solution    prednisoLONE acetate 1 % ophthalmic susp                Primary Care Provider    Physician No Ref-Primary       No address on file        Equal Access to Services     DYLON LOU : Hadii aad ku hadasho Soomaali, waaxda luqadaha, qaybta kaalmada adeegyada, waxmaria fernanda billin haydarnelln adedeon nogueira neal haile. So [facility] [phone].    ATENCIÓN: Si habla español, tiene a de la cruz disposición servicios gratuitos de asistencia lingüística. [facility] [phone].    We comply with applicable federal civil rights laws and [place] laws. We do not discriminate on the basis of race, color, national origin, age, disability sex, sexual orientation or gender identity.            Thank you!     Thank you for choosing EYE CLINIC  for your care. Our goal is always to provide you with excellent care. Hearing back from our patients is one way we can continue to improve our services. Please take a few minutes to complete the written survey that you may receive in the mail after your visit with us. Thank you!             Your Updated Medication List - Protect others around you: Learn how to safely use, store and throw away your medicines at www.disposemymeds.org.          This list is accurate as of: 9/20/17  9:02 AM.  Always use your most recent med list.                   Brand Name Dispense Instructions for use Diagnosis    * carboxymethylcellulose 1 % ophthalmic solution    CELLUVISC/REFRESH LIQUIGEL    90 each    Place 1 drop into both eyes 4 times daily Dispose of dropperette within 24 hours after opening or if the tip is contaminated.    Dry eyes, bilateral, Limbal stem cell deficiency, bilateral, Pannus (corneal), bilateral       * carboxymethylcellulose 1 % ophthalmic solution    CELLUVISC/REFRESH LIQUIGEL    90 each  "   Place 1 drop into both eyes every hour (while awake) Dispose of dropperette within 24 hours after opening or if the tip is contaminated.    Limbal stem cell deficiency, bilateral, Dry eyes, bilateral       * carboxymethylcellulose 0.5 % Soln ophthalmic solution    carboxymethylcellulose sodium    70 each    Place 1 drop into both eyes 6 times daily    Eyelid retraction or lag, Limbal stem cell deficiency, bilateral       cephALEXin 500 MG capsule    KEFLEX    20 capsule    Take 1 capsule (500 mg) by mouth 3 times daily    Preseptal cellulitis of right eye       cycloSPORINE 0.05 % ophthalmic emulsion    RESTASIS    3 Box    Place 1 drop into both eyes 2 times daily    Dry eyes, bilateral, Limbal stem cell deficiency, bilateral       * erythromycin ophthalmic ointment    ROMYCIN    3.5 g    Apply 1/2\" strip to left eye three times a day    Postoperative eye state       * erythromycin ophthalmic ointment    ROMYCIN    3.5 g    Apply small amount to incision sites three times daily after the dressing has been removed in clinic    Postoperative eye state       fluorometholone 0.1 % ophthalmic susp    FML LIQUIFILM    15 mL    Place 1 drop into both eyes 4 times daily    Keratitis       HYDROcodone-acetaminophen 5-325 MG per tablet    NORCO    15 tablet    Take 1 tablet by mouth every 6 hours as needed for pain Maximum of 4000 mg of acetaminophen in 24 hours.    Postoperative eye state       neomycin-polymyxin-dexamethasone 3.5-54728-4.1 Susp ophthalmic susp    MAXITROL    1 Bottle    Place 1 drop into the right eye 3 times daily One drop to operated eye(s) three times daily for 10 days.    Postoperative eye state       ofloxacin 0.3 % ophthalmic solution    OCUFLOX    1 Bottle    Place 1 drop into both eyes 2 times daily    Limbal stem cell deficiency, bilateral, Abrasion of conjunctiva, unspecified laterality, initial encounter       prednisoLONE acetate 1 % ophthalmic susp    PRED FORTE    5 mL    Place 1 drop into " both eyes 2 times daily    Limbal stem cell deficiency, bilateral, Abrasion of conjunctiva, unspecified laterality, initial encounter       SUMAtriptan 100 MG tablet    IMITREX     at onset of headache    Throat pain       TYLENOL PO      Take by mouth every 4 hours as needed        * Notice:  This list has 5 medication(s) that are the same as other medications prescribed for you. Read the directions carefully, and ask your doctor or other care provider to review them with you.

## 2017-09-20 NOTE — NURSING NOTE
Chief Complaints and History of Present Illnesses   Patient presents with     Follow Up For     HPI    Affected eye(s):  Both   Symptoms:        Frequency:  Constant       Do you have eye pain now?:  No      Comments:  States that the va is good  Has had some redness and itchy  Pat Guerrero COT 7:50 AM September 20, 2017

## 2017-09-20 NOTE — PROGRESS NOTES
CC: right eyelid swelling    HPI: 21 year old [place] female s/p eyelid surgery OD 6/13/17 here for f/u of limbal stem cell deficiency. She notes that her vision has gotten worse since her last visit in the right eye. Reports watering of both eyes over the past week. She notes that sometimes her drops help with her irritation, but at other times they do not.     Previously using:  PFATs QID OU  Restasis BID OU    Assessment and Plan:    1. 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 Limbal Stem Cell Deficiency, both eyes. Baseline slit lamp photos 11/2015.   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 Mac   - restart Preservative free artificial tears Q1H OU   - continue Restasis OU twice a day    - minimize all ocular surface toxicity   - start ofloxacin BID OU   - start prednisolone BID OU   - place BCL OU   - patient may ultimately need limbal stem cell transplant OD, could consider SLET with systemic immunosuppression    RTC 1-2 weeks    Keyshawn Vivar MD  Ophthalmology, PGY-3    ~~~~~~~~~~~~~~~~~~~~~~~~~~~~~~~~~~~~~~~~~~~~~~~~~~~~~~~~~~~~~~~~    Complete documentation of historical and exam elements from today's encounter can be found in the full encounter summary report (not reduplicated in this progress note). I personally obtained the chief complaint(s) and history of present illness.  I confirmed and edited as necessary the review of systems, past medical/surgical history, family history, social history, and examination findings as documented  by others; and I examined the patient myself. I personally reviewed the relevant tests, images, and reports as documented above. I formulated and edited as necessary the assessment and plan and discussed the findings and management plan with the patient and family.    Rey Gonzalez MD    I personally spent great than 40min with the patient, of which >50% of the time was spent face to face with the patient, counseling and coordinating care with the patient. We discussed the complexity of her diagnosis, the need for further information prior to proceeding with yet another surgery, and the unknown prognosis for the patient at this time.    Rey Gonzalez MD

## 2017-09-21 ENCOUNTER — TELEPHONE (OUTPATIENT)
Dept: OPHTHALMOLOGY | Facility: CLINIC | Age: 22
End: 2017-09-21

## 2017-09-26 NOTE — TELEPHONE ENCOUNTER
[facility] Prior Authorization Team   Phone: [phone]  Fax: [phone]      PA Initiation    Medication: ofloxacin (OCUFLOX) 0.3 % ophthalmic solution  Insurance Company: [employer] [facility] - Phone [phone] Fax [phone]  Pharmacy Filling the Rx: [facility] [address] [place] [address]  Filling Pharmacy Phone: [phone]  Filling Pharmacy Fax:    Start Date: 9/26/2017

## 2017-10-03 DIAGNOSIS — H16.9 KERATITIS: Primary | ICD-10-CM

## 2017-10-03 RX ORDER — CIPROFLOXACIN HYDROCHLORIDE 3.5 MG/ML
SOLUTION/ DROPS TOPICAL
Qty: 1 BOTTLE | Refills: 11 | Status: SHIPPED | OUTPATIENT
Start: 2017-10-03 | End: 2018-02-21

## 2017-10-03 NOTE — TELEPHONE ENCOUNTER
PRIOR AUTHORIZATION DENIED    Medication: ofloxacin (OCUFLOX) 0.3 % ophthalmic solution-DENIED    Denial Date: 10/3/2017    Denial Rational:    PATIENT NEEDS TO TRY/FAIL  - MEDICATIONS LISTED BELOW.  IF PATIENT IS UNABLE A LETTER OF MEDICAL NECESSITY WITH CLINICAL EXPLANATION WILL BE NEEDED TO APPEAL.      Appeal Information:

## 2017-10-04 ENCOUNTER — TELEPHONE (OUTPATIENT)
Dept: OPHTHALMOLOGY | Facility: CLINIC | Age: 22
End: 2017-10-04

## 2017-10-04 NOTE — TELEPHONE ENCOUNTER
LVM with sister number on file to let her know that Dr Gonzalez is out and full for next week, so the soonest possible appt is not until mid November. The pt can schedule with a resident or general clinic if need to earlier, or in one of the fellow clinic on thursday afternoons at [facility] or B on Friday afternoons only.    Ginny Alexis COA 10:31 AM October 4, 2017

## 2017-10-04 NOTE — TELEPHONE ENCOUNTER
----- Message from Catarina Ortez sent at 10/4/2017  8:53 AM CDT -----  Regardin-2 Week F/U  Contact: [phone]  Pt's sister called to rescheduled F/U with Dr Gonzalez. She was to F/U in 1-2 Weeks but I was unable to get her in until November. She is requesting a call back to find out if this time frame is okay. Okay to leave voicemail if she doesn't answer.     Thank you!    KI    Please DO NOT send this message and/or reply back to sender.  Call Center Representatives DO NOT respond to messages.

## 2017-10-05 ENCOUNTER — TELEPHONE (OUTPATIENT)
Dept: OPHTHALMOLOGY | Facility: CLINIC | Age: 22
End: 2017-10-05

## 2017-10-05 NOTE — TELEPHONE ENCOUNTER
Left message with direct number for scheduling  Dr. Gonzalez's facilitator also left message yesterday with scheduling options  Tez Bello RN 9:32 AM 10/05/17

## 2017-10-05 NOTE — TELEPHONE ENCOUNTER
----- Message from Clare Salinas sent at 10/4/2017  5:33 PM CDT -----  Regarding: Pt needs sooner Appt with Dr Gonzalez  Contact: [phone]  Pt Sister Laura Bowen,  # [phone].    Please return sister call.    Pt needs sooner Appt with Dr Gonzalez please.    Pt cannot understand English, so please call Pt sister to schedule sooner Appt.    Thanks,  Clare in Call Center    Please DO NOT send this message and/or reply back to sender.  Call Center Representatives DO NOT respond to messages.

## 2017-10-13 DIAGNOSIS — H16.9 KERATITIS: ICD-10-CM

## 2017-10-13 RX ORDER — POLYMYXIN B SULFATE AND TRIMETHOPRIM 1; 10000 MG/ML; [USP'U]/ML
1 SOLUTION OPHTHALMIC 4 TIMES DAILY
Qty: 1 BOTTLE | Refills: 11 | Status: SHIPPED | OUTPATIENT
Start: 2017-10-13 | End: 2018-02-21

## 2017-10-13 NOTE — TELEPHONE ENCOUNTER
Reviewed with dr. Stout  Spoke to pharmacy  Ofloxacin was denied  Dr. Stout prefers polytrim over ciprofloxacin (previous order and was on hold by pharmacy)    New Rx sent to pharmacy

## 2017-10-17 ENCOUNTER — OFFICE VISIT (OUTPATIENT)
Dept: OPHTHALMOLOGY | Facility: CLINIC | Age: 22
End: 2017-10-17

## 2017-10-17 DIAGNOSIS — S05.00XA ABRASION OF CONJUNCTIVA, UNSPECIFIED LATERALITY, INITIAL ENCOUNTER: ICD-10-CM

## 2017-10-17 DIAGNOSIS — H11.133: ICD-10-CM

## 2017-10-17 DIAGNOSIS — H18.893 LIMBAL STEM CELL DEFICIENCY, BILATERAL: Primary | ICD-10-CM

## 2017-10-17 ASSESSMENT — CONF VISUAL FIELD
OS_INFERIOR_NASAL_RESTRICTION: 3
OS_INFERIOR_TEMPORAL_RESTRICTION: 3
OD_INFERIOR_NASAL_RESTRICTION: 3
OS_SUPERIOR_TEMPORAL_RESTRICTION: 3
OD_SUPERIOR_NASAL_RESTRICTION: 3
OD_SUPERIOR_TEMPORAL_RESTRICTION: 3
OS_SUPERIOR_NASAL_RESTRICTION: 3
OD_INFERIOR_TEMPORAL_RESTRICTION: 3

## 2017-10-17 ASSESSMENT — TONOMETRY
IOP_METHOD: ICARE
OD_IOP_MMHG: 09
OS_IOP_MMHG: 09

## 2017-10-17 ASSESSMENT — VISUAL ACUITY
OS_SC: 20/50
OD_SC+: -2
METHOD: SNELLEN - LINEAR
OD_SC: 20/70
OS_SC+: +2

## 2017-10-17 NOTE — PROGRESS NOTES
Chief Complaints and History of Present Illnesses   Patient presents with     Pain In Or Around Eye     Ms. Cleaning is a 22 year old woman who presents for evaluation of left eye pain.  She underwent bilateral lower lid retraction repair 6/13/17.  Most recently, she saw Dr. Gonzalez and he placed a bandage contact lens about 1 month ago. She missed her follow up with him.  She removed the bandage contact lens herself and states that the pain improved. It feels like there is something in her eye.       Assessment & Plan     Chloe Cleaning is a 22 year old female with the following diagnoses:   1. Limbal stem cell deficiency, bilateral - Both Eyes    2. Abrasion of conjunctiva, unspecified laterality, initial encounter    3. Conjunctival melanosis, bilateral - Both Eyes       Bilateral limbal stem cell deficiency. Missed appointment with Dr. Gonzalez, likely needs SLET vs. Limbal stem cell transplant in both eyes. No lid etiology for discomfort on exam today - discomfort actually resolved with removal of ctl. RV Dr. Gonzalez, here as needed.    Mari Sellers MD  Ophthalmic Plastic and Reconstructive Surgery Fellow         Was offered visit with Dr. Santizo today, she declined, only wants to see Dr. Gonzalez. Discussed she needs to return sooner if increasing redness, pain, light sensitivity.    Complete documentation of historical and exam elements from today's encounter can be found in the full encounter summary report (not reduplicated in this progress note). I personally obtained the chief complaint(s) and history of present illness.  I confirmed and edited as necessary the review of systems, past medical/surgical history, family history, social history, and examination findings as documented by others; and I examined the patient myself. I personally reviewed the relevant tests, images, and reports as documented above. I formulated and edited as necessary the assessment and plan and discussed the findings and management plan with the  patient and family. - Jessi Chapman MD

## 2017-10-17 NOTE — MR AVS SNAPSHOT
After Visit Summary   10/17/2017    Chloe Cleaning    MRN: [id number]           Patient Information     Date Of Birth          1995        Visit Information        Provider Department      10/17/2017 9:00 AM Jessi Chapman MD; [facility]hthalmology        Today's Diagnoses     Limbal stem cell deficiency, bilateral - Both Eyes    -  1    Abrasion of conjunctiva, unspecified laterality, initial encounter        Conjunctival melanosis, bilateral - Both Eyes           Follow-ups after your visit        Your next 10 appointments already scheduled     Oct 20, 2017  1:15 PM CDT   Office Visit with Dale Simmons MD   [facility]    [address] [address] [phone]           Bring a current list of meds and any records pertaining to this visit. For Physicals, please bring immunization records and any forms needing to be filled out. Please arrive 10 minutes early to complete paperwork.            Nov 20, 2017  9:00 AM CST   RETURN CORNEA with Rey Gonzalez MD   Eye Clinic ([facility])    [facility]  [address] Clin [address]  [facility] [id number] [phone]              Who to contact     Please call your clinic at [phone] to:    Ask questions about your health    Make or cancel appointments    Discuss your medicines    Learn about your test results    Speak to your doctor   If you have compliments or concerns about an experience at your clinic, or if you wish to file a complaint, please contact [facility] Patient Relations at [phone] or email us at [email or web address]         Additional Information About Your Visit        MyChart Information     [email or web address] is an electronic gateway that provides easy, online access to your medical records. With [email or web address], you can request a clinic appointment,  read your test results, renew a prescription or communicate with your care team.     To sign up for [employer] visit the website at [email or web address]   You will be asked to enter the access code listed below, as well as some personal information. Please follow the directions to create your username and password.     Your access code is: [email or web address]  Expires: 2017  6:31 AM     Your access code will  in 90 days. If you need help or a new code, please contact your [facility] or call [phone] for assistance.        Care EveryWhere ID     This is your Care EveryWhere ID. This could be used by other organizations to access your [place] medical records  [id number]         Blood Pressure from Last 3 Encounters:   17 121/75   17 107/69   17 112/79    Weight from Last 3 Encounters:   17 68 kg (150 lb)   17 68 kg (150 lb)   17 67 kg (147 lb 12.8 oz)              Today, you had the following     No orders found for display       Primary Care Provider    Physician No Ref-Primary       NO REF-PRIMARY PHYSICIAN        Equal Access to Services     [facility]: Hadii aad ku hadasho Sosheelaali, waaxda luqadaha, qaybta kaalmada adeegyada, eliane de jesus . So [facility] [phone].    ATENCIÓN: Si habla español, tiene a de la cruz disposición servicios gratuitos de asistencia lingüística. Llame al [phone].    We comply with applicable federal civil rights laws and [place] laws. We do not discriminate on the basis of race, color, national origin, age, disability, sex, sexual orientation, or gender identity.            Thank you!     Thank you for choosing [facility] OPHTHALMOLOGY  for your care. Our goal is always to provide you with excellent care. Hearing back from our patients is one way we can continue to improve our services. Please take a few minutes to complete the written survey that you may receive in the mail after  "your visit with us. Thank you!             Your Updated Medication List - Protect others around you: Learn how to safely use, store and throw away your medicines at www.disposemymeds.org.          This list is accurate as of: 10/17/17  9:45 AM.  Always use your most recent med list.                   Brand Name Dispense Instructions for use Diagnosis    * carboxymethylcellulose 1 % ophthalmic solution    CELLUVISC/REFRESH LIQUIGEL    90 each    Place 1 drop into both eyes 4 times daily Dispose of dropperette within 24 hours after opening or if the tip is contaminated.    Dry eyes, bilateral, Limbal stem cell deficiency, bilateral, Pannus (corneal), bilateral       * carboxymethylcellulose 1 % ophthalmic solution    CELLUVISC/REFRESH LIQUIGEL    90 each    Place 1 drop into both eyes every hour (while awake) Dispose of dropperette within 24 hours after opening or if the tip is contaminated.    Limbal stem cell deficiency, bilateral, Dry eyes, bilateral       * carboxymethylcellulose 0.5 % Soln ophthalmic solution    carboxymethylcellulose sodium    70 each    Place 1 drop into both eyes 6 times daily    Eyelid retraction or lag, Limbal stem cell deficiency, bilateral       cephALEXin 500 MG capsule    KEFLEX    20 capsule    Take 1 capsule (500 mg) by mouth 3 times daily    Preseptal cellulitis of right eye       ciprofloxacin 0.3 % ophthalmic solution    CILOXAN    1 Bottle    1 drop QID    Keratitis       cycloSPORINE 0.05 % ophthalmic emulsion    RESTASIS    3 Box    Place 1 drop into both eyes 2 times daily    Dry eyes, bilateral, Limbal stem cell deficiency, bilateral       * erythromycin ophthalmic ointment    ROMYCIN    3.5 g    Apply 1/2\" strip to left eye three times a day    Postoperative eye state       * erythromycin ophthalmic ointment    ROMYCIN    3.5 g    Apply small amount to incision sites three times daily after the dressing has been removed in clinic    Postoperative eye state       " fluorometholone 0.1 % ophthalmic susp    FML LIQUIFILM    15 mL    Place 1 drop into both eyes 4 times daily    Keratitis       HYDROcodone-acetaminophen 5-325 MG per tablet    NORCO    15 tablet    Take 1 tablet by mouth every 6 hours as needed for pain Maximum of 4000 mg of acetaminophen in 24 hours.    Postoperative eye state       neomycin-polymyxin-dexamethasone 3.5-49303-9.1 Susp ophthalmic susp    MAXITROL    1 Bottle    Place 1 drop into the right eye 3 times daily One drop to operated eye(s) three times daily for 10 days.    Postoperative eye state       prednisoLONE acetate 1 % ophthalmic susp    PRED FORTE    5 mL    Place 1 drop into both eyes 2 times daily    Limbal stem cell deficiency, bilateral, Abrasion of conjunctiva, unspecified laterality, initial encounter       SUMAtriptan 100 MG tablet    IMITREX     at onset of headache    Throat pain       trimethoprim-polymyxin b ophthalmic solution    POLYTRIM    1 Bottle    Place 1 drop into both eyes 4 times daily    Keratitis       TYLENOL PO      Take by mouth every 4 hours as needed        * Notice:  This list has 5 medication(s) that are the same as other medications prescribed for you. Read the directions carefully, and ask your doctor or other care provider to review them with you.

## 2017-10-17 NOTE — NURSING NOTE
Chief Complaints and History of Present Illnesses   Patient presents with     Pain In Or Around Eye     HPI    Affected eye(s):  Both   Symptoms:     Blurred vision   Foreign body sensation         Do you have eye pain now?:  Yes   Location:  [facility]   Pain Duration:  1 week   Pain Frequency:  Constant      Comments:  Patient states she has a BCL only in the     Tasneem Stake October 17, 2017 9:07 AM

## 2017-11-19 ENCOUNTER — HOSPITAL ENCOUNTER (EMERGENCY)
Facility: CLINIC | Age: 22
Discharge: HOME | End: 2017-11-20
Attending: EMERGENCY MEDICINE | Admitting: EMERGENCY MEDICINE
Payer: COMMERCIAL

## 2017-11-19 ENCOUNTER — APPOINTMENT (OUTPATIENT)
Dept: CT IMAGING | Facility: CLINIC | Age: 22
End: 2017-11-19
Attending: EMERGENCY MEDICINE
Payer: COMMERCIAL

## 2017-11-19 VITALS
OXYGEN SATURATION: 100 % | DIASTOLIC BLOOD PRESSURE: 62 MMHG | SYSTOLIC BLOOD PRESSURE: 112 MMHG | HEART RATE: 72 BPM | RESPIRATION RATE: 20 BRPM | TEMPERATURE: 97.6 F

## 2017-11-19 DIAGNOSIS — K59.00 CONSTIPATION, UNSPECIFIED CONSTIPATION TYPE: ICD-10-CM

## 2017-11-19 DIAGNOSIS — N94.6 DYSMENORRHEA: Primary | ICD-10-CM

## 2017-11-19 DIAGNOSIS — R10.12 ABDOMINAL PAIN, LEFT UPPER QUADRANT: ICD-10-CM

## 2017-11-19 LAB
ALBUMIN UR-MCNC: NEGATIVE MG/DL
ANION GAP SERPL CALCULATED.3IONS-SCNC: 8 MMOL/L (ref 3–14)
APPEARANCE UR: CLEAR
BACTERIA #/AREA URNS HPF: ABNORMAL /HPF
BILIRUB UR QL STRIP: NEGATIVE
BUN SERPL-MCNC: 5 MG/DL (ref 7–30)
CALCIUM SERPL-MCNC: 7.9 MG/DL (ref 8.5–10.1)
CHLORIDE SERPL-SCNC: 110 MMOL/L (ref 94–109)
CO2 SERPL-SCNC: 22 MMOL/L (ref 20–32)
COLOR UR AUTO: YELLOW
CREAT SERPL-MCNC: 0.61 MG/DL (ref 0.52–1.04)
ERYTHROCYTE [DISTWIDTH] IN BLOOD BY AUTOMATED COUNT: 17.6 % (ref 10–15)
GFR SERPL CREATININE-BSD FRML MDRD: >90 ML/MIN/1.7M2
GLUCOSE SERPL-MCNC: 86 MG/DL (ref 70–99)
GLUCOSE UR STRIP-MCNC: NEGATIVE MG/DL
HCG UR QL: NEGATIVE
HCT VFR BLD AUTO: 30 % (ref 35–47)
HGB BLD-MCNC: 9.1 G/DL (ref 11.7–15.7)
HGB UR QL STRIP: ABNORMAL
KETONES UR STRIP-MCNC: NEGATIVE MG/DL
LEUKOCYTE ESTERASE UR QL STRIP: NEGATIVE
LIPASE SERPL-CCNC: NORMAL U/L (ref 73–393)
MCH RBC QN AUTO: 22.6 PG (ref 26.5–33)
MCHC RBC AUTO-ENTMCNC: 30.3 G/DL (ref 31.5–36.5)
MCV RBC AUTO: 75 FL (ref 78–100)
MUCOUS THREADS #/AREA URNS LPF: PRESENT /LPF
NITRATE UR QL: NEGATIVE
PH UR STRIP: 8 PH (ref 5–7)
PLATELET # BLD AUTO: 279 10E9/L (ref 150–450)
POTASSIUM SERPL-SCNC: 3.8 MMOL/L (ref 3.4–5.3)
RBC # BLD AUTO: 4.02 10E12/L (ref 3.8–5.2)
RBC #/AREA URNS AUTO: 44 /HPF (ref 0–2)
SODIUM SERPL-SCNC: 140 MMOL/L (ref 133–144)
SOURCE: ABNORMAL
SP GR UR STRIP: 1.01 (ref 1–1.03)
UROBILINOGEN UR STRIP-MCNC: NORMAL MG/DL (ref 0–2)
WBC # BLD AUTO: 4.5 10E9/L (ref 4–11)
WBC #/AREA URNS AUTO: <1 /HPF (ref 0–2)

## 2017-11-19 PROCEDURE — 96375 TX/PRO/DX INJ NEW DRUG ADDON: CPT

## 2017-11-19 PROCEDURE — 80048 BASIC METABOLIC PNL TOTAL CA: CPT | Performed by: EMERGENCY MEDICINE

## 2017-11-19 PROCEDURE — 81025 URINE PREGNANCY TEST: CPT | Performed by: EMERGENCY MEDICINE

## 2017-11-19 PROCEDURE — 85027 COMPLETE CBC AUTOMATED: CPT | Performed by: EMERGENCY MEDICINE

## 2017-11-19 PROCEDURE — 74176 CT ABD & PELVIS W/O CONTRAST: CPT

## 2017-11-19 PROCEDURE — 25000128 H RX IP 250 OP 636: Performed by: EMERGENCY MEDICINE

## 2017-11-19 PROCEDURE — 96376 TX/PRO/DX INJ SAME DRUG ADON: CPT

## 2017-11-19 PROCEDURE — 96374 THER/PROPH/DIAG INJ IV PUSH: CPT

## 2017-11-19 PROCEDURE — 99285 EMERGENCY DEPT VISIT HI MDM: CPT | Mod: 25

## 2017-11-19 PROCEDURE — 81001 URINALYSIS AUTO W/SCOPE: CPT | Performed by: EMERGENCY MEDICINE

## 2017-11-19 RX ORDER — KETOROLAC TROMETHAMINE 15 MG/ML
15 INJECTION, SOLUTION INTRAMUSCULAR; INTRAVENOUS ONCE
Status: COMPLETED | OUTPATIENT
Start: 2017-11-19 | End: 2017-11-19

## 2017-11-19 RX ORDER — HYDROCODONE BITARTRATE AND ACETAMINOPHEN 5; 325 MG/1; MG/1
1 TABLET ORAL EVERY 4 HOURS PRN
Qty: 6 TABLET | Refills: 0 | Status: SHIPPED | OUTPATIENT
Start: 2017-11-19 | End: 2018-05-30

## 2017-11-19 RX ORDER — ONDANSETRON 2 MG/ML
4 INJECTION INTRAMUSCULAR; INTRAVENOUS ONCE
Status: COMPLETED | OUTPATIENT
Start: 2017-11-19 | End: 2017-11-19

## 2017-11-19 RX ORDER — ONDANSETRON 4 MG/1
4 TABLET, ORALLY DISINTEGRATING ORAL EVERY 8 HOURS PRN
Qty: 10 TABLET | Refills: 0 | Status: SHIPPED | OUTPATIENT
Start: 2017-11-19 | End: 2017-11-22

## 2017-11-19 RX ORDER — MORPHINE SULFATE 4 MG/ML
4 INJECTION, SOLUTION INTRAMUSCULAR; INTRAVENOUS ONCE
Status: COMPLETED | OUTPATIENT
Start: 2017-11-19 | End: 2017-11-19

## 2017-11-19 RX ADMIN — MORPHINE SULFATE 4 MG: 4 INJECTION, SOLUTION INTRAMUSCULAR; INTRAVENOUS at 23:30

## 2017-11-19 RX ADMIN — KETOROLAC TROMETHAMINE 15 MG: 15 INJECTION, SOLUTION INTRAMUSCULAR; INTRAVENOUS at 23:30

## 2017-11-19 RX ADMIN — ONDANSETRON 4 MG: 2 SOLUTION INTRAMUSCULAR; INTRAVENOUS at 22:47

## 2017-11-19 RX ADMIN — KETOROLAC TROMETHAMINE 15 MG: 15 INJECTION, SOLUTION INTRAMUSCULAR; INTRAVENOUS at 22:49

## 2017-11-19 ASSESSMENT — ENCOUNTER SYMPTOMS
HEMATURIA: 0
VOMITING: 0
FLANK PAIN: 1
NAUSEA: 1
DYSURIA: 0
FREQUENCY: 0

## 2017-11-19 NOTE — ED AVS SNAPSHOT
Emergency Department    [address] [facility] [address]    Phone:  [phone]    Fax:  [phone]                                       Chloe Cleaning   MRN: [id number]    Department:   Emergency Department   Date of Visit:  11/19/2017           After Visit Summary Signature Page     I have received my discharge instructions, and my questions have been answered. I have discussed any challenges I see with this plan with the nurse or doctor.    ..........................................................................................................................................  Patient/Patient Representative Signature      ..........................................................................................................................................  Patient Representative Print Name and Relationship to Patient    ..................................................               ................................................  Date                                            Time    ..........................................................................................................................................  Reviewed by Signature/Title    ...................................................              ..............................................  Date                                                            Time

## 2017-11-19 NOTE — ED AVS SNAPSHOT
Emergency Department    [address] [facility] [address]    Phone:  [phone]    Fax:  [phone]                                       Chloe Cleaning   MRN: [id number]    Department:   Emergency Department   Date of Visit:  11/19/2017           Patient Information     Date Of Birth          1995        Your diagnoses for this visit were:     Constipation, unspecified constipation type     Abdominal pain, left upper quadrant     Dysmenorrhea        You were seen by Sherice Lizama MD.      Follow-up Information     Schedule an appointment as soon as possible for a visit with No Ref-Primary, Physician.    Contact information:    NO REF-PRIMARY PHYSICIAN          Follow up with  Emergency Department.    Specialty:  EMERGENCY MEDICINE    Why:  If symptoms worsen    Contact information:    [address] [facility] [address] [phone]        Discharge Instructions       Start taking miralax twice per day  Start taking colace and senna 1-2 times per day for constipation. These medications are available over the counter    Use ibuprofen for pain 600 mg every 6 hours for pain    Make an appointment with primary care provider to have your hemoglobin (blood count) repeated as it was low today      Discharge Instructions  Constipation  Constipation can cause severe cramping pain and your provider thinks this might be the cause of your abdominal pain (belly pain) today.  People usually recognize that they are constipated because they have difficulty having bowel movements, are not having bowel movements frequently enough, or are not having large enough bowel movements. Sometimes, especially in children or older people, you do not recognize that you are constipated until it becomes severe. The most common causes of constipation are a lack of exercise and not eating enough fruits, vegetables, and whole grains. Constipation can also be a side effect of medications, such  as narcotics, or may be caused by a disease of the digestive system.    Generally, every Emergency Department visit should have a follow-up clinic visit with either a primary or a specialty clinic/provider. Please follow-up as instructed by your emergency provider today. Sometimes, chronic constipation requires further testing to determine the cause. If you are over 50 years old, you may need a colonoscopy if you have not had one before.     Return to the Emergency Department if:    Your abdominal pain worsens or does not improve after a bowel movement.    You become very weak.    You get a temperature above 102oF or as directed by your provider.    You have blood in your stools (bright red or black, tarry stools).    You keep vomiting (throwing up) or cannot drink liquids.    Your see blood when you vomit.    Your stomach gets bloated or bigger.    You have new symptoms or anything that worries you.    What can I do to help myself?    If your provider gave you a cathartic medication, like magnesium citrate or GoLytely  (polyethylene glycol), you can expect to have cramps and gas pains after taking it. You can expect to have a number of bowel movements and even diarrhea (loose or watery stools) in the course of clearing your bowels.  You will know your bowels have been cleaned out after you pass clear liquid. The cramps and gas should let up after you have emptied your bowels. You may want to wait until morning to take this type of medication so you aren t up in the night.     Sometimes instead of cathartics, we recommend laxatives like milk of magnesia to move your bowels more slowly, or an enema to help the bowels to move. Read and follow the package directions, or follow your provider s instructions.    Once you have become very constipated, it takes time for your bowels to return to normal and you need to be very careful to prevent becoming constipated again. Take a laxative if you do not move your bowels at  least every two days.       Eat foods that have a lot of fiber. Good choices are fruits, vegetables, prune juice, apple juice, and high fiber cereal. Limit dairy products such as milk and cheese, since these can make constipation worse.     Drink plenty of water.     When you feel the need to go to the bathroom, go to the bathroom. Do not hold it.    Miralax , Metamucil , Colace , Senna or fiber supplements can be used daily.  Miralax  daily is often the best choice for children.  If you were given a prescription for medicine here today, be sure to read all of the information (including the package insert) that comes with your prescription.  This will include important information about the medicine, its side effects, and any warnings that you need to know about.  The pharmacist who fills the prescription can provide more information and answer questions you may have about the medicine.  If you have questions or concerns that the pharmacist cannot address, please call or return to the Emergency Department.   Remember that you can always come back to the Emergency Department if you are not able to see your regular provider in the amount of time listed above, if you get any new symptoms, or if there is anything that worries you.    Opioid Medication Information    You have been given a prescription for an opioid (narcotic) pain medicine and/or have received a pain medicine while here in the Emergency Department. These medicines can make you drowsy or impaired. You must not drive, operate dangerous equipment, or engage in any other dangerous activities while taking these medications. If you drive while taking these medications, you could be arrested for driving under the influence (DUI). Do not drink any alcohol while you are taking these medications.     Opioid pain medications can cause addiction. If you have a history of chemical dependency of any type, you are at a higher risk of becoming addicted to pain  medications.  Only take these prescribed medications to treat your pain when all other options have been tried. Take it for as short a time and as few doses as possible. Store your pain pills in a secure place, as they are frequently stolen and provide a dangerous opportunity for children or visitors in your house to start abusing these powerful medications. We will not replace any lost or stolen medicine. If you do not finish your medication, please dispose of the remaining medication as recommended by your pharmacist.     The [place] [employer] has additional information on medication disposal: [email or web address]://[email or web address]/[email or web address]-unwanted-medications.      Many prescription pain medications contain Tylenol  (acetaminophen), including Vicodin , Tylenol #3 , Norco , Lortab , and Percocet .  You should not take any extra pills of Tylenol  if you are using these prescription medications or you can get very sick.  Do not ever take more than 3000 mg of acetaminophen in any 24 hour period.    All opioids tend to cause constipation. Drink plenty of water and eat foods that have a lot of fiber, such as fruits, vegetables, prune juice, apple juice and high fiber cereal.  Take a laxative if you don t move your bowels at least every other day. Miralax , Milk of Magnesia, Colace , or Senna  can be used to keep you regular.          Future Appointments        Provider Department Dept Phone Center    11/20/2017 8:45 AM Rey Gonzalez MD; [facility] [phone] [facility]      24 Hour Appointment Hotline       To make an appointment at any [facility], call [phone] ([phone]). If you don't have a family doctor or clinic, we will help you find one. [place] clinics are conveniently located to serve the needs of you and your family.             Review of your medicines      START taking        Dose / Directions Last dose taken    ondansetron 4 MG ODT tab   Commonly  "known as:  ZOFRAN ODT   Dose:  4 mg   Quantity:  10 tablet        Take 1 tablet (4 mg) by mouth every 8 hours as needed   Refills:  0          CONTINUE these medicines which may have CHANGED, or have new prescriptions. If we are uncertain of the size of tablets/capsules you have at home, strength may be listed as something that might have changed.        Dose / Directions Last dose taken    erythromycin ophthalmic ointment   Commonly known as:  ROMYCIN   What changed:  Another medication with the same name was removed. Continue taking this medication, and follow the directions you see here.   Quantity:  3.5 g        Apply 1/2\" strip to left eye three times a day   Refills:  0        HYDROcodone-acetaminophen 5-325 MG per tablet   Commonly known as:  NORCO   Dose:  1 tablet   What changed:    - when to take this  - reasons to take this  - additional instructions   Quantity:  6 tablet        Take 1 tablet by mouth every 4 hours as needed for moderate to severe pain   Refills:  0          Our records show that you are taking the medicines listed below. If these are incorrect, please call your family doctor or clinic.        Dose / Directions Last dose taken    * carboxymethylcellulose 1 % ophthalmic solution   Commonly known as:  CELLUVISC/REFRESH LIQUIGEL   Dose:  1 drop   Quantity:  90 each        Place 1 drop into both eyes 4 times daily Dispose of dropperette within 24 hours after opening or if the tip is contaminated.   Refills:  11        * carboxymethylcellulose 1 % ophthalmic solution   Commonly known as:  CELLUVISC/REFRESH LIQUIGEL   Dose:  1 drop   Quantity:  90 each        Place 1 drop into both eyes every hour (while awake) Dispose of dropperette within 24 hours after opening or if the tip is contaminated.   Refills:  4        * carboxymethylcellulose 0.5 % Soln ophthalmic solution   Commonly known as:  carboxymethylcellulose sodium   Dose:  1 drop   Quantity:  70 each        Place 1 drop into both eyes 6 " times daily   Refills:  3        cephALEXin 500 MG capsule   Commonly known as:  KEFLEX   Dose:  500 mg   Quantity:  20 capsule        Take 1 capsule (500 mg) by mouth 3 times daily   Refills:  0        ciprofloxacin 0.3 % ophthalmic solution   Commonly known as:  CILOXAN   Quantity:  1 Bottle        1 drop QID   Refills:  11        cycloSPORINE 0.05 % ophthalmic emulsion   Commonly known as:  RESTASIS   Dose:  1 drop   Quantity:  3 Box        Place 1 drop into both eyes 2 times daily   Refills:  3        fluorometholone 0.1 % ophthalmic susp   Commonly known as:  FML LIQUIFILM   Dose:  1 drop   Quantity:  15 mL        Place 1 drop into both eyes 4 times daily   Refills:  11        neomycin-polymyxin-dexamethasone 3.5-18327-5.1 Susp ophthalmic susp   Commonly known as:  MAXITROL   Dose:  1 drop   Quantity:  1 Bottle        Place 1 drop into the right eye 3 times daily One drop to operated eye(s) three times daily for 10 days.   Refills:  0        prednisoLONE acetate 1 % ophthalmic susp   Commonly known as:  PRED FORTE   Dose:  1 drop   Quantity:  5 mL        Place 1 drop into both eyes 2 times daily   Refills:  0        SUMAtriptan 100 MG tablet   Commonly known as:  IMITREX        at onset of headache   Refills:  3        trimethoprim-polymyxin b ophthalmic solution   Commonly known as:  POLYTRIM   Dose:  1 drop   Quantity:  1 Bottle        Place 1 drop into both eyes 4 times daily   Refills:  11        TYLENOL PO        Take by mouth every 4 hours as needed   Refills:  0        * Notice:  This list has 3 medication(s) that are the same as other medications prescribed for you. Read the directions carefully, and ask your doctor or other care provider to review them with you.            Prescriptions were sent or printed at these locations (2 Prescriptions)                   Other Prescriptions                Printed at Department/Unit printer (2 of 2)         ondansetron (ZOFRAN ODT) 4 MG ODT tab                HYDROcodone-acetaminophen (NORCO) 5-325 MG per tablet                Procedures and tests performed during your visit     Abd/pelvis CT no contrast - [facility] Protocol    Basic metabolic panel    CBC (+ platelets, no diff)    HCG qualitative urine    Lipase    UA with Microscopic reflex to Culture      Orders Needing Specimen Collection     None      Pending Results     Date and Time Order Name Status Description    11/19/2017 2239 Abd/pelvis CT no contrast - [facility] Protocol Preliminary             Pending Culture Results     No orders found from 11/17/2017 to 11/20/2017.            Pending Results Instructions     If you had any lab results that were not finalized at the time of your Discharge, you can call the ED Lab Result RN at [phone]. You will be contacted by this team for any positive Lab results or changes in treatment. The nurses are available 7 days a week from 10A to 6:30P.  You can leave a message 24 hours per day and they will return your call.        Test Results From Your Hospital Stay        11/19/2017 11:08 PM      Component Results     Component Value Ref Range & Units Status    HCG Qual Urine Negative NEG^Negative Final    This test is for screening purposes.  Results should be interpreted along with   the clinical picture.  Confirmation testing is available if warranted by   ordering QSB050, HCG Quantitative Pregnancy.           11/19/2017 11:06 PM      Component Results     Component Value Ref Range & Units Status    Color Urine Yellow  Final    Appearance Urine Clear  Final    Glucose Urine Negative NEG^Negative mg/dL Final    Bilirubin Urine Negative NEG^Negative Final    Ketones Urine Negative NEG^Negative mg/dL Final    Specific Gravity Urine 1.012 1.003 - 1.035 Final    Blood Urine Moderate (A) NEG^Negative Final    pH Urine 8.0 (H) 5.0 - 7.0 pH Final    Protein Albumin Urine Negative NEG^Negative mg/dL Final    Urobilinogen mg/dL Normal 0.0 - 2.0 mg/dL Final    Nitrite Urine Negative  NEG^Negative Final    Leukocyte Esterase Urine Negative NEG^Negative Final    Source Midstream Urine  Final    WBC Urine <1 0 - 2 /HPF Final    RBC Urine 44 (H) 0 - 2 /HPF Final    Bacteria Urine Few (A) NEG^Negative /HPF Final    Mucous Urine Present (A) NEG^Negative /LPF Final         11/19/2017 11:05 PM      Component Results     Component Value Ref Range & Units Status    Sodium 140 133 - 144 mmol/L Final    Potassium 3.8 3.4 - 5.3 mmol/L Final    Chloride 110 (H) 94 - 109 mmol/L Final    Carbon Dioxide 22 20 - 32 mmol/L Final    Anion Gap 8 3 - 14 mmol/L Final    Glucose 86 70 - 99 mg/dL Final    Urea Nitrogen 5 (L) 7 - 30 mg/dL Final    Creatinine 0.61 0.52 - 1.04 mg/dL Final    GFR Estimate >90 >60 mL/min/1.7m2 Final    Non  GFR Calc    GFR Estimate If Black >90 >60 mL/min/1.7m2 Final    African American GFR Calc    Calcium 7.9 (L) 8.5 - 10.1 mg/dL Final         11/19/2017 10:52 PM      Component Results     Component Value Ref Range & Units Status    WBC 4.5 4.0 - 11.0 10e9/L Final    RBC Count 4.02 3.8 - 5.2 10e12/L Final    Hemoglobin 9.1 (L) 11.7 - 15.7 g/dL Final    Hematocrit 30.0 (L) 35.0 - 47.0 % Final    MCV 75 (L) 78 - 100 fl Final    MCH 22.6 (L) 26.5 - 33.0 pg Final    MCHC 30.3 (L) 31.5 - 36.5 g/dL Final    RDW 17.6 (H) 10.0 - 15.0 % Final    Platelet Count 279 150 - 450 10e9/L Final         11/19/2017 11:19 PM      Narrative     CT ABDOMEN PELVIS W/O CONTRAST  11/19/2017 11:11 PM     INDICATION: Left-sided abdominal pain, nausea, left flank pain.       TECHNIQUE: Thin axial images through the abdomen and pelvis without  contrast. Coronal reformatted images. Radiation dose for this scan was  reduced using automated exposure control, adjustment of the mA and/or  kV according to patient size, or iterative reconstruction technique.    COMPARISON: None.    FINDINGS: Minimal hazy opacity at the lung bases, likely atelectasis.  No urinary stones or hydronephrosis. Upper abdominal  organs  demonstrate no worrisome findings without the benefit of contrast.    No suspicious pelvic masses. Moderate stool in the colon. No bowel  obstruction, ascites or inflammatory changes demonstrated. No  destructive bone lesions.        Impression     IMPRESSION:  1. No urinary stones or hydronephrosis.  2. No other acute findings.  3. Moderate stool in the colon.         11/19/2017 11:45 PM      Component Results     Component Value Ref Range & Units Status    Lipase Canceled, Test credited 73 - 393 U/L Final    Test canceled by PCU/Clinic                Clinical Quality Measure: Blood Pressure Screening     Your blood pressure was checked while you were in the emergency department today. The last reading we obtained was  BP: 112/62 . Please read the guidelines below about what these numbers mean and what you should do about them.  If your systolic blood pressure (the top number) is less than 120 and your diastolic blood pressure (the bottom number) is less than 80, then your blood pressure is normal. There is nothing more that you need to do about it.  If your systolic blood pressure (the top number) is 120-139 or your diastolic blood pressure (the bottom number) is 80-89, your blood pressure may be higher than it should be. You should have your blood pressure rechecked within a year by a primary care provider.  If your systolic blood pressure (the top number) is 140 or greater or your diastolic blood pressure (the bottom number) is 90 or greater, you may have high blood pressure. High blood pressure is treatable, but if left untreated over time it can put you at risk for heart attack, stroke, or kidney failure. You should have your blood pressure rechecked by a primary care provider within the next 4 weeks.  If your provider in the emergency department today gave you specific instructions to follow-up with your doctor or provider even sooner than that, you should follow that instruction and not wait for up  "to 4 weeks for your follow-up visit.        Thank you for choosing [facility]       Thank you for choosing [facility] for your care. Our goal is always to provide you with excellent care. Hearing back from our patients is one way we can continue to improve our services. Please take a few minutes to complete the written survey that you may receive in the mail after you visit with us. Thank you!        [employer] Information     [employer] lets you send messages to your doctor, view your test results, renew your prescriptions, schedule appointments and more. To sign up, go to [email or web address] . Click on \"Log in\" on the left side of the screen, which will take you to the Welcome page. Then click on \"Sign up Now\" on the right side of the page.     You will be asked to enter the access code listed below, as well as some personal information. Please follow the directions to create your username and password.     Your access code is: [email or web address]  Expires: 2018  6:30 AM     Your access code will  in 90 days. If you need help or a new code, please call your [facility] clinic or [phone].        Care EveryWhere ID     This is your Care EveryWhere ID. This could be used by other organizations to access your [facility] medical records  [id number]        Equal Access to Services     DYLON LOU : Amie Bloom, waaxda luqadaha, qaybta kaalmada adetyrone, eliane haile. So [facility] [phone].    ATENCIÓN: Si habla español, tiene a de la cruz disposición servicios gratuitos de asistencia lingüística. Llame al [phone].    We comply with applicable federal civil rights laws and [place] laws. We do not discriminate on the basis of race, color, national origin, age, disability, sex, sexual orientation, or gender identity.            After Visit Summary       This is your record. Keep this with you and show to your community pharmacist(s) and doctor(s) at your next visit.             "

## 2017-11-20 ENCOUNTER — OFFICE VISIT (OUTPATIENT)
Dept: OPHTHALMOLOGY | Facility: CLINIC | Age: 22
End: 2017-11-20
Attending: OPHTHALMOLOGY
Payer: COMMERCIAL

## 2017-11-20 DIAGNOSIS — H11.133: ICD-10-CM

## 2017-11-20 DIAGNOSIS — H18.893 LIMBAL STEM CELL DEFICIENCY, BILATERAL: Primary | ICD-10-CM

## 2017-11-20 DIAGNOSIS — H16.423 PANNUS (CORNEAL), BILATERAL: ICD-10-CM

## 2017-11-20 DIAGNOSIS — H04.123 DRY EYES, BILATERAL: ICD-10-CM

## 2017-11-20 PROCEDURE — 99213 OFFICE O/P EST LOW 20 MIN: CPT | Mod: ZF

## 2017-11-20 PROCEDURE — 68761 CLOSE TEAR DUCT OPENING: CPT | Mod: ZF | Performed by: OPHTHALMOLOGY

## 2017-11-20 RX ORDER — CYCLOSPORINE 0.5 MG/ML
1 EMULSION OPHTHALMIC 2 TIMES DAILY
Qty: 30 EACH | Refills: 3 | Status: SHIPPED | OUTPATIENT
Start: 2017-11-20 | End: 2019-04-03

## 2017-11-20 ASSESSMENT — CONF VISUAL FIELD
OD_INFERIOR_TEMPORAL_RESTRICTION: 3
OD_SUPERIOR_NASAL_RESTRICTION: 3
OS_SUPERIOR_TEMPORAL_RESTRICTION: 3
OD_INFERIOR_NASAL_RESTRICTION: 3
OS_SUPERIOR_NASAL_RESTRICTION: 3
OS_INFERIOR_NASAL_RESTRICTION: 3
OS_INFERIOR_TEMPORAL_RESTRICTION: 3
OD_SUPERIOR_TEMPORAL_RESTRICTION: 3

## 2017-11-20 ASSESSMENT — TONOMETRY
IOP_METHOD: ICARE
OD_IOP_MMHG: 11
OS_IOP_MMHG: 12

## 2017-11-20 ASSESSMENT — VISUAL ACUITY
METHOD: SNELLEN - LINEAR
OD_SC: 20/70
OS_SC: 20/40
OD_PH_SC: 20/60
OS_SC+: -2
OS_PH_SC: 20/30
OD_SC+: -2

## 2017-11-20 NOTE — NURSING NOTE
Chief Complaints and History of Present Illnesses   Patient presents with     Follow Up For     Limbal stem cell deficiency     HPI    Affected eye(s):  Both   Symptoms:        Frequency:  Constant       Do you have eye pain now?:  No      Comments:  Feels that the va was getting better but the last 2 weeks there has been pain  States that she has only been using 1 gtt not both  Pat BENZ 9:10 AM November 20, 2017

## 2017-11-20 NOTE — ED PROVIDER NOTES
History     Chief Complaint:  Flank Pain      HPI   Chloe Cleaning is a 22 year old female, with a history of MS, who presents with left flank pain, beginning at 1900 tonight. Patient reports she has had episodes like this in the past related to dysmenorrhea but it hasn't been this bad for awhile. The pain radiates down her left leg which is typical for this pain. Patient has associated nausea. She is currently on her period. She typically has a BM every 2 weeks. Her most recent was yesterday. In 2015 she came in for abdominal pain radiating down her leg which is typical with her pain with her periods. She has not had any abdominal surgeries. Patient denies vomiting, dysuria, hematuria, fever and increased urinary frequency. Phone Ot[facility]  used for history.    Allergies:  No known drug allergies.    Medications:    Polytrim drops  Ciprofloxacin  Prednisolone acetate  Keflex  Romycin drops  Norco  Maxitrol  Carboxymethylcellulose  Restasis  Imitrex  Flurometholone drops  Tylenol     Past Medical History:    MS  Conjunctival melanosis, bilateral  Keratitis  Dry eye  Limbal stem cell deficiency   Pannus (corneal), bilateral    Past Surgical History:    Repair retraction lid x 2  Tarsorrhaphy  x 2    Family History:    History reviewed. No pertinent family history.    Social History:  Marital Status: Single  Presents to the ED with her brother.   Tobacco Use: Never  Alcohol Use: No  PCP: Physician No Ref-Primary     Review of Systems   Gastrointestinal: Positive for nausea. Negative for vomiting.   Genitourinary: Positive for flank pain. Negative for dysuria, frequency and hematuria.   Musculoskeletal:        Positive for left leg pain.    All other systems reviewed and are negative.      Physical Exam   First Vitals:  BP: 111/60  Pulse: 72  Temp: 97.6  F (36.4  C)  Resp: 18  SpO2: 99 %      Physical Exam  General: Resting comfortably on the gurney  Eyes:  The pupils are equal and round  ENT:    Moist mucous  membranes  Neck:  Normal range of motion  CV:  Regular rate and rhythm    Skin warm and well perfused     DP pulses 2+ bilaterally  Resp:  Lungs are clear    Non-labored    No rales    No wheezing   GI:  Abdomen is soft, there is no rigidity    No distension    Mild left-sided abdominal tenderness. Mild left flank tenderness.     No RLQ tenderness  MS:  Normal muscular tone    No pain on low back  Skin:  No rash or acute skin lesions noted  Neuro:   Awake, alert.      Speech is normal and fluent.    Face is symmetric.     Moves all extremities equally    Patellar reflexes 2+ bilaterally    SILT on bilateral LE    Gait stable  Psych: Normal affect.  Appropriate interactions.    Emergency Department Course     Imaging:  CT Abdomen and Pelvis, no contrast, stone protocol, per radiology:   IMPRESSION:  1. No urinary stones or hydronephrosis.  2. No other acute findings.  3. Moderate stool in the colon.    Radiographic findings were communicated with the patient who voiced understanding of the findings.    Laboratory:  CBC:  WBC 4.5, HGB 9.1 (L), , otherwise WNL  BMP: Chloride 110 (H), BUN 5 (L), otherwise WNL (Creatinine 0.61)  UA: Clear yellow urine, moderate blood, pH 8.0 (H), RBC 44 (H), few bacteria, mucous present, otherwise WNL  HCG urine: Negative    Interventions:  2247: Zofran, 4 mg, IV injection   2249: Toradol, 15 mg, IV injection  2330: Morphine, 4 mg, IV injection  2330: Toradol, 15 mg, IV injection    Emergency Department Course:  Nursing notes and vitals reviewed.  I performed an exam of the patient as documented above.  The above workup was undertaken.  2323: I rechecked the patient and discussed results. Patient is still in pain. Mild left upper quadrant and left flank tenderness. No lower abdominal tenderness.   Findings and plan explained to the Patient. Patient discharged home, status improved, with instructions regarding supportive care, medications, and reasons to return as well as the  importance of close follow-up was reviewed.     Impression & Plan      Medical Decision Making:  Chloe Cleaning is a 22 year old female who presents to the ED with flank pain. Vitals are normal. No lower abdominal tenderness on exam. She did look uncomfortable, and so I did consider ureteral stone as a cause of pain. The UA shows blood in it, but she is on her period which is likely the cause of this. No white blood cells to suggest UTI/pyelonephritis. She has normal neuro exam and doubt back pain etiologies such as cauda equina, epidural abscess, fracture as the cause of her symptoms. Her hemoglobin was slightly low at 9.1, though in the past it has been low 10s.  She is not pregnant. CT abdomen performed and no stones or hydronephrosis or acute findings are discovered, though she does have moderate stool in the colon. On further discussion with the patient, she only has a bowel movement every 2 weeks and so I recommended that she start stool softeners to help with this, which may be contributing to her pain. In addition, this pain is similar to pain in the past that is thought to be due to dysmenorrhea. Patient's pain able to be controlled here in the ED, tolerating oral intake. I recommended follow up with PCP for repeat hemoglobin and exam. No lower abdominal pain, sudden onset of pain or ovarian mass on CT to suggest ovarian torsion as cause of pain. Not sexually active with no abnormal vaginal discharged to suggest PID. No RLQ pain to suggest appendicitis. No sob, chest pain, hypoxia or tachycardia to suggest PE as cause of pain. Reasons to return to the ED given to the patient.     Diagnosis:    ICD-10-CM    1. Dysmenorrhea N94.6    2. Constipation, unspecified constipation type K59.00 Lipase     Lipase   3. Abdominal pain, left upper quadrant R10.12      Disposition:  Discharged to home.     Diana ORR, am serving as a scribe on 11/19/2017 at 10:21 PM to personally document services performed by   Alda based on my observations and the provider's statements to me.    EMERGENCY DEPARTMENT       Sherice Lizama MD  11/19/17 [id number]       Sherice Lizama MD  11/20/17 0004

## 2017-11-20 NOTE — PROGRESS NOTES
CC: f/up for LSCD OU    HPI: 21 year old Stateless female s/p eyelid surgery OD 6/13/17 here for f/u of limbal stem cell deficiency.     She notes mild pain in both eyes for last 2 weeks, denies fashes/floaters.   Saw Dr. Bowen 3 days prior for ocular discomfort, he removed CL at that visit with subsequent improved comfort      Previously using:  PFATs QID both eyes: not using as not filled by insurance  Restasis BID both eyes  Did not start pred forte or oflox    Assessment and Plan:    1. Post-operative swelling   - s/p eyelid surgery;    - followed up with oculoplastics and found to have normal post-operative state    2. 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 Baseline slit lamp photos 11/2015.   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 Mac   - restart Preservative free artificial tears Q1H both eyes (explained that these are OTC)   - continue Restasis OU twice a day    - minimize all ocular surface toxicity   - patient may ultimately need limbal stem cell transplant OD, could consider KLAL vs SLET with systemic immunosuppression    RTC 1-2 weeks    MIGUEL Roman  Cornea fellow    ~~~~~~~~~~~~~~~~~~~~~~~~~~~~~~~~~~~~~~~~~~~~~~~~~~~~~~~~~~~~~~~~    Complete documentation of historical and exam elements from today's encounter can be found in the full encounter summary report (not reduplicated in this progress note). I personally obtained the chief complaint(s) and history of present illness.  I confirmed and edited as necessary the review of systems, past medical/surgical history, family history, social history, and examination findings as documented by others; and I examined the patient myself. I personally reviewed the relevant  tests, images, and reports as documented above. I formulated and edited as necessary the assessment and plan and discussed the findings and management plan with the patient and family.    I was present for the entire procedure.    Rey Gonzalez MD

## 2017-11-20 NOTE — DISCHARGE INSTRUCTIONS
Start taking miralax twice per day  Start taking colace and senna 1-2 times per day for constipation. These medications are available over the counter    Use ibuprofen for pain 600 mg every 6 hours for pain    Make an appointment with primary care provider to have your hemoglobin (blood count) repeated as it was low today      Discharge Instructions  Constipation  Constipation can cause severe cramping pain and your provider thinks this might be the cause of your abdominal pain (belly pain) today.  People usually recognize that they are constipated because they have difficulty having bowel movements, are not having bowel movements frequently enough, or are not having large enough bowel movements. Sometimes, especially in children or older people, you do not recognize that you are constipated until it becomes severe. The most common causes of constipation are a lack of exercise and not eating enough fruits, vegetables, and whole grains. Constipation can also be a side effect of medications, such as narcotics, or may be caused by a disease of the digestive system.    Generally, every Emergency Department visit should have a follow-up clinic visit with either a primary or a specialty clinic/provider. Please follow-up as instructed by your emergency provider today. Sometimes, chronic constipation requires further testing to determine the cause. If you are over 50 years old, you may need a colonoscopy if you have not had one before.     Return to the Emergency Department if:    Your abdominal pain worsens or does not improve after a bowel movement.    You become very weak.    You get a temperature above 102oF or as directed by your provider.    You have blood in your stools (bright red or black, tarry stools).    You keep vomiting (throwing up) or cannot drink liquids.    Your see blood when you vomit.    Your stomach gets bloated or bigger.    You have new symptoms or anything that worries you.    What can I do to help  myself?    If your provider gave you a cathartic medication, like magnesium citrate or GoLytely  (polyethylene glycol), you can expect to have cramps and gas pains after taking it. You can expect to have a number of bowel movements and even diarrhea (loose or watery stools) in the course of clearing your bowels.  You will know your bowels have been cleaned out after you pass clear liquid. The cramps and gas should let up after you have emptied your bowels. You may want to wait until morning to take this type of medication so you aren t up in the night.     Sometimes instead of cathartics, we recommend laxatives like milk of magnesia to move your bowels more slowly, or an enema to help the bowels to move. Read and follow the package directions, or follow your provider s instructions.    Once you have become very constipated, it takes time for your bowels to return to normal and you need to be very careful to prevent becoming constipated again. Take a laxative if you do not move your bowels at least every two days.       Eat foods that have a lot of fiber. Good choices are fruits, vegetables, prune juice, apple juice, and high fiber cereal. Limit dairy products such as milk and cheese, since these can make constipation worse.     Drink plenty of water.     When you feel the need to go to the bathroom, go to the bathroom. Do not hold it.    Miralax , Metamucil , Colace , Senna or fiber supplements can be used daily.  Miralax  daily is often the best choice for children.  If you were given a prescription for medicine here today, be sure to read all of the information (including the package insert) that comes with your prescription.  This will include important information about the medicine, its side effects, and any warnings that you need to know about.  The pharmacist who fills the prescription can provide more information and answer questions you may have about the medicine.  If you have questions or concerns that  the pharmacist cannot address, please call or return to the Emergency Department.   Remember that you can always come back to the Emergency Department if you are not able to see your regular provider in the amount of time listed above, if you get any new symptoms, or if there is anything that worries you.    Opioid Medication Information    You have been given a prescription for an opioid (narcotic) pain medicine and/or have received a pain medicine while here in the Emergency Department. These medicines can make you drowsy or impaired. You must not drive, operate dangerous equipment, or engage in any other dangerous activities while taking these medications. If you drive while taking these medications, you could be arrested for driving under the influence (DUI). Do not drink any alcohol while you are taking these medications.     Opioid pain medications can cause addiction. If you have a history of chemical dependency of any type, you are at a higher risk of becoming addicted to pain medications.  Only take these prescribed medications to treat your pain when all other options have been tried. Take it for as short a time and as few doses as possible. Store your pain pills in a secure place, as they are frequently stolen and provide a dangerous opportunity for children or visitors in your house to start abusing these powerful medications. We will not replace any lost or stolen medicine. If you do not finish your medication, please dispose of the remaining medication as recommended by your pharmacist.     The [place] [employer] has additional information on medication disposal: https://www.pca.[facility].[place].us/living-green/managing-unwanted-medications.      Many prescription pain medications contain Tylenol  (acetaminophen), including Vicodin , Tylenol #3 , Norco , Lortab , and Percocet .  You should not take any extra pills of Tylenol  if you are using these prescription medications or you can get very  sick.  Do not ever take more than 3000 mg of acetaminophen in any 24 hour period.    All opioids tend to cause constipation. Drink plenty of water and eat foods that have a lot of fiber, such as fruits, vegetables, prune juice, apple juice and high fiber cereal.  Take a laxative if you don t move your bowels at least every other day. Miralax , Milk of Magnesia, Colace , or Senna  can be used to keep you regular.

## 2017-11-20 NOTE — MR AVS SNAPSHOT
After Visit Summary   11/20/2017    Chloe Cleaning    MRN: [id number]           Patient Information     Date Of Birth          1995        Visit Information        Provider Department      11/20/2017 8:45 AM Rey Gonzalez MD; [facility]        Today's Diagnoses     Limbal stem cell deficiency, bilateral - Both Eyes    -  1    Conjunctival melanosis, bilateral - Both Eyes        Dry eyes, bilateral - Both Eyes        Pannus (corneal), bilateral - Both Eyes           Follow-ups after your visit        Follow-up notes from your care team     Return in about 2 weeks (around 12/4/2017) for Follow Up.      Your next 10 appointments already scheduled     Dec 20, 2017  9:15 AM CST   RETURN CORNEA with Rey Gonzalez MD   Eye Clinic ([facility])    Marco Maldonado [facility]  [address]  [address]  [facility] [id number] [phone]              Who to contact     Please call your clinic at [phone] to:    Ask questions about your health    Make or cancel appointments    Discuss your medicines    Learn about your test results    Speak to your doctor   If you have compliments or concerns about an experience at your clinic, or if you wish to file a complaint, please contact [facility] Patient Relations at [phone] or email us at [email or web address]         Additional Information About Your Visit        MyChart Information     Kaizenat is an electronic gateway that provides easy, online access to your medical records. With Acumen, you can request a clinic appointment, read your test results, renew a prescription or communicate with your care team.     To sign up for Kaizenat visit the website at [email or web address]   You will be asked to enter the access code listed below, as well as some personal information. Please follow the directions to create your username and password.     Your access code  is: [email or web address]  Expires: 2018  6:30 AM     Your access code will  in 90 days. If you need help or a new code, please contact your [facility] or call [phone] for assistance.        Care EveryWhere ID     This is your Care EveryWhere ID. This could be used by other organizations to access your [place] medical records  [id number]         Blood Pressure from Last 3 Encounters:   17 112/62   17 121/75   17 107/69    Weight from Last 3 Encounters:   17 68 kg (150 lb)   17 68 kg (150 lb)   17 67 kg (147 lb 12.8 oz)              We Performed the Following     Punctal Closure, Plugs          Today's Medication Changes          These changes are accurate as of: 17 10:21 AM.  If you have any questions, ask your nurse or doctor.               These medicines have changed or have updated prescriptions.        Dose/Directions    * carboxymethylcellulose 1 % ophthalmic solution   Commonly known as:  CELLUVISC/REFRESH LIQUIGEL   This may have changed:  Another medication with the same name was added. Make sure you understand how and when to take each.   Used for:  Dry eyes, bilateral, Limbal stem cell deficiency, bilateral, Pannus (corneal), bilateral   Changed by:  Rey Gonzalez MD        Dose:  1 drop   Place 1 drop into both eyes 4 times daily Dispose of dropperette within 24 hours after opening or if the tip is contaminated.   Quantity:  90 each   Refills:  11       * carboxymethylcellulose 1 % ophthalmic solution   Commonly known as:  CELLUVISC/REFRESH LIQUIGEL   This may have changed:  Another medication with the same name was added. Make sure you understand how and when to take each.   Used for:  Limbal stem cell deficiency, bilateral, Dry eyes, bilateral   Changed by:  Rey Gonzalez MD        Dose:  1 drop   Place 1 drop into both eyes every hour (while awake) Dispose of dropperette within 24 hours after opening or if  the tip is contaminated.   Quantity:  90 each   Refills:  4       * carboxymethylcellulose 0.5 % Soln ophthalmic solution   Commonly known as:  carboxymethylcellulose sodium   This may have changed:  Another medication with the same name was added. Make sure you understand how and when to take each.   Used for:  Eyelid retraction or lag, Limbal stem cell deficiency, bilateral   Changed by:  Satya Lal MD        Dose:  1 drop   Place 1 drop into both eyes 6 times daily   Quantity:  70 each   Refills:  3       * Carboxymethylcellulose Sod PF 1 % ophthalmic solution   Commonly known as:  CELLUVISC/REFRESH LIQUIGEL   This may have changed:  You were already taking a medication with the same name, and this prescription was added. Make sure you understand how and when to take each.   Used for:  Limbal stem cell deficiency, bilateral, Dry eyes, bilateral   Changed by:  Rey Gonzalez MD        Dose:  1 drop   Place 1 drop into both eyes every hour (while awake) Dispose of dropperette within 24 hours after opening or if the tip is contaminated.   Quantity:  90 each   Refills:  4       * cycloSPORINE 0.05 % ophthalmic emulsion   Commonly known as:  RESTASIS   This may have changed:  Another medication with the same name was added. Make sure you understand how and when to take each.   Used for:  Dry eyes, bilateral, Limbal stem cell deficiency, bilateral   Changed by:  Rey Gonzalez MD        Dose:  1 drop   Place 1 drop into both eyes 2 times daily   Quantity:  3 Box   Refills:  3       * cycloSPORINE 0.05 % ophthalmic emulsion   Commonly known as:  RESTASIS   This may have changed:  You were already taking a medication with the same name, and this prescription was added. Make sure you understand how and when to take each.   Used for:  Limbal stem cell deficiency, bilateral, Dry eyes, bilateral   Changed by:  Rey Gonzalez MD        Dose:  1 drop   Place 1 drop into both eyes 2 times daily    Quantity:  30 each   Refills:  3       * Notice:  This list has 6 medication(s) that are the same as other medications prescribed for you. Read the directions carefully, and ask your doctor or other care provider to review them with you.         Where to get your medicines      These medications were sent to [facility] [address] [place] [address] [address] [facility]  [address] [facility] [address]     Phone:  [phone]     Carboxymethylcellulose Sod PF 1 % ophthalmic solution    cycloSPORINE 0.05 % ophthalmic emulsion                Primary Care Provider    Physician No Ref-Primary       NO REF-PRIMARY PHYSICIAN        Equal Access to Services     [facility] : Hadii teofilo ann hadasho Soradha, waaxda luqadaha, qaybta kaalmamata nance, eliane de jesus . So [facility] [phone].    ATENCIÓN: Si habla español, tiene a de la cruz disposición servicios gratuitos de asistencia lingüística. [facility] [phone].    We comply with applicable federal civil rights laws and [place] laws. We do not discriminate on the basis of race, color, national origin, age, disability, sex, sexual orientation, or gender identity.            Thank you!     Thank you for choosing EYE CLINIC  for your care. Our goal is always to provide you with excellent care. Hearing back from our patients is one way we can continue to improve our services. Please take a few minutes to complete the written survey that you may receive in the mail after your visit with us. Thank you!             Your Updated Medication List - Protect others around you: Learn how to safely use, store and throw away your medicines at www.disposemymeds.org.          This list is accurate as of: 11/20/17 10:21 AM.  Always use your most recent med list.                   Brand Name Dispense Instructions for use Diagnosis    * carboxymethylcellulose 1 % ophthalmic solution    CELLUVISC/REFRESH LIQUIGEL    90 each    Place 1 drop into both  "eyes 4 times daily Dispose of dropperette within 24 hours after opening or if the tip is contaminated.    Dry eyes, bilateral, Limbal stem cell deficiency, bilateral, Pannus (corneal), bilateral       * carboxymethylcellulose 1 % ophthalmic solution    CELLUVISC/REFRESH LIQUIGEL    90 each    Place 1 drop into both eyes every hour (while awake) Dispose of dropperette within 24 hours after opening or if the tip is contaminated.    Limbal stem cell deficiency, bilateral, Dry eyes, bilateral       * carboxymethylcellulose 0.5 % Soln ophthalmic solution    carboxymethylcellulose sodium    70 each    Place 1 drop into both eyes 6 times daily    Eyelid retraction or lag, Limbal stem cell deficiency, bilateral       * Carboxymethylcellulose Sod PF 1 % ophthalmic solution    CELLUVISC/REFRESH LIQUIGEL    90 each    Place 1 drop into both eyes every hour (while awake) Dispose of dropperette within 24 hours after opening or if the tip is contaminated.    Limbal stem cell deficiency, bilateral, Dry eyes, bilateral       cephALEXin 500 MG capsule    KEFLEX    20 capsule    Take 1 capsule (500 mg) by mouth 3 times daily    Preseptal cellulitis of right eye       ciprofloxacin 0.3 % ophthalmic solution    CILOXAN    1 Bottle    1 drop QID    Keratitis       * cycloSPORINE 0.05 % ophthalmic emulsion    RESTASIS    3 Box    Place 1 drop into both eyes 2 times daily    Dry eyes, bilateral, Limbal stem cell deficiency, bilateral       * cycloSPORINE 0.05 % ophthalmic emulsion    RESTASIS    30 each    Place 1 drop into both eyes 2 times daily    Limbal stem cell deficiency, bilateral, Dry eyes, bilateral       erythromycin ophthalmic ointment    ROMYCIN    3.5 g    Apply 1/2\" strip to left eye three times a day    Postoperative eye state       fluorometholone 0.1 % ophthalmic susp    FML LIQUIFILM    15 mL    Place 1 drop into both eyes 4 times daily    Keratitis       HYDROcodone-acetaminophen 5-325 MG per tablet    NORCO    6 " tablet    Take 1 tablet by mouth every 4 hours as needed for moderate to severe pain        neomycin-polymyxin-dexamethasone 3.5-33876-5.1 Susp ophthalmic susp    MAXITROL    1 Bottle    Place 1 drop into the right eye 3 times daily One drop to operated eye(s) three times daily for 10 days.    Postoperative eye state       ondansetron 4 MG ODT tab    ZOFRAN ODT    10 tablet    Take 1 tablet (4 mg) by mouth every 8 hours as needed        prednisoLONE acetate 1 % ophthalmic susp    PRED FORTE    5 mL    Place 1 drop into both eyes 2 times daily    Limbal stem cell deficiency, bilateral, Abrasion of conjunctiva, unspecified laterality, initial encounter       SUMAtriptan 100 MG tablet    IMITREX     at onset of headache    Throat pain       trimethoprim-polymyxin b ophthalmic solution    POLYTRIM    1 Bottle    Place 1 drop into both eyes 4 times daily    Keratitis       TYLENOL PO      Take by mouth every 4 hours as needed        * Notice:  This list has 6 medication(s) that are the same as other medications prescribed for you. Read the directions carefully, and ask your doctor or other care provider to review them with you.

## 2017-12-20 ENCOUNTER — OFFICE VISIT (OUTPATIENT)
Dept: OPHTHALMOLOGY | Facility: CLINIC | Age: 22
End: 2017-12-20
Attending: OPHTHALMOLOGY
Payer: COMMERCIAL

## 2017-12-20 DIAGNOSIS — H16.423 PANNUS (CORNEAL), BILATERAL: ICD-10-CM

## 2017-12-20 DIAGNOSIS — H18.893 LIMBAL STEM CELL DEFICIENCY, BILATERAL: Primary | ICD-10-CM

## 2017-12-20 DIAGNOSIS — H11.133: ICD-10-CM

## 2017-12-20 DIAGNOSIS — H16.9 KERATITIS: ICD-10-CM

## 2017-12-20 DIAGNOSIS — H04.123 DRY EYES, BILATERAL: ICD-10-CM

## 2017-12-20 PROCEDURE — 99213 OFFICE O/P EST LOW 20 MIN: CPT | Mod: ZF

## 2017-12-20 ASSESSMENT — VISUAL ACUITY
OS_PH_SC: 20/30
METHOD: SNELLEN - LINEAR
OS_SC: 20/40
OD_SC: 20/60

## 2017-12-20 ASSESSMENT — TONOMETRY
OS_IOP_MMHG: 12
OD_IOP_MMHG: 12
IOP_METHOD: ICARE

## 2017-12-20 ASSESSMENT — CONF VISUAL FIELD
OD_NORMAL: 1
OS_NORMAL: 1

## 2017-12-20 NOTE — PROGRESS NOTES
CC: f/up for LSCD OU    HPI: 22 year old [place]ym[place] female s/p eyelid surgery OD 6/13/17 here for f/u of limbal stem cell deficiency.     She notes improving comfort in both eyes, still complains of mild burning.    Previously using:  PFATs Q1hr  Restasis BID both eyes      Assessment and Plan:      1. 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 Baseline slit lamp photos 11/2015.   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 Mac   - continue Preservative free artificial tears Q1H both eyes (explained that these are OTC)   - continue Restasis OU twice a day    - punctal plug lower lid both eyes present   - minimize all ocular surface toxicity   - patient may ultimately need limbal stem cell transplant OD, could consider KLAL vs SLET with systemic immunosuppression    RTC 2 months    MIGUEL Roman  Cornea fellow      ~~~~~~~~~~~~~~~~~~~~~~~~~~~~~~~~~~~~~~~~~~~~~~~~~~~~~~~~~~~~~~~~    Complete documentation of historical and exam elements from today's encounter can be found in the full encounter summary report (not reduplicated in this progress note). I personally obtained the chief complaint(s) and history of present illness.  I confirmed and edited as necessary the review of systems, past medical/surgical history, family history, social history, and examination findings as documented by others; and I examined the patient myself. I personally reviewed the relevant tests, images, and reports as documented above. I formulated and edited as necessary the assessment and plan and discussed the findings and management plan with the patient and family.    Rey Gonzalez MD    I personally spent great than 40min  with the patient, of which >50% of the time was spent face to face with the patient, counseling and coordinating care with the patient. We discussed the complexity of her diagnosis, the need for further information prior to proceeding with yet another surgery, and the unknown prognosis for the patient at this time. Discussed KLAL and immunosuppression.    Rey Gonzalez MD

## 2017-12-20 NOTE — MR AVS SNAPSHOT
After Visit Summary   12/20/2017    Chloe Cleaning    MRN: [id number]           Patient Information     Date Of Birth          1995        Visit Information        Provider Department      12/20/2017 9:00 AM Rey Gonzalez MD; [facility]        Today's Diagnoses     Limbal stem cell deficiency, bilateral - Both Eyes    -  1    Conjunctival melanosis, bilateral - Both Eyes        Dry eyes, bilateral - Both Eyes        Pannus (corneal), bilateral - Both Eyes        Keratitis           Follow-ups after your visit        Follow-up notes from your care team     Return in about 4 weeks (around 1/17/2018) for Follow up vision pressure OU.      Your next 10 appointments already scheduled     Feb 21, 2018  9:00 AM CST   RETURN CORNEA with Rey Gonzalez MD   Eye Clinic ([facility])    [address]  [address]  [address]  [facility] [id number] [phone]              Who to contact     Please call your clinic at [phone] to:    Ask questions about your health    Make or cancel appointments    Discuss your medicines    Learn about your test results    Speak to your doctor   If you have compliments or concerns about an experience at your clinic, or if you wish to file a complaint, please contact [facility] Patient Relations at [phone] or email us at [email or web address]         Additional Information About Your Visit        MyChart Information     BitAnimatet is an electronic gateway that provides easy, online access to your medical records. With KeepGo, you can request a clinic appointment, read your test results, renew a prescription or communicate with your care team.     To sign up for BitAnimatet visit the website at [email or web address]   You will be asked to enter the access code listed below, as well as some personal information. Please follow the directions to create your  username and password.     Your access code is: [email or web address]  Expires: 2018  6:30 AM     Your access code will  in 90 days. If you need help or a new code, please contact your [facility] or call [phone] for assistance.        Care EveryWhere ID     This is your Care EveryWhere ID. This could be used by other organizations to access your [place] medical records  [id number]         Blood Pressure from Last 3 Encounters:   17 112/62   17 121/75   17 107/69    Weight from Last 3 Encounters:   17 68 kg (150 lb)   17 68 kg (150 lb)   17 67 kg (147 lb 12.8 oz)              Today, you had the following     No orders found for display       Primary Care Provider Fax #    Physician No Ref-Primary [phone]       No address on file        Equal Access to Services     DYLON LOU : Hadii teofilo anguloo Soradha, waaxda luqadaha, qaybta kaalmada adedeonyada, eliane de jesus . So [facility] [phone].    ATENCIÓN: Si habla español, tiene a de la cruz disposición servicios gratuitos de asistencia lingüística. Llame al [phone].    We comply with applicable federal civil rights laws and [place] laws. We do not discriminate on the basis of race, color, national origin, age, disability, sex, sexual orientation, or gender identity.            Thank you!     Thank you for choosing EYE CLINIC  for your care. Our goal is always to provide you with excellent care. Hearing back from our patients is one way we can continue to improve our services. Please take a few minutes to complete the written survey that you may receive in the mail after your visit with us. Thank you!             Your Updated Medication List - Protect others around you: Learn how to safely use, store and throw away your medicines at www.disposemymeds.org.          This list is accurate as of: 17 11:09 AM.  Always use your most recent med list.                 "   Brand Name Dispense Instructions for use Diagnosis    * carboxymethylcellulose 1 % ophthalmic solution    CELLUVISC/REFRESH LIQUIGEL    90 each    Place 1 drop into both eyes 4 times daily Dispose of dropperette within 24 hours after opening or if the tip is contaminated.    Dry eyes, bilateral, Limbal stem cell deficiency, bilateral, Pannus (corneal), bilateral       * carboxymethylcellulose 1 % ophthalmic solution    CELLUVISC/REFRESH LIQUIGEL    90 each    Place 1 drop into both eyes every hour (while awake) Dispose of dropperette within 24 hours after opening or if the tip is contaminated.    Limbal stem cell deficiency, bilateral, Dry eyes, bilateral       * carboxymethylcellulose 0.5 % Soln ophthalmic solution    carboxymethylcellulose sodium    70 each    Place 1 drop into both eyes 6 times daily    Eyelid retraction or lag, Limbal stem cell deficiency, bilateral       * Carboxymethylcellulose Sod PF 1 % ophthalmic solution    CELLUVISC/REFRESH LIQUIGEL    90 each    Place 1 drop into both eyes every hour (while awake) Dispose of dropperette within 24 hours after opening or if the tip is contaminated.    Limbal stem cell deficiency, bilateral, Dry eyes, bilateral       cephALEXin 500 MG capsule    KEFLEX    20 capsule    Take 1 capsule (500 mg) by mouth 3 times daily    Preseptal cellulitis of right eye       ciprofloxacin 0.3 % ophthalmic solution    CILOXAN    1 Bottle    1 drop QID    Keratitis       * cycloSPORINE 0.05 % ophthalmic emulsion    RESTASIS    3 Box    Place 1 drop into both eyes 2 times daily    Dry eyes, bilateral, Limbal stem cell deficiency, bilateral       * cycloSPORINE 0.05 % ophthalmic emulsion    RESTASIS    30 each    Place 1 drop into both eyes 2 times daily    Limbal stem cell deficiency, bilateral, Dry eyes, bilateral       erythromycin ophthalmic ointment    ROMYCIN    3.5 g    Apply 1/2\" strip to left eye three times a day    Postoperative eye state       fluorometholone 0.1 " % ophthalmic susp    FML LIQUIFILM    15 mL    Place 1 drop into both eyes 4 times daily    Keratitis       HYDROcodone-acetaminophen 5-325 MG per tablet    NORCO    6 tablet    Take 1 tablet by mouth every 4 hours as needed for moderate to severe pain        neomycin-polymyxin-dexamethasone 3.5-61784-6.1 Susp ophthalmic susp    MAXITROL    1 Bottle    Place 1 drop into the right eye 3 times daily One drop to operated eye(s) three times daily for 10 days.    Postoperative eye state       prednisoLONE acetate 1 % ophthalmic susp    PRED FORTE    5 mL    Place 1 drop into both eyes 2 times daily    Limbal stem cell deficiency, bilateral, Abrasion of conjunctiva, unspecified laterality, initial encounter       SUMAtriptan 100 MG tablet    IMITREX     at onset of headache    Throat pain       trimethoprim-polymyxin b ophthalmic solution    POLYTRIM    1 Bottle    Place 1 drop into both eyes 4 times daily    Keratitis       TYLENOL PO      Take by mouth every 4 hours as needed        * Notice:  This list has 6 medication(s) that are the same as other medications prescribed for you. Read the directions carefully, and ask your doctor or other care provider to review them with you.

## 2017-12-20 NOTE — NURSING NOTE
Chief Complaints and History of Present Illnesses   Patient presents with     Follow Up For     1 month follow up      HPI    Affected eye(s):  Both   Symptoms:     No Dryness   No itching   Burning   No photophobia         Do you have eye pain now?:  No      Comments:  1 month follow up  restasis bid BE  Refresh q1h BE  Leandra Guzman COA 9:29 AM December 20, 2017

## 2018-02-21 ENCOUNTER — OFFICE VISIT (OUTPATIENT)
Dept: OPHTHALMOLOGY | Facility: CLINIC | Age: 23
End: 2018-02-21
Attending: OPHTHALMOLOGY
Payer: COMMERCIAL

## 2018-02-21 DIAGNOSIS — H04.123 DRY EYES, BILATERAL: ICD-10-CM

## 2018-02-21 DIAGNOSIS — H11.133: ICD-10-CM

## 2018-02-21 DIAGNOSIS — H18.893 LIMBAL STEM CELL DEFICIENCY, BILATERAL: Primary | ICD-10-CM

## 2018-02-21 PROCEDURE — 68761 CLOSE TEAR DUCT OPENING: CPT | Mod: ZF | Performed by: OPHTHALMOLOGY

## 2018-02-21 PROCEDURE — G0463 HOSPITAL OUTPT CLINIC VISIT: HCPCS | Mod: ZF

## 2018-02-21 ASSESSMENT — REFRACTION_WEARINGRX
OD_AXIS: 35
OD_SPHERE: -1.50
OS_SPHERE: -1.50
OS_CYLINDER: +0.75
OS_AXIS: 100
OD_CYLINDER: +1.00

## 2018-02-21 ASSESSMENT — VISUAL ACUITY
METHOD: SNELLEN - LINEAR
OS_SC+: -2
OS_SC: 20/40
OD_SC: 20/50
OD_PH_SC: 20/50+2

## 2018-02-21 ASSESSMENT — TONOMETRY
OS_IOP_MMHG: 12
OD_IOP_MMHG: 12
IOP_METHOD: ICARE

## 2018-02-21 NOTE — PROGRESS NOTES
CC: f/up for LSCD OU    HPI: 22 year old [place]ore[place] female s/p eyelid surgery OD 6/13/17 here for f/u of limbal stem cell deficiency.      Interval history: patient reports burning sensation in both eyes when focusing. She additionally notes photophobia both eyes. Vision has been stable. She reports she was feeling better controlled until 2 weeks ago when these symptoms began.       Previously using:  PFATs four times a day both eyes   Restasis BID both eyes      Assessment and Plan:      1. 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 Baseline slit lamp photos 11/2015.   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 Mac   - Punctal plug missing on examination today left lower eyelid (replaced)   - increase Preservative free artificial tears Q1H both eyes   -Consider starting refresh pm ointment at bedtime both eyes    - continue Restasis OU twice a day    - patient may ultimately need limbal stem cell transplant OD, could consider KLAL vs SLET with systemic immunosuppression    RTC 3 months    Dee Glover MD  Ophthalmology PGY3       ~~~~~~~~~~~~~~~~~~~~~~~~~~~~~~~~~~~~~~~~~~~~~~~~~~~~~~~~~~~~~~~~    Complete documentation of historical and exam elements from today's encounter can be found in the full encounter summary report (not reduplicated in this progress note). I personally obtained the chief complaint(s) and history of present illness.  I confirmed and edited as necessary the review of systems, past medical/surgical history, family history, social history, and examination findings as documented by others; and I examined the patient myself. I personally reviewed the  relevant tests, images, and reports as documented above. I formulated and edited as necessary the assessment and plan and discussed the findings and management plan with the patient and family.    I was present for the entire procedure.    Rey Gonzalez MD      I personally spent great than 40min with the patient, of which >50% of the time was spent face to face with the patient, counseling and coordinating care with the patient. We discussed the complexity of her diagnosis, the need for further information prior to proceeding with yet another surgery, and the unknown prognosis for the patient at this time. Discussed KLAL and immunosuppression.    Rey Gonzalez MD

## 2018-02-21 NOTE — MR AVS SNAPSHOT
After Visit Summary   2018    Chloe Cleaning    MRN: [id number]           Patient Information     Date Of Birth          1995        Visit Information        Provider Department      2018 8:45 AM Rey Gonzalez MD; [facility]        Today's Diagnoses     Limbal stem cell deficiency, bilateral - Both Eyes    -  1    Conjunctival melanosis, bilateral - Both Eyes        Dry eyes, bilateral - Both Eyes           Follow-ups after your visit        Follow-up notes from your care team     Return in about 3 months (around 2018) for Follow Up.      Your next 10 appointments already scheduled     2018  2:00 PM CST   [facility]   Eye Clinic ([facility])    [place] [address]  [facility] [address]   [phone]            May 21, 2018  9:00 AM CDT   RETURN CORNEA with Rey Gonzalez MD   Eye Clinic ([facility])    [address]  [address] [address]  [facility] [address]   [phone]              Who to contact     Please call your clinic at [phone] to:    Ask questions about your health    Make or cancel appointments    Discuss your medicines    Learn about your test results    Speak to your doctor            Additional Information About Your Visit        MyChart Information     Zufflet is an electronic gateway that provides easy, online access to your medical records. With Panelfly, you can request a clinic appointment, read your test results, renew a prescription or communicate with your care team.     To sign up for Zufflet visit the website at [email or web address]   You will be asked to enter the access code listed below, as well as some personal information. Please follow the directions to create your username and password.     Your access code is: [email or web address]  Expires: 2018  6:30 AM     Your access code will   in 90 days. If you need help or a new code, please contact your [facility] or call [phone] for assistance.        Care EveryWhere ID     This is your Care EveryWhere ID. This could be used by other organizations to access your [place] medical records  [id number]         Blood Pressure from Last 3 Encounters:   11/19/17 112/62   06/08/17 121/75   05/11/17 107/69    Weight from Last 3 Encounters:   06/08/17 68 kg (150 lb)   05/11/17 68 kg (150 lb)   05/08/17 67 kg (147 lb 12.8 oz)              We Performed the Following     Punctal Closure, Plugs          Today's Medication Changes          These changes are accurate as of 2/21/18 10:15 AM.  If you have any questions, ask your nurse or doctor.               Start taking these medicines.        Dose/Directions    hypromellose 0.3 % Gel ophthalmic gel   Commonly known as:  GENTEAL   Used for:  Limbal stem cell deficiency, bilateral   Started by:  Rey Gonzalez MD        Dose:  2 drop   Place 0.1 g (2 drops) into both eyes At Bedtime Apply approximately a half inch ribbon of ointment to the inside of your lower lids. Warning, application of the ointment to your eyes will cause temporary blurring of your vision from the ointment coating your eye. Please apply right before bedtime.   Quantity:  10 g   Refills:  11         These medicines have changed or have updated prescriptions.        Dose/Directions    Carboxymethylcellulose Sod PF 1 % ophthalmic solution   Commonly known as:  CELLUVISC/REFRESH LIQUIGEL   This may have changed:  Another medication with the same name was removed. Continue taking this medication, and follow the directions you see here.   Used for:  Limbal stem cell deficiency, bilateral, Dry eyes, bilateral   Changed by:  Rey Gonzalez MD        Dose:  1 drop   Place 1 drop into both eyes every hour (while awake) Dispose of dropperette within 24 hours after opening or if the tip is contaminated.    Quantity:  90 each   Refills:  4       cycloSPORINE 0.05 % ophthalmic emulsion   Commonly known as:  RESTASIS   This may have changed:  Another medication with the same name was removed. Continue taking this medication, and follow the directions you see here.   Used for:  Limbal stem cell deficiency, bilateral, Dry eyes, bilateral   Changed by:  Rey Gonzalez MD        Dose:  1 drop   Place 1 drop into both eyes 2 times daily   Quantity:  30 each   Refills:  3            Where to get your medicines      Some of these will need a paper prescription and others can be bought over the counter.  Ask your nurse if you have questions.     Bring a paper prescription for each of these medications     hypromellose 0.3 % Gel ophthalmic gel                Primary Care Provider Fax #    Physician No Ref-Primary [phone]       No address on file        Equal Access to Services     DYLON LOU : Amie Bloom, waaxda luqadaha, qaybta kaalmada adedeonyamata, eliane haile. So [facility] [phone].    ATENCIÓN: Si habla español, tiene a de la cruz disposición servicios gratuitos de asistencia lingüística. Llame al [phone].    We comply with applicable federal civil rights laws and [place] laws. We do not discriminate on the basis of race, color, national origin, age, disability, sex, sexual orientation, or gender identity.            Thank you!     Thank you for choosing EYE CLINIC  for your care. Our goal is always to provide you with excellent care. Hearing back from our patients is one way we can continue to improve our services. Please take a few minutes to complete the written survey that you may receive in the mail after your visit with us. Thank you!             Your Updated Medication List - Protect others around you: Learn how to safely use, store and throw away your medicines at www.disposemymeds.org.          This list is accurate as of 2/21/18 10:15 AM.  Always use your  most recent med list.                   Brand Name Dispense Instructions for use Diagnosis    Carboxymethylcellulose Sod PF 1 % ophthalmic solution    CELLUVISC/REFRESH LIQUIGEL    90 each    Place 1 drop into both eyes every hour (while awake) Dispose of dropperette within 24 hours after opening or if the tip is contaminated.    Limbal stem cell deficiency, bilateral, Dry eyes, bilateral       cephALEXin 500 MG capsule    KEFLEX    20 capsule    Take 1 capsule (500 mg) by mouth 3 times daily    Preseptal cellulitis of right eye       cycloSPORINE 0.05 % ophthalmic emulsion    RESTASIS    30 each    Place 1 drop into both eyes 2 times daily    Limbal stem cell deficiency, bilateral, Dry eyes, bilateral       HYDROcodone-acetaminophen 5-325 MG per tablet    NORCO    6 tablet    Take 1 tablet by mouth every 4 hours as needed for moderate to severe pain        hypromellose 0.3 % Gel ophthalmic gel    GENTEAL    10 g    Place 0.1 g (2 drops) into both eyes At Bedtime Apply approximately a half inch ribbon of ointment to the inside of your lower lids. Warning, application of the ointment to your eyes will cause temporary blurring of your vision from the ointment coating your eye. Please apply right before bedtime.    Limbal stem cell deficiency, bilateral       SUMAtriptan 100 MG tablet    IMITREX     at onset of headache    Throat pain       TYLENOL PO      Take by mouth every 4 hours as needed

## 2018-02-21 NOTE — NURSING NOTE
Chief Complaints and History of Present Illnesses   Patient presents with     Follow Up For     2 month follow up  Limbal Stem Cell Deficiency, OD>OS     HPI    Affected eye(s):  Both   Symptoms:     No floaters   No flashes   No redness   No tearing   No itching         Do you have eye pain now?:  No      Comments:  Pt states vision is the same as last visit. Pt light sensitive in both eyes today. Pt also having dryness and burning in both eyes.    Tatianna CASTILLO February 21, 2018 9:06 AM

## 2018-02-26 ENCOUNTER — ALLIED HEALTH/NURSE VISIT (OUTPATIENT)
Dept: OPHTHALMOLOGY | Facility: CLINIC | Age: 23
End: 2018-02-26
Attending: OPHTHALMOLOGY
Payer: COMMERCIAL

## 2018-02-26 DIAGNOSIS — H18.893 LIMBAL STEM CELL DEFICIENCY, BILATERAL: Primary | ICD-10-CM

## 2018-02-26 PROCEDURE — 92015 DETERMINE REFRACTIVE STATE: CPT | Mod: ZF

## 2018-02-26 ASSESSMENT — REFRACTION_MANIFEST
OS_SPHERE: -3.00
OD_SPHERE: PLANO
OD_CYLINDER: +2.00
OS_CYLINDER: +1.00
OD_AXIS: 043
OS_AXIS: 078

## 2018-02-26 ASSESSMENT — REFRACTION_WEARINGRX
OD_AXIS: 35
OD_SPHERE: -1.50
OS_SPHERE: -1.50
OD_CYLINDER: +1.00
OS_AXIS: 100
OS_CYLINDER: +0.75

## 2018-02-26 ASSESSMENT — VISUAL ACUITY
OS_SC: 20/50
OD_SC: 20/80
METHOD: SNELLEN - LINEAR

## 2018-02-26 NOTE — MR AVS SNAPSHOT
After Visit Summary   2018    Chloe Cleaning    MRN: [id number]           Patient Information     Date Of Birth          1995        Visit Information        Provider Department      2018 1:45 PM LANGUAGE BAN; [facility] EYE TECH Eye Clinic        Today's Diagnoses     Limbal stem cell deficiency, bilateral - Both Eyes    -  1       Follow-ups after your visit        Your next 10 appointments already scheduled     May 21, 2018  9:00 AM CDT   RETURN CORNEA with Rey Gonzalez MD   Eye Clinic ([facility])    [place] [address]  [address] [address]  [facility] [address]   [phone]              Who to contact     Please call your clinic at [phone] to:    Ask questions about your health    Make or cancel appointments    Discuss your medicines    Learn about your test results    Speak to your doctor            Additional Information About Your Visit        MyChart Information     MagMet is an electronic gateway that provides easy, online access to your medical records. With GoAlbert, you can request a clinic appointment, read your test results, renew a prescription or communicate with your care team.     To sign up for MagMet visit the website at [email or web address]   You will be asked to enter the access code listed below, as well as some personal information. Please follow the directions to create your username and password.     Your access code is: [email or web address]  Expires: 2018  6:30 AM     Your access code will  in 90 days. If you need help or a new code, please contact your [facility] or call [phone] for assistance.        Care EveryWhere ID     This is your Care EveryWhere ID. This could be used by other organizations to access your [place] medical records  [id number]         Blood Pressure from Last 3 Encounters:   17 112/62   17 121/75   17 107/69    Weight from Last 3  Encounters:   06/08/17 68 kg (150 lb)   05/11/17 68 kg (150 lb)   05/08/17 67 kg (147 lb 12.8 oz)              Today, you had the following     No orders found for display       Primary Care Provider Fax #    Physician No Ref-Primary [phone]       No address on file        Equal Access to Services     ROSCOE ROBERTSAMY : Hadii aad ku hadshebao Sosheelaali, waaxda luqadaha, qaybta kaalmada marytyrone, waxmaria fernanda floridalma carmenrobert hernandezdeon nogueira neal . So [facility] [phone].    ATENCIÓN: Si habla español, tiene a de la cruz disposición servicios gratuitos de asistencia lingüística. Gillesame al [phone].    We comply with applicable federal civil rights laws and [place] laws. We do not discriminate on the basis of race, color, national origin, age, disability, sex, sexual orientation, or gender identity.            Thank you!     Thank you for choosing EYE CLINIC  for your care. Our goal is always to provide you with excellent care. Hearing back from our patients is one way we can continue to improve our services. Please take a few minutes to complete the written survey that you may receive in the mail after your visit with us. Thank you!             Your Updated Medication List - Protect others around you: Learn how to safely use, store and throw away your medicines at www.disposemymeds.org.          This list is accurate as of 2/26/18 11:59 PM.  Always use your most recent med list.                   Brand Name Dispense Instructions for use Diagnosis    Carboxymethylcellulose Sod PF 1 % ophthalmic solution    CELLUVISC/REFRESH LIQUIGEL    90 each    Place 1 drop into both eyes every hour (while awake) Dispose of dropperette within 24 hours after opening or if the tip is contaminated.    Limbal stem cell deficiency, bilateral, Dry eyes, bilateral       cephALEXin 500 MG capsule    KEFLEX    20 capsule    Take 1 capsule (500 mg) by mouth 3 times daily    Preseptal cellulitis of right eye       cycloSPORINE 0.05 % ophthalmic emulsion     RESTASIS    30 each    Place 1 drop into both eyes 2 times daily    Limbal stem cell deficiency, bilateral, Dry eyes, bilateral       HYDROcodone-acetaminophen 5-325 MG per tablet    NORCO    6 tablet    Take 1 tablet by mouth every 4 hours as needed for moderate to severe pain        hypromellose 0.3 % Gel ophthalmic gel    GENTEAL    10 g    Place 0.1 g (2 drops) into both eyes At Bedtime Apply approximately a half inch ribbon of ointment to the inside of your lower lids. Warning, application of the ointment to your eyes will cause temporary blurring of your vision from the ointment coating your eye. Please apply right before bedtime.    Limbal stem cell deficiency, bilateral       SUMAtriptan 100 MG tablet    IMITREX     at onset of headache    Throat pain       TYLENOL PO      Take by mouth every 4 hours as needed

## 2018-04-05 ENCOUNTER — HOSPITAL ENCOUNTER (EMERGENCY)
Facility: CLINIC | Age: 23
Discharge: HOME | End: 2018-04-06
Attending: EMERGENCY MEDICINE | Admitting: EMERGENCY MEDICINE
Payer: COMMERCIAL

## 2018-04-05 ENCOUNTER — APPOINTMENT (OUTPATIENT)
Dept: ULTRASOUND IMAGING | Facility: CLINIC | Age: 23
End: 2018-04-05
Attending: EMERGENCY MEDICINE
Payer: COMMERCIAL

## 2018-04-05 ENCOUNTER — APPOINTMENT (OUTPATIENT)
Dept: CT IMAGING | Facility: CLINIC | Age: 23
End: 2018-04-05
Attending: EMERGENCY MEDICINE
Payer: COMMERCIAL

## 2018-04-05 DIAGNOSIS — R10.11 ABDOMINAL PAIN, RIGHT UPPER QUADRANT: ICD-10-CM

## 2018-04-05 LAB
ALBUMIN SERPL-MCNC: 3.6 G/DL (ref 3.4–5)
ALP SERPL-CCNC: 63 U/L (ref 40–150)
ALT SERPL W P-5'-P-CCNC: 10 U/L (ref 0–50)
ANION GAP SERPL CALCULATED.3IONS-SCNC: 8 MMOL/L (ref 3–14)
AST SERPL W P-5'-P-CCNC: 10 U/L (ref 0–45)
BASOPHILS # BLD AUTO: 0 10E9/L (ref 0–0.2)
BASOPHILS NFR BLD AUTO: 0.2 %
BILIRUB SERPL-MCNC: 0.3 MG/DL (ref 0.2–1.3)
BUN SERPL-MCNC: 9 MG/DL (ref 7–30)
CALCIUM SERPL-MCNC: 8.2 MG/DL (ref 8.5–10.1)
CHLORIDE SERPL-SCNC: 107 MMOL/L (ref 94–109)
CO2 SERPL-SCNC: 23 MMOL/L (ref 20–32)
CREAT SERPL-MCNC: 0.88 MG/DL (ref 0.52–1.04)
DIFFERENTIAL METHOD BLD: ABNORMAL
EOSINOPHIL # BLD AUTO: 0 10E9/L (ref 0–0.7)
EOSINOPHIL NFR BLD AUTO: 0.3 %
ERYTHROCYTE [DISTWIDTH] IN BLOOD BY AUTOMATED COUNT: 16.6 % (ref 10–15)
GFR SERPL CREATININE-BSD FRML MDRD: 80 ML/MIN/1.7M2
GLUCOSE SERPL-MCNC: 89 MG/DL (ref 70–99)
HCG SERPL QL: NEGATIVE
HCT VFR BLD AUTO: 31.7 % (ref 35–47)
HGB BLD-MCNC: 10 G/DL (ref 11.7–15.7)
IMM GRANULOCYTES # BLD: 0 10E9/L (ref 0–0.4)
IMM GRANULOCYTES NFR BLD: 0.2 %
LIPASE SERPL-CCNC: 93 U/L (ref 73–393)
LYMPHOCYTES # BLD AUTO: 1.7 10E9/L (ref 0.8–5.3)
LYMPHOCYTES NFR BLD AUTO: 29.3 %
MCH RBC QN AUTO: 24 PG (ref 26.5–33)
MCHC RBC AUTO-ENTMCNC: 31.5 G/DL (ref 31.5–36.5)
MCV RBC AUTO: 76 FL (ref 78–100)
MONOCYTES # BLD AUTO: 0.6 10E9/L (ref 0–1.3)
MONOCYTES NFR BLD AUTO: 9.8 %
NEUTROPHILS # BLD AUTO: 3.5 10E9/L (ref 1.6–8.3)
NEUTROPHILS NFR BLD AUTO: 60.2 %
NRBC # BLD AUTO: 0 10*3/UL
NRBC BLD AUTO-RTO: 0 /100
PLATELET # BLD AUTO: 266 10E9/L (ref 150–450)
POTASSIUM SERPL-SCNC: 3.4 MMOL/L (ref 3.4–5.3)
PROT SERPL-MCNC: 7.6 G/DL (ref 6.8–8.8)
RBC # BLD AUTO: 4.17 10E12/L (ref 3.8–5.2)
SODIUM SERPL-SCNC: 138 MMOL/L (ref 133–144)
WBC # BLD AUTO: 5.8 10E9/L (ref 4–11)

## 2018-04-05 PROCEDURE — 25000128 H RX IP 250 OP 636: Performed by: EMERGENCY MEDICINE

## 2018-04-05 PROCEDURE — 80053 COMPREHEN METABOLIC PANEL: CPT | Performed by: EMERGENCY MEDICINE

## 2018-04-05 PROCEDURE — 74177 CT ABD & PELVIS W/CONTRAST: CPT

## 2018-04-05 PROCEDURE — 84703 CHORIONIC GONADOTROPIN ASSAY: CPT | Performed by: EMERGENCY MEDICINE

## 2018-04-05 PROCEDURE — 96375 TX/PRO/DX INJ NEW DRUG ADDON: CPT

## 2018-04-05 PROCEDURE — 96361 HYDRATE IV INFUSION ADD-ON: CPT

## 2018-04-05 PROCEDURE — 25000125 ZZHC RX 250: Performed by: EMERGENCY MEDICINE

## 2018-04-05 PROCEDURE — 96374 THER/PROPH/DIAG INJ IV PUSH: CPT

## 2018-04-05 PROCEDURE — 76705 ECHO EXAM OF ABDOMEN: CPT

## 2018-04-05 PROCEDURE — 85025 COMPLETE CBC W/AUTO DIFF WBC: CPT | Performed by: EMERGENCY MEDICINE

## 2018-04-05 PROCEDURE — 83690 ASSAY OF LIPASE: CPT | Performed by: EMERGENCY MEDICINE

## 2018-04-05 PROCEDURE — 99285 EMERGENCY DEPT VISIT HI MDM: CPT | Mod: 25

## 2018-04-05 RX ORDER — ONDANSETRON 2 MG/ML
4 INJECTION INTRAMUSCULAR; INTRAVENOUS ONCE
Status: COMPLETED | OUTPATIENT
Start: 2018-04-05 | End: 2018-04-05

## 2018-04-05 RX ORDER — MORPHINE SULFATE 4 MG/ML
4 INJECTION, SOLUTION INTRAMUSCULAR; INTRAVENOUS ONCE
Status: COMPLETED | OUTPATIENT
Start: 2018-04-05 | End: 2018-04-06

## 2018-04-05 RX ADMIN — SODIUM CHLORIDE 1000 ML: 9 INJECTION, SOLUTION INTRAVENOUS at 22:25

## 2018-04-05 RX ADMIN — ONDANSETRON 4 MG: 2 INJECTION INTRAMUSCULAR; INTRAVENOUS at 22:25

## 2018-04-05 RX ADMIN — FAMOTIDINE 20 MG: 10 INJECTION, SOLUTION INTRAVENOUS at 22:25

## 2018-04-05 ASSESSMENT — ENCOUNTER SYMPTOMS
FLANK PAIN: 1
VOMITING: 0
ABDOMINAL PAIN: 1
CONSTIPATION: 1
NAUSEA: 1
DIARRHEA: 0

## 2018-04-05 NOTE — ED AVS SNAPSHOT
Emergency Department    [address] [place] [address]    Phone:  [phone]    Fax:  [phone]                                       Chloe Cleaning   MRN: [id number]    Department:   Emergency Department   Date of Visit:  4/5/2018           After Visit Summary Signature Page     I have received my discharge instructions, and my questions have been answered. I have discussed any challenges I see with this plan with the nurse or doctor.    ..........................................................................................................................................  Patient/Patient Representative Signature      ..........................................................................................................................................  Patient Representative Print Name and Relationship to Patient    ..................................................               ................................................  Date                                            Time    ..........................................................................................................................................  Reviewed by Signature/Title    ...................................................              ..............................................  Date                                                            Time

## 2018-04-05 NOTE — ED AVS SNAPSHOT
Emergency Department    [address] [facility] [address]    Phone:  [phone]    Fax:  [phone]                                       Chloe Cleaning   MRN: [id number]    Department:   Emergency Department   Date of Visit:  4/5/2018           Patient Information     Date Of Birth          1995        Your diagnoses for this visit were:     Abdominal pain, right upper quadrant        You were seen by Nato Patel MD.      Follow-up Information     Call Your Primary Care Doctor.        Follow up with  Emergency Department.    Specialty:  EMERGENCY MEDICINE    Why:  As needed, If symptoms worsen    Contact information:    [address] [facility] [address] [phone]        Discharge Instructions         *Abdominal Pain,    The exact cause of your abdominal (stomach) pain is not certain. This does not mean that this is something to worry about, or the right tests were not done. Everyone likes to know the exact cause of the problem, but sometimes with abdominal pain, there is no clear-cut cause, and this could be a good thing. The good news is that your symptoms can be treated, and you will feel better.   Your condition does not seem serious now; however, sometimes the signs of a serious problem may take more time to appear. For this reason, it is important for you to watch for any new symptoms, problems, or worsening of your condition.  Over the next few days, the abdominal pain may come and go, or be continuous. Other common symptoms can include nausea and vomiting. Sometimes it can be difficult to tell if you feel nauseous, you may just feel bad and not associate that feeling with nausea. Constipation, diarrhea, and a fever may go along with the pain.  The pain may continue even if treated correctly over the following days. Depending on how things go, sometimes the cause can become clear and may require further or different treatment. Additional evaluations,  medications, or tests may be needed.  Home care  Your health care provider may prescribe medications for pain, symptoms, or an infection.  Follow the health care provider's instructions for taking these medications.  General care    Rest until your next exam. No strenuous activities.    Try to find positions that ease discomfort. A small pillow placed on the abdomen may help relieve pain.    Something warm on your abdomen (such as a heating pad) may help, but be careful not to burn yourself.  Diet    Do not force yourself to eat, especially if having cramps, vomiting, or diarrhea.    Water is important so you do not get dehydrated. Soup may also be good. Sports drinks may also help, especially if they are not too acidic. Make sure you don't drink sugary drinks as this can make things worse. Take liquids in small amounts. Do not guzzle them.    Caffeine sometimes makes the pain and cramping worse.    Avoid dairy products if you have vomiting or diarrhea.    Don't eat large amounts at a time. Wait a few minutes between bites.    Eat a diet low in fiber (called a low-residue diet). Foods allowed include refined breads, white rice, fruit and vegetable juices without pulp, tender meats. These foods will pass more easily through the intestine.    Avoid fried or fatty foods, dairy, alcohol and spicy foods until your symptoms go away.  Follow-up care  Follow up with your health care provider as instructed, or if your pain does not begin to improve in the next 24 hours.  When to seek medical care  Seek prompt medical care if any of the following occur:    Pain gets worse or moves to the right lower abdomen    New or worsening vomiting or diarrhea    Swelling of the abdomen    Unable to pass stool for more than three days    New fever over 101  F (38.3 C), or rising fever    Blood in vomit or bowel movements (dark red or black color)    Jaundice (yellow color of eyes and skin)    Weakness, dizziness    Chest, arm, back, neck  or jaw pain    Unexpected vaginal bleeding or missed period  Call 911  Call emergency services if any of the following occur:    Trouble breathing    Confusion    Fainting or loss of consciousness    Rapid heart rate    Seizure    3406-2950 Ariane Butler, [address] [facility], [place] [address]. All rights reserved. This information is not intended as a substitute for professional medical care. Always follow your healthcare professional's instructions.          Your next 10 appointments already scheduled     May 21, 2018  9:00 AM CDT   RETURN CORNEA with Rey Gonzalez MD   Eye Clinic ([facility])    [address]  [facility] [address] [phone]              24 Hour Appointment Hotline       To make an appointment at any [facility], call [phone] ([phone]). If you don't have a family doctor or clinic, we will help you find one. [facility] are conveniently located to serve the needs of you and your family.             Review of your medicines      START taking        Dose / Directions Last dose taken    omeprazole 20 MG CR capsule   Commonly known as:  priLOSEC   Dose:  20 mg   Quantity:  15 capsule        Take 1 capsule (20 mg) by mouth daily   Refills:  0          Our records show that you are taking the medicines listed below. If these are incorrect, please call your family doctor or clinic.        Dose / Directions Last dose taken    Carboxymethylcellulose Sod PF 1 % ophthalmic solution   Commonly known as:  CELLUVISC/REFRESH LIQUIGEL   Dose:  1 drop   Quantity:  90 each        Place 1 drop into both eyes every hour (while awake) Dispose of dropperette within 24 hours after opening or if the tip is contaminated.   Refills:  4        cephALEXin 500 MG capsule   Commonly known as:  KEFLEX   Dose:  500 mg   Quantity:  20 capsule        Take 1 capsule (500 mg) by mouth 3 times daily   Refills:  0        cycloSPORINE  0.05 % ophthalmic emulsion   Commonly known as:  RESTASIS   Dose:  1 drop   Quantity:  30 each        Place 1 drop into both eyes 2 times daily   Refills:  3        HYDROcodone-acetaminophen 5-325 MG per tablet   Commonly known as:  NORCO   Dose:  1 tablet   Quantity:  6 tablet        Take 1 tablet by mouth every 4 hours as needed for moderate to severe pain   Refills:  0        hypromellose 0.3 % Gel ophthalmic gel   Commonly known as:  GENTEAL   Dose:  2 drop   Quantity:  10 g        Place 0.1 g (2 drops) into both eyes At Bedtime Apply approximately a half inch ribbon of ointment to the inside of your lower lids. Warning, application of the ointment to your eyes will cause temporary blurring of your vision from the ointment coating your eye. Please apply right before bedtime.   Refills:  11        SUMAtriptan 100 MG tablet   Commonly known as:  IMITREX        at onset of headache   Refills:  3        TYLENOL PO        Take by mouth every 4 hours as needed   Refills:  0                Prescriptions were sent or printed at these locations (1 Prescription)                   Other Prescriptions                Printed at Department/Unit printer (1 of 1)         omeprazole (PRILOSEC) 20 MG CR capsule                Procedures and tests performed during your visit     Abdomen US, limited (RUQ only)    CBC with platelets + differential    CT Abdomen Pelvis w Contrast    Comprehensive metabolic panel    HCG QUALitative pregnancy (blood)    Lipase    UA reflex to Microscopic      Orders Needing Specimen Collection     None      Pending Results     Date and Time Order Name Status Description    4/5/2018 2304 CT Abdomen Pelvis w Contrast Preliminary     4/5/2018 2215 Abdomen US, limited (RUQ only) Preliminary             Pending Culture Results     No orders found for last 3 day(s).            Pending Results Instructions     If you had any lab results that were not finalized at the time of your Discharge, you can call the  ED Lab Result RN at [phone]. You will be contacted by this team for any positive Lab results or changes in treatment. The nurses are available 7 days a week from 10A to 6:30P.  You can leave a message 24 hours per day and they will return your call.        Test Results From Your Hospital Stay        4/5/2018 10:33 PM      Component Results     Component Value Ref Range & Units Status    WBC 5.8 4.0 - 11.0 10e9/L Final    RBC Count 4.17 3.8 - 5.2 10e12/L Final    Hemoglobin 10.0 (L) 11.7 - 15.7 g/dL Final    Hematocrit 31.7 (L) 35.0 - 47.0 % Final    MCV 76 (L) 78 - 100 fl Final    MCH 24.0 (L) 26.5 - 33.0 pg Final    MCHC 31.5 31.5 - 36.5 g/dL Final    RDW 16.6 (H) 10.0 - 15.0 % Final    Platelet Count 266 150 - 450 10e9/L Final    Diff Method Automated Method  Final    % Neutrophils 60.2 % Final    % Lymphocytes 29.3 % Final    % Monocytes 9.8 % Final    % Eosinophils 0.3 % Final    % Basophils 0.2 % Final    % Immature Granulocytes 0.2 % Final    Nucleated RBCs 0 0 /100 Final    Absolute Neutrophil 3.5 1.6 - 8.3 10e9/L Final    Absolute Lymphocytes 1.7 0.8 - 5.3 10e9/L Final    Absolute Monocytes 0.6 0.0 - 1.3 10e9/L Final    Absolute Eosinophils 0.0 0.0 - 0.7 10e9/L Final    Absolute Basophils 0.0 0.0 - 0.2 10e9/L Final    Abs Immature Granulocytes 0.0 0 - 0.4 10e9/L Final    Absolute Nucleated RBC 0.0  Final         4/5/2018 10:58 PM      Component Results     Component Value Ref Range & Units Status    Sodium 138 133 - 144 mmol/L Final    Potassium 3.4 3.4 - 5.3 mmol/L Final    Chloride 107 94 - 109 mmol/L Final    Carbon Dioxide 23 20 - 32 mmol/L Final    Anion Gap 8 3 - 14 mmol/L Final    Glucose 89 70 - 99 mg/dL Final    Urea Nitrogen 9 7 - 30 mg/dL Final    Creatinine 0.88 0.52 - 1.04 mg/dL Final    GFR Estimate 80 >60 mL/min/1.7m2 Final    Non  GFR Calc    GFR Estimate If Black >90 >60 mL/min/1.7m2 Final    African American GFR Calc    Calcium 8.2 (L) 8.5 - 10.1 mg/dL Final     Bilirubin Total 0.3 0.2 - 1.3 mg/dL Final    Albumin 3.6 3.4 - 5.0 g/dL Final    Protein Total 7.6 6.8 - 8.8 g/dL Final    Alkaline Phosphatase 63 40 - 150 U/L Final    ALT 10 0 - 50 U/L Final    AST 10 0 - 45 U/L Final         4/5/2018 10:56 PM      Component Results     Component Value Ref Range & Units Status    Lipase 93 73 - 393 U/L Final         4/5/2018 10:58 PM      Component Results     Component Value Ref Range & Units Status    HCG Qualitative Serum Negative NEG^Negative Final    This test is for screening purposes.  Results should be interpreted along with   the clinical picture.  Confirmation testing is available if warranted by   ordering WSE227, HCG Quantitative Pregnancy.           4/5/2018 11:58 PM      Narrative     ULTRASOUND ABDOMEN LIMITED RIGHT UPPER QUADRANT  4/5/2018 11:49 PM     HISTORY: Right upper quadrant pain.    COMPARISON: 11/19/2017 - CT abdomen and pelvis.    FINDINGS: Normal hepatic echogenicity. No hepatic masses. The  gallbladder is unremarkable without gallstones, wall thickening or  pericholecystic fluid. No focal tenderness over the gallbladder. No  intra- or extrahepatic biliary dilatation. The common duct measures  0.3 cm in diameter. The right kidney has normal size and echogenicity,  measuring 9.1 cm in length. No right intrarenal collecting system  dilatation, calculi or masses. No free fluid in the upper right  hemiabdomen.        Impression     IMPRESSION:  1. Unremarkable appearance of the gallbladder and liver.  2. No biliary dilatation.         4/6/2018 12:18 AM      Narrative     CT ABDOMEN AND PELVIS WITH CONTRAST  4/6/2018 12:05 AM     HISTORY: Upper abdominal pain.    COMPARISON: 11/19/2017.    TECHNIQUE: Following the uneventful administration of 75 mL Isovue-370  intravenous contrast, helical sections were acquired from the top of  the diaphragm through the pubic symphysis. Coronal reconstructions  were generated. Radiation dose for this scan was reduced  using  automated exposure control, adjustment of the mA and/or kV according  to the patient's size, or iterative reconstruction technique.    FINDINGS:  Abdomen: The liver, spleen, pancreas, adrenal glands and kidneys are  unremarkable. The gallbladder is present. No enlarged lymph nodes or  free fluid in the upper abdomen.    Scan through the lower chest is unremarkable.    Pelvis: The small and large bowel are normal in caliber. The appendix  is unremarkable. No bowel wall thickening, pneumatosis or free  intraperitoneal gas. The uterus is present. No enlarged lymph nodes or  free fluid in the pelvis.        Impression     IMPRESSION: No cause of acute pain identified in the abdomen or  pelvis. The appendix is unremarkable.         4/6/2018 12:42 AM      Component Results     Component Value Ref Range & Units Status    Color Urine Light Yellow  Final    Appearance Urine Clear  Final    Glucose Urine Negative NEG^Negative mg/dL Final    Bilirubin Urine Negative NEG^Negative Final    Ketones Urine Negative NEG^Negative mg/dL Final    Specific Gravity Urine 1.021 1.003 - 1.035 Final    Blood Urine Large (A) NEG^Negative Final    pH Urine 7.0 5.0 - 7.0 pH Final    Protein Albumin Urine Negative NEG^Negative mg/dL Final    Urobilinogen mg/dL Normal 0.0 - 2.0 mg/dL Final    Nitrite Urine Negative NEG^Negative Final    Leukocyte Esterase Urine Negative NEG^Negative Final    Source Midstream Urine  Final    RBC Urine >182 (H) 0 - 2 /HPF Final    WBC Urine 2 0 - 5 /HPF Final                Clinical Quality Measure: Blood Pressure Screening     Your blood pressure was checked while you were in the emergency department today. The last reading we obtained was  BP: 114/80 . Please read the guidelines below about what these numbers mean and what you should do about them.  If your systolic blood pressure (the top number) is less than 120 and your diastolic blood pressure (the bottom number) is less than 80, then your blood  "pressure is normal. There is nothing more that you need to do about it.  If your systolic blood pressure (the top number) is 120-139 or your diastolic blood pressure (the bottom number) is 80-89, your blood pressure may be higher than it should be. You should have your blood pressure rechecked within a year by a primary care provider.  If your systolic blood pressure (the top number) is 140 or greater or your diastolic blood pressure (the bottom number) is 90 or greater, you may have high blood pressure. High blood pressure is treatable, but if left untreated over time it can put you at risk for heart attack, stroke, or kidney failure. You should have your blood pressure rechecked by a primary care provider within the next 4 weeks.  If your provider in the emergency department today gave you specific instructions to follow-up with your doctor or provider even sooner than that, you should follow that instruction and not wait for up to 4 weeks for your follow-up visit.        Thank you for choosing [facility]       Thank you for choosing [facility] for your care. Our goal is always to provide you with excellent care. Hearing back from our patients is one way we can continue to improve our services. Please take a few minutes to complete the written survey that you may receive in the mail after you visit with us. Thank you!        [employer] Information     Where's [employer] lets you send messages to your doctor, view your test results, renew your prescriptions, schedule appointments and more. To sign up, go to [email or web address] . Click on \"Log in\" on the left side of the screen, which will take you to the Welcome page. Then click on \"Sign up Now\" on the right side of the page.     You will be asked to enter the access code listed below, as well as some personal information. Please follow the directions to create your username and password.     Your access code is: [email or web address]  Expires: 5/8/2018  7:30 AM     Your access code will "  in 90 days. If you need help or a new code, please call your [facility] clinic or [phone].        Care EveryWhere ID     This is your Care EveryWhere ID. This could be used by other organizations to access your [facility] medical records  [id number]        Equal Access to Services     DYLON LOU : Amie Bloom, waaxmata luqadaha, qayboc kaalmamata nance, eliane haile. So [facility] [phone].    ATENCIÓN: Si habla español, tiene a de la cruz disposición servicios gratuitos de asistencia lingüística. Llame al [phone].    We comply with applicable federal civil rights laws and [place] laws. We do not discriminate on the basis of race, color, national origin, age, disability, sex, sexual orientation, or gender identity.            After Visit Summary       This is your record. Keep this with you and show to your community pharmacist(s) and doctor(s) at your next visit.

## 2018-04-06 VITALS
TEMPERATURE: 98.2 F | HEART RATE: 89 BPM | OXYGEN SATURATION: 100 % | RESPIRATION RATE: 16 BRPM | SYSTOLIC BLOOD PRESSURE: 114 MMHG | WEIGHT: 150.2 LBS | BODY MASS INDEX: 28.4 KG/M2 | DIASTOLIC BLOOD PRESSURE: 80 MMHG

## 2018-04-06 LAB
ALBUMIN UR-MCNC: NEGATIVE MG/DL
APPEARANCE UR: CLEAR
BILIRUB UR QL STRIP: NEGATIVE
COLOR UR AUTO: ABNORMAL
GLUCOSE UR STRIP-MCNC: NEGATIVE MG/DL
HGB UR QL STRIP: ABNORMAL
KETONES UR STRIP-MCNC: NEGATIVE MG/DL
LEUKOCYTE ESTERASE UR QL STRIP: NEGATIVE
NITRATE UR QL: NEGATIVE
PH UR STRIP: 7 PH (ref 5–7)
RBC #/AREA URNS AUTO: >182 /HPF (ref 0–2)
SOURCE: ABNORMAL
SP GR UR STRIP: 1.02 (ref 1–1.03)
UROBILINOGEN UR STRIP-MCNC: NORMAL MG/DL (ref 0–2)
WBC #/AREA URNS AUTO: 2 /HPF (ref 0–5)

## 2018-04-06 PROCEDURE — 25000128 H RX IP 250 OP 636: Performed by: EMERGENCY MEDICINE

## 2018-04-06 PROCEDURE — 81001 URINALYSIS AUTO W/SCOPE: CPT | Performed by: EMERGENCY MEDICINE

## 2018-04-06 PROCEDURE — 74177 CT ABD & PELVIS W/CONTRAST: CPT

## 2018-04-06 PROCEDURE — 25000125 ZZHC RX 250: Performed by: EMERGENCY MEDICINE

## 2018-04-06 RX ORDER — IOPAMIDOL 755 MG/ML
75 INJECTION, SOLUTION INTRAVASCULAR ONCE
Status: COMPLETED | OUTPATIENT
Start: 2018-04-06 | End: 2018-04-06

## 2018-04-06 RX ADMIN — MORPHINE SULFATE 4 MG: 4 INJECTION INTRAVENOUS at 00:28

## 2018-04-06 RX ADMIN — SODIUM CHLORIDE 63 ML: 9 INJECTION, SOLUTION INTRAVENOUS at 00:02

## 2018-04-06 RX ADMIN — IOPAMIDOL 75 ML: 755 INJECTION, SOLUTION INTRAVENOUS at 00:02

## 2018-04-06 NOTE — DISCHARGE INSTRUCTIONS
*Abdominal Pain,    The exact cause of your abdominal (stomach) pain is not certain. This does not mean that this is something to worry about, or the right tests were not done. Everyone likes to know the exact cause of the problem, but sometimes with abdominal pain, there is no clear-cut cause, and this could be a good thing. The good news is that your symptoms can be treated, and you will feel better.   Your condition does not seem serious now; however, sometimes the signs of a serious problem may take more time to appear. For this reason, it is important for you to watch for any new symptoms, problems, or worsening of your condition.  Over the next few days, the abdominal pain may come and go, or be continuous. Other common symptoms can include nausea and vomiting. Sometimes it can be difficult to tell if you feel nauseous, you may just feel bad and not associate that feeling with nausea. Constipation, diarrhea, and a fever may go along with the pain.  The pain may continue even if treated correctly over the following days. Depending on how things go, sometimes the cause can become clear and may require further or different treatment. Additional evaluations, medications, or tests may be needed.  Home care  Your health care provider may prescribe medications for pain, symptoms, or an infection.  Follow the health care provider's instructions for taking these medications.  General care    Rest until your next exam. No strenuous activities.    Try to find positions that ease discomfort. A small pillow placed on the abdomen may help relieve pain.    Something warm on your abdomen (such as a heating pad) may help, but be careful not to burn yourself.  Diet    Do not force yourself to eat, especially if having cramps, vomiting, or diarrhea.    Water is important so you do not get dehydrated. Soup may also be good. Sports drinks may also help, especially if they are not too acidic. Make sure you don't drink sugary  drinks as this can make things worse. Take liquids in small amounts. Do not guzzle them.    Caffeine sometimes makes the pain and cramping worse.    Avoid dairy products if you have vomiting or diarrhea.    Don't eat large amounts at a time. Wait a few minutes between bites.    Eat a diet low in fiber (called a low-residue diet). Foods allowed include refined breads, white rice, fruit and vegetable juices without pulp, tender meats. These foods will pass more easily through the intestine.    Avoid fried or fatty foods, dairy, alcohol and spicy foods until your symptoms go away.  Follow-up care  Follow up with your health care provider as instructed, or if your pain does not begin to improve in the next 24 hours.  When to seek medical care  Seek prompt medical care if any of the following occur:    Pain gets worse or moves to the right lower abdomen    New or worsening vomiting or diarrhea    Swelling of the abdomen    Unable to pass stool for more than three days    New fever over 101  F (38.3 C), or rising fever    Blood in vomit or bowel movements (dark red or black color)    Jaundice (yellow color of eyes and skin)    Weakness, dizziness    Chest, arm, back, neck or jaw pain    Unexpected vaginal bleeding or missed period  Call 911  Call emergency services if any of the following occur:    Trouble breathing    Confusion    Fainting or loss of consciousness    Rapid heart rate    Seizure    7721-7932 Ariane Butler, [address] [place] [address], [place] [address]. All rights reserved. This information is not intended as a substitute for professional medical care. Always follow your healthcare professional's instructions.

## 2018-04-06 NOTE — ED PROVIDER NOTES
History     Chief Complaint:  Abdominal Pain     History is limited due to language barrier. History translated via online .    LAWRENCE Cleaning is a 22 year old female who presents to the emergency department today for evaluation of abdominal pain. The patient reports that she began experiencing intermittent right sided abdominal and flank pain and cramping this morning, 4/5/18, that returned significantly stronger around 1900 tonight. She states that along with her pain she has had nausea and notes normal constipation. She denies any diarrhea or vomiting.    Allergies:  No Known Allergies     Medications:    Genteal  Celluvisc  Restasis  Norco  Keflex  Imitrex  Tylenol    Past Medical History:    Conjunctival melanosis, bilateral   Dry eye  Keratitis  Limbal stem cell deficiency  Pannus corneal, bilateral    Past Surgical History:    Repair retraction lid x2  Tarsorrhaphy x2    Family History:    Family history reviewed. No pertinent family history.    Social History:  The patient was accompanied to the ED by friend.  Smoking Status: Never Smoker  Smokeless Tobacco: Never Used  Alcohol Use: Negative  Marital Status:  Single      Review of Systems   Gastrointestinal: Positive for abdominal pain, constipation and nausea. Negative for diarrhea and vomiting.   Genitourinary: Positive for flank pain.   All other systems reviewed and are negative.    Physical Exam     Patient Vitals for the past 24 hrs:   BP Temp Temp src Pulse Resp SpO2 Weight   04/05/18 2159 - 98.2  F (36.8  C) Oral - - 100 % -   04/05/18 2153 114/80 98.2  F (36.8  C) Oral 89 18 - 68.1 kg (150 lb 3.2 oz)     Physical Exam  General: Appears well-developed and well-nourished.   Head: No signs of trauma.   CV: Normal rate and regular rhythm.    Resp: Effort normal and breath sounds normal. No respiratory distress.   GI: Soft. There is mild RUQ tenderness.  No rebound or guarding.  Negative najera's sign.  Normal bowel sounds.  No CVA  tenderness.  MSK: Normal range of motion. no edema. No Calf tenderness.  Neuro: The patient is alert and oriented.  Speech normal.  GCS 15  Skin: Skin is warm and dry. No rash noted.   Psych: normal mood and affect. behavior is normal.       Emergency Department Course     Imaging:  Radiology findings were communicated with the patient who voiced understanding of the findings.    CT Abdomen Pelvis w Contrast  No cause of acute pain identified in the abdomen or  pelvis. The appendix is unremarkable.  Reading per radiology    Abdomen US, limited (RUQ only)  1. Unremarkable appearance of the gallbladder and liver.  2. No biliary dilatation.  Reading per radiology    Laboratory:  Laboratory findings were communicated with the patient who voiced understanding of the findings.    UA Reflex to Microscopic: Blood large (A), RBC/HPF >182 (H), o/w WNL  CBC: WBC 5.8, HGB 10.0 (L),   CMP: Calcium 8.2 (L) o/w WNL (Creatinine 0.88)  Lipase: 93  HCG Qualitative Blood: negative    Interventions:  2225 Zofran 4 mg IV  2225 Pepcid 20 mg IV  2225 NS 1000 ml IV  0028 Morphine 4 mg IV    Emergency Department Course:    2206 Nursing notes and vitals reviewed.    2213 I performed an exam of the patient as documented above.     2222 IV was inserted and blood was drawn for laboratory testing, results above.    2333 The patient was sent for an abdominal ultrasound while in the emergency department, results above.     2359 The patient was sent for an abdomen and pelvis CT while in the emergency department, results above.     0115 I personally reviewed the laboratory and imaging results with the patient and answered all related questions prior to discharge.    Impression & Plan      Medical Decision Making:  Chloe Cleaning is a 22 year old female who presents to the emergency department today for evaluation of right upper quadrant abdominal pain.  She had had some discomfort this morning that got better but became worse this evening come  to come to the ER.  On my evaluation, there was some tenderness to the upper quadrant but no peritoneal findings.  Blood work was obtained that was overall fairly unremarkable.  I did obtain a right upper quadrant ultrasound along with a CT scan, which both were negative.  UA did show red blood cells, but the patient did start her menstrual period today, and was otherwise negative.  Patient did receive morphine along with GI cocktail and Zantac and did feel considerably better.  Exact cause of her symptoms is not clear but is likely from degree of gastritis, given the otherwise negative workup.  Patient was given a prescription for omeprazole and recommended diet modification.  She was instructed to follow the primary care doctor and return to the ER for any new worsening symptoms or further concerns.  History and instructions were completed through the use of an .    Diagnosis:    ICD-10-CM    1. Abdominal pain, right upper quadrant R10.11 UA reflex to Microscopic     UA reflex to Microscopic     CANCELED: *UA reflex to Microscopic (ED Lab POCT Only 3-11)     Disposition:   The patient is discharged to home.    Discharge Medications:  New Prescriptions    OMEPRAZOLE (PRILOSEC) 20 MG CR CAPSULE    Take 1 capsule (20 mg) by mouth daily     Scribe Disclosure:  I, Kristine Gabby, am serving as a scribe at 10:08 PM on 4/5/2018 to document services personally performed by Nato Patel MD based on my observations and the provider's statements to me.     EMERGENCY DEPARTMENT       Nato Patel MD  04/11/18 2999

## 2018-05-07 ENCOUNTER — TELEPHONE (OUTPATIENT)
Dept: OPHTHALMOLOGY | Facility: CLINIC | Age: 23
End: 2018-05-07

## 2018-05-07 NOTE — TELEPHONE ENCOUNTER
[facility] Call Center    Phone Message    May a detailed message be left on voicemail: yes    Reason for Call: Other: Pt sister Laura calling to move up pt appt. pt cannot come today, but would like to come in 05/14/2018 due to eyes itching, watering, painful. Please call pt sister back to schedule sooner appt. Thanks     Action Taken: Message routed to:  [facility]

## 2018-05-21 ENCOUNTER — OFFICE VISIT (OUTPATIENT)
Dept: OPHTHALMOLOGY | Facility: CLINIC | Age: 23
End: 2018-05-21
Attending: OPHTHALMOLOGY
Payer: COMMERCIAL

## 2018-05-21 DIAGNOSIS — H16.423 PANNUS (CORNEAL), BILATERAL: ICD-10-CM

## 2018-05-21 DIAGNOSIS — H11.133: ICD-10-CM

## 2018-05-21 DIAGNOSIS — H18.893 LIMBAL STEM CELL DEFICIENCY, BILATERAL: Primary | ICD-10-CM

## 2018-05-21 DIAGNOSIS — H04.123 DRY EYES, BILATERAL: ICD-10-CM

## 2018-05-21 DIAGNOSIS — H16.9 KERATITIS: ICD-10-CM

## 2018-05-21 PROCEDURE — 68761 CLOSE TEAR DUCT OPENING: CPT | Mod: ZF | Performed by: OPHTHALMOLOGY

## 2018-05-21 PROCEDURE — G0463 HOSPITAL OUTPT CLINIC VISIT: HCPCS | Mod: ZF

## 2018-05-21 ASSESSMENT — VISUAL ACUITY
OS_CC: 20/30
OD_PH_CC: 20/40
OD_CC: 20/50
OD_CC+: -2
OS_CC+: -2
CORRECTION_TYPE: GLASSES
METHOD: SNELLEN - LINEAR

## 2018-05-21 ASSESSMENT — REFRACTION_WEARINGRX
OD_SPHERE: -1.50
OS_CYLINDER: +0.75
OS_AXIS: 100
OS_SPHERE: -1.50
OD_AXIS: 35
OD_CYLINDER: +1.00

## 2018-05-21 ASSESSMENT — CONF VISUAL FIELD
OD_NORMAL: 1
OS_NORMAL: 1

## 2018-05-21 ASSESSMENT — TONOMETRY
OD_IOP_MMHG: 16
OS_IOP_MMHG: 15
IOP_METHOD: ICARE

## 2018-05-21 NOTE — NURSING NOTE
Chief Complaints and History of Present Illnesses   Patient presents with     Follow Up For     Limbal stem cell deficiency     HPI    Affected eye(s):  Both   Symptoms:        Frequency:  Constant       Do you have eye pain now?:  No      Comments:  States va is the same since last visit  +pain in both eyes that has gotten worse  +red and watery  Pat Guerrero COT 9:22 AM May 21, 2018

## 2018-05-21 NOTE — MR AVS SNAPSHOT
After Visit Summary   5/21/2018    Chloe Cleaning    MRN: [id number]           Patient Information     Date Of Birth          1995        Visit Information        Provider Department      5/21/2018 8:45 AM Rey Gonzalez MD; LANGUAGE [facility]        Today's Diagnoses     Limbal stem cell deficiency, bilateral - Both Eyes    -  1    Conjunctival melanosis, bilateral - Both Eyes        Dry eyes, bilateral - Both Eyes        Pannus (corneal), bilateral - Both Eyes        Keratitis           Follow-ups after your visit        Additional Services     NEPHROLOGY ADULT REFERRAL       Your provider has referred you to: [facility] - [place] [phone]   [email or web address]    Please be aware that coverage of these services is subject to the terms and limitations of your health insurance plan.  Call member services at your health plan with any benefit or coverage questions.      Reason for referral:  Pre-op planning for systemic immunosuppression with tacrolimus, cellcept, and prednisone prior to limbal stem cell transplant  Please bring the following to your appointment:    >>   Any x-rays, CTs or MRIs which have been performed.  Contact the facility where they were done to arrange for  prior to your scheduled appointment.   >>   List of current medications   >>   This referral request   >>   Any documents/labs given to you for this referral                  Follow-up notes from your care team     Return in about 3 months (around 8/21/2018) for Follow up vision pressure OU.      Your next 10 appointments already scheduled     Jun 11, 2018  1:30 PM CDT   RETURN CORNEA with Rey Gonzalez MD   Eye Clinic ([facility])    [address] [facility] [address] [place] [id number]   [phone]              Who to contact     Please call your clinic at [phone] to:    Ask  questions about your health    Make or cancel appointments    Discuss your medicines    Learn about your test results    Speak to your doctor            Additional Information About Your Visit        MyChart Information     Veezeon is an electronic gateway that provides easy, online access to your medical records. With Veezeon, you can request a clinic appointment, read your test results, renew a prescription or communicate with your care team.     To sign up for Veezeon visit the website at [email or web address]   You will be asked to enter the access code listed below, as well as some personal information. Please follow the directions to create your username and password.     Your access code is: [email or web address]  Expires: 2018  6:31 AM     Your access code will  in 90 days. If you need help or a new code, please contact your [facility] or call [phone] for assistance.        Care EveryWhere ID     This is your Care EveryWhere ID. This could be used by other organizations to access your [place] medical records  [id number]         Blood Pressure from Last 3 Encounters:   18 114/80   17 112/62   17 121/75    Weight from Last 3 Encounters:   18 68.1 kg (150 lb 3.2 oz)   17 68 kg (150 lb)   17 68 kg (150 lb)              We Performed the Following     NEPHROLOGY ADULT REFERRAL     Punctal Closure, Plugs        Primary Care Provider Fax #    Physician No Ref-Primary [phone]       No address on file        Equal Access to Services     DYLON LOU : Hadii teofilo ku hadasho Soomaali, waaxda luqadaha, qaybta kaalmada adeegyada, eliane de jesus . So [facility] [phone].    ATENCIÓN: Si habla español, tiene a de la cruz disposición servicios gratuitos de asistencia lingüística. Llame al [phone].    We comply with applicable federal civil rights laws and [place] laws. We do not discriminate on the basis of race,  color, national origin, age, disability, sex, sexual orientation, or gender identity.            Thank you!     Thank you for choosing EYE CLINIC  for your care. Our goal is always to provide you with excellent care. Hearing back from our patients is one way we can continue to improve our services. Please take a few minutes to complete the written survey that you may receive in the mail after your visit with us. Thank you!             Your Updated Medication List - Protect others around you: Learn how to safely use, store and throw away your medicines at www.disposemymeds.org.          This list is accurate as of 5/21/18 10:19 AM.  Always use your most recent med list.                   Brand Name Dispense Instructions for use Diagnosis    Carboxymethylcellulose Sod PF 1 % ophthalmic solution    CELLUVISC/REFRESH LIQUIGEL    90 each    Place 1 drop into both eyes every hour (while awake) Dispose of dropperette within 24 hours after opening or if the tip is contaminated.    Limbal stem cell deficiency, bilateral, Dry eyes, bilateral       cephALEXin 500 MG capsule    KEFLEX    20 capsule    Take 1 capsule (500 mg) by mouth 3 times daily    Preseptal cellulitis of right eye       cycloSPORINE 0.05 % ophthalmic emulsion    RESTASIS    30 each    Place 1 drop into both eyes 2 times daily    Limbal stem cell deficiency, bilateral, Dry eyes, bilateral       HYDROcodone-acetaminophen 5-325 MG per tablet    NORCO    6 tablet    Take 1 tablet by mouth every 4 hours as needed for moderate to severe pain        hypromellose 0.3 % Gel ophthalmic gel    GENTEAL    10 g    Place 0.1 g (2 drops) into both eyes At Bedtime Apply approximately a half inch ribbon of ointment to the inside of your lower lids. Warning, application of the ointment to your eyes will cause temporary blurring of your vision from the ointment coating your eye. Please apply right before bedtime.    Limbal stem cell deficiency, bilateral       SUMAtriptan 100  MG tablet    IMITREX     at onset of headache    Throat pain       TYLENOL PO      Take by mouth every 4 hours as needed

## 2018-05-21 NOTE — PROGRESS NOTES
CC: f/up for LSCD OU    HPI: 22 year old Haitian female s/p eyelid surgery OD 6/13/17 here for f/u of limbal stem cell deficiency.      Interval history: patient reports sharp pain in both eyes for 2 weeks and is having trouble sleeping due to pain. Pain worsens if she tries to read without glasses. Feels as if eyes are on fire. Vision improved with glasses. Persistent photophobia.      Previously using:  PFATs four times a day both eyes   Restasis daily      Assessment and Plan:      1. 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 Baseline slit lamp photos 11/2015.   - DDx includes possible trachoma given mild subepithelial upper tarsal sub epithelial fibrosis and superior tarsal follicles   - no conj epi defect on exam today.LSCD appears worsening   - minimize all ocular surface toxicity   - RE > LE peripheral KNV with central subepithelial scarring RIGHT eye and KNV   - s/p lid surgery for lid retraction   - unable to wear scleral lens due to Bell's per Dr. Mac   - Punctal plug missing on examination today in both lower eyelid: replaced collagen punctal plugs BLL   - increase Preservative free artificial tears Q1H both eyes   -consider starting refresh pm ointment at bedtime both eyes    - continue Restasis OU twice a day    - patient may ultimately need limbal stem cell transplant OD, could consider KLAL vs SLET with systemic immunosuppression   - repeat trial of BCL OU   - f/u with transplant medicine physician     RTC 3 months    MIGUEL Roman  Cornea fellow    ~~~~~~~~~~~~~~~~~~~~~~~~~~~~~~~~~~~~~~~~~~~~~~~~~~~~~~~~~~~~~~~~    Complete documentation of historical and exam elements from today's encounter can be found in the full encounter summary report (not reduplicated in this progress note). I personally obtained the chief complaint(s) and history of present illness.  I confirmed and edited as necessary the review of systems, past medical/surgical history, family history, social history, and examination  findings as documented by others; and I examined the patient myself. I personally reviewed the relevant tests, images, and reports as documented above. I formulated and edited as necessary the assessment and plan and discussed the findings and management plan with the patient and family.    I was present for the entire procedure.    Rey Gonzalez MD    I personally spent great than 40min with the patient, of which >50% of the time was spent face to face with the patient, counseling and coordinating care with the patient. We discussed the complexity of her diagnosis, the need for further information prior to proceeding with yet another surgery, and the unknown prognosis for the patient at this time.    Rey Gonzalez MD

## 2018-05-30 ENCOUNTER — APPOINTMENT (OUTPATIENT)
Dept: ULTRASOUND IMAGING | Facility: CLINIC | Age: 23
End: 2018-05-30
Attending: EMERGENCY MEDICINE
Payer: COMMERCIAL

## 2018-05-30 ENCOUNTER — HOSPITAL ENCOUNTER (EMERGENCY)
Facility: CLINIC | Age: 23
Discharge: HOME | End: 2018-05-30
Attending: EMERGENCY MEDICINE | Admitting: EMERGENCY MEDICINE
Payer: COMMERCIAL

## 2018-05-30 VITALS
TEMPERATURE: 98 F | SYSTOLIC BLOOD PRESSURE: 106 MMHG | DIASTOLIC BLOOD PRESSURE: 78 MMHG | OXYGEN SATURATION: 100 % | RESPIRATION RATE: 18 BRPM | HEART RATE: 78 BPM

## 2018-05-30 DIAGNOSIS — D50.9 MICROCYTIC ANEMIA: ICD-10-CM

## 2018-05-30 DIAGNOSIS — N94.6 DYSMENORRHEA: ICD-10-CM

## 2018-05-30 LAB
ALBUMIN SERPL-MCNC: 3.9 G/DL (ref 3.4–5)
ALBUMIN UR-MCNC: 30 MG/DL
ALP SERPL-CCNC: 62 U/L (ref 40–150)
ALT SERPL W P-5'-P-CCNC: 12 U/L (ref 0–50)
ANION GAP SERPL CALCULATED.3IONS-SCNC: 9 MMOL/L (ref 3–14)
APPEARANCE UR: ABNORMAL
AST SERPL W P-5'-P-CCNC: 9 U/L (ref 0–45)
BASOPHILS # BLD AUTO: 0 10E9/L (ref 0–0.2)
BASOPHILS NFR BLD AUTO: 0.2 %
BILIRUB SERPL-MCNC: 0.4 MG/DL (ref 0.2–1.3)
BILIRUB UR QL STRIP: NEGATIVE
BUN SERPL-MCNC: 9 MG/DL (ref 7–30)
CALCIUM SERPL-MCNC: 8.8 MG/DL (ref 8.5–10.1)
CHLORIDE SERPL-SCNC: 109 MMOL/L (ref 94–109)
CO2 SERPL-SCNC: 22 MMOL/L (ref 20–32)
COLOR UR AUTO: YELLOW
CREAT SERPL-MCNC: 0.63 MG/DL (ref 0.52–1.04)
DIFFERENTIAL METHOD BLD: ABNORMAL
EOSINOPHIL # BLD AUTO: 0.1 10E9/L (ref 0–0.7)
EOSINOPHIL NFR BLD AUTO: 1.4 %
ERYTHROCYTE [DISTWIDTH] IN BLOOD BY AUTOMATED COUNT: 16.4 % (ref 10–15)
GFR SERPL CREATININE-BSD FRML MDRD: >90 ML/MIN/1.7M2
GLUCOSE SERPL-MCNC: 88 MG/DL (ref 70–99)
GLUCOSE UR STRIP-MCNC: NEGATIVE MG/DL
HCG SERPL QL: NEGATIVE
HCT VFR BLD AUTO: 32.7 % (ref 35–47)
HGB BLD-MCNC: 10.3 G/DL (ref 11.7–15.7)
HGB UR QL STRIP: ABNORMAL
HYALINE CASTS #/AREA URNS LPF: 6 /LPF (ref 0–2)
IMM GRANULOCYTES # BLD: 0 10E9/L (ref 0–0.4)
IMM GRANULOCYTES NFR BLD: 0.3 %
KETONES UR STRIP-MCNC: 10 MG/DL
LEUKOCYTE ESTERASE UR QL STRIP: NEGATIVE
LIPASE SERPL-CCNC: 74 U/L (ref 73–393)
LYMPHOCYTES # BLD AUTO: 1.1 10E9/L (ref 0.8–5.3)
LYMPHOCYTES NFR BLD AUTO: 16.6 %
MCH RBC QN AUTO: 23.8 PG (ref 26.5–33)
MCHC RBC AUTO-ENTMCNC: 31.5 G/DL (ref 31.5–36.5)
MCV RBC AUTO: 76 FL (ref 78–100)
MONOCYTES # BLD AUTO: 0.2 10E9/L (ref 0–1.3)
MONOCYTES NFR BLD AUTO: 2.9 %
MUCOUS THREADS #/AREA URNS LPF: PRESENT /LPF
NEUTROPHILS # BLD AUTO: 5 10E9/L (ref 1.6–8.3)
NEUTROPHILS NFR BLD AUTO: 78.6 %
NITRATE UR QL: NEGATIVE
NRBC # BLD AUTO: 0 10*3/UL
NRBC BLD AUTO-RTO: 0 /100
PH UR STRIP: 7.5 PH (ref 5–7)
PLATELET # BLD AUTO: 246 10E9/L (ref 150–450)
POTASSIUM SERPL-SCNC: 4 MMOL/L (ref 3.4–5.3)
PROT SERPL-MCNC: 8 G/DL (ref 6.8–8.8)
RBC # BLD AUTO: 4.33 10E12/L (ref 3.8–5.2)
RBC #/AREA URNS AUTO: 4 /HPF (ref 0–2)
SODIUM SERPL-SCNC: 140 MMOL/L (ref 133–144)
SOURCE: ABNORMAL
SP GR UR STRIP: 1.02 (ref 1–1.03)
SQUAMOUS #/AREA URNS AUTO: 1 /HPF (ref 0–1)
UROBILINOGEN UR STRIP-MCNC: 2 MG/DL (ref 0–2)
WBC # BLD AUTO: 6.3 10E9/L (ref 4–11)
WBC #/AREA URNS AUTO: 2 /HPF (ref 0–5)

## 2018-05-30 PROCEDURE — 96374 THER/PROPH/DIAG INJ IV PUSH: CPT

## 2018-05-30 PROCEDURE — 84703 CHORIONIC GONADOTROPIN ASSAY: CPT | Performed by: EMERGENCY MEDICINE

## 2018-05-30 PROCEDURE — 99285 EMERGENCY DEPT VISIT HI MDM: CPT | Mod: 25

## 2018-05-30 PROCEDURE — 25000132 ZZH RX MED GY IP 250 OP 250 PS 637: Performed by: EMERGENCY MEDICINE

## 2018-05-30 PROCEDURE — 85025 COMPLETE CBC W/AUTO DIFF WBC: CPT | Performed by: EMERGENCY MEDICINE

## 2018-05-30 PROCEDURE — 81001 URINALYSIS AUTO W/SCOPE: CPT | Performed by: EMERGENCY MEDICINE

## 2018-05-30 PROCEDURE — 83690 ASSAY OF LIPASE: CPT | Performed by: EMERGENCY MEDICINE

## 2018-05-30 PROCEDURE — 80053 COMPREHEN METABOLIC PANEL: CPT | Performed by: EMERGENCY MEDICINE

## 2018-05-30 PROCEDURE — 96375 TX/PRO/DX INJ NEW DRUG ADDON: CPT

## 2018-05-30 PROCEDURE — 25000128 H RX IP 250 OP 636: Performed by: EMERGENCY MEDICINE

## 2018-05-30 PROCEDURE — 93976 VASCULAR STUDY: CPT | Mod: XS

## 2018-05-30 RX ORDER — NAPROXEN 500 MG/1
500 TABLET ORAL 2 TIMES DAILY WITH MEALS
Qty: 16 TABLET | Refills: 0 | Status: SHIPPED | OUTPATIENT
Start: 2018-05-30 | End: 2018-06-07

## 2018-05-30 RX ORDER — TRAMADOL HYDROCHLORIDE 50 MG/1
50 TABLET ORAL EVERY 6 HOURS PRN
Qty: 10 TABLET | Refills: 0 | Status: SHIPPED | OUTPATIENT
Start: 2018-05-30 | End: 2019-09-27

## 2018-05-30 RX ORDER — ONDANSETRON 4 MG/1
4 TABLET, ORALLY DISINTEGRATING ORAL EVERY 8 HOURS PRN
Qty: 10 TABLET | Refills: 0 | Status: SHIPPED | OUTPATIENT
Start: 2018-05-30 | End: 2018-06-02

## 2018-05-30 RX ORDER — KETOROLAC TROMETHAMINE 15 MG/ML
15 INJECTION, SOLUTION INTRAMUSCULAR; INTRAVENOUS ONCE
Status: COMPLETED | OUTPATIENT
Start: 2018-05-30 | End: 2018-05-30

## 2018-05-30 RX ORDER — ONDANSETRON 2 MG/ML
4 INJECTION INTRAMUSCULAR; INTRAVENOUS ONCE
Status: COMPLETED | OUTPATIENT
Start: 2018-05-30 | End: 2018-05-30

## 2018-05-30 RX ORDER — TRAMADOL HYDROCHLORIDE 50 MG/1
50 TABLET ORAL ONCE
Status: COMPLETED | OUTPATIENT
Start: 2018-05-30 | End: 2018-05-30

## 2018-05-30 RX ADMIN — KETOROLAC TROMETHAMINE 15 MG: 15 INJECTION, SOLUTION INTRAMUSCULAR; INTRAVENOUS at 19:58

## 2018-05-30 RX ADMIN — TRAMADOL HYDROCHLORIDE 50 MG: 50 TABLET, COATED ORAL at 22:07

## 2018-05-30 RX ADMIN — ONDANSETRON 4 MG: 2 INJECTION INTRAMUSCULAR; INTRAVENOUS at 19:58

## 2018-05-30 ASSESSMENT — ENCOUNTER SYMPTOMS
NAUSEA: 1
FLANK PAIN: 1
ABDOMINAL PAIN: 1
VOMITING: 1
FEVER: 0

## 2018-05-30 NOTE — LETTER
May 30, 2018      To Whom It May Concern:      Chloe Cleaning was seen in our Emergency Department today, 05/30/18.  I expect her condition to improve over the next 1-2 days.  She may return to work when improved.    Sincerely,        Diogo Arteaga MD

## 2018-05-30 NOTE — ED AVS SNAPSHOT
Emergency Department    [address] [place] [id number]    Phone:  [phone]    Fax:  [phone]                                       Chloe Cleaning   MRN: [id number]    Department:   Emergency Department   Date of Visit:  5/30/2018           After Visit Summary Signature Page     I have received my discharge instructions, and my questions have been answered. I have discussed any challenges I see with this plan with the nurse or doctor.    ..........................................................................................................................................  Patient/Patient Representative Signature      ..........................................................................................................................................  Patient Representative Print Name and Relationship to Patient    ..................................................               ................................................  Date                                            Time    ..........................................................................................................................................  Reviewed by Signature/Title    ...................................................              ..............................................  Date                                                            Time

## 2018-05-30 NOTE — ED AVS SNAPSHOT
Emergency Department    [address]    [place] [address]    Phone:  [phone]    Fax:  [phone]                                       Chloe Cleaning   MRN: [id number]    Department:   Emergency Department   Date of Visit:  5/30/2018           Patient Information     Date Of Birth          1995        Your diagnoses for this visit were:     Dysmenorrhea     Microcytic anemia        You were seen by Diogo Arteaga MD.      Follow-up Information     Follow up with Clinic, [facility]. Schedule an appointment as soon as possible for a visit in 1 week.    Why:  For close follow up with a gynecologist    Contact information:    [facility]  [address] [place] [id number] [phone]          Discharge Instructions         Painful Menstrual Periods (Dysmenorrhea)    Dysmenorrhea is the term used to describe painful menstrual periods.  The uterus is a muscle. Normally, chemicals called prostaglandins cause the uterus to contract during your period. The contractions push out the build-up of tissue that occurs each month inside the uterus. If the contraction is very strong, it can cause pain. The pain may feel like cramping in the lower abdomen, lower back, or thighs. In severe cases, you may have other symptoms as well. These can include nausea, vomiting, loose stools, sweating, or dizziness.  There are 2 types of dysmenorrhea:  Primary dysmenorrhea refers to common menstrual cramps. It may begin 1 or 2 years after you first get your period. It may get better or go away as you get older or when you have a baby. The cramps are most often felt just before, or on the first day of your period. They may last 1 to 3 days. Treatment is with medicines and comfort measures as described below (see the  Home care  section).  Secondary dysmenorrhea may start later in life. It describes menstrual pain that occurs due to an underlying health  problem. The pain may last longer than common menstrual cramps. It may also worsen over time. Some problems that can lead to secondary dysmenorrhea include:     Pelvic inflammatory disease (PID). Infection that involves the female reproductive organs, such as the uterus and fallopian tubes    Fibroids. Benign growths within the wall of the uterus (not cancer)    Endometriosis. Tissue that normally only lines the uterus also grows outside of it (because the abnormal tissue also swells and bleeds each month, it can cause pain)  Once the cause of secondary dysmenorrhea is found, it can be treated. Your healthcare provider will discuss options with you as needed. Your care may also include some of the treatments described below (see the  Home care  section).  Home care  Medicines  Certain medicines can help relieve or prevent menstrual pain and cramping. These can include:    Nonsteroidal anti-inflammatory drugs (NSAIDs), such as ibuprofen    Prescription pain medicine, if needed    Hormone therapy (this includes most methods of hormonal birth control such as pills, shots, or a hormone-releasing IUD)  General care  To help relieve pain and cramping, try these tips:    Rest as needed.    Apply a heating pad to the lower belly or back as directed. A warm bath or massage to these areas may also help.    Exercise regularly. Many women find that being more active each week helps reduce pain and cramping.    Ask your healthcare provider for advice about other treatments you can try to help control pain and cramping.  Follow-up care  Follow up with your healthcare provider, or as advised.  When to seek medical advice  Call your healthcare provider right away if any of these occur:    Fever of 100.4 F (38 C) or higher, or as directed by your provider    Pain or cramping worsens or doesn t improve with medicine    Pain or cramping lasts longer than usual or occurs between periods    Unusual vaginal discharge between  periods    Bleeding becomes heavy (soaking more than 1 pad or tampon every hour for 3 hours)    Passage of pink or gray tissue from the vagina  Date Last Reviewed: 10/1/2017    4301-6392 [employer]. [address] [place] [address], [place] [address]. All rights reserved. This information is not intended as a substitute for professional medical care. Always follow your healthcare professional's instructions.          Anemia  Anemia is a condition that occurs when your body does not have enough healthy red blood cells (RBCs). RBCs are the parts of your blood that carry oxygen throughout your body. A protein called hemoglobin allows your RBCs to absorb and release oxygen. Without enough RBCs or hemoglobin, your body doesn't get enough oxygen. Symptoms of anemia may then occur.    What are the symptoms of anemia?  Some people with anemia have no symptoms. But most people have symptoms that range from mild to severe. These can include:    Tiredness (fatigue)    Weakness    Pale skin    Shortness of breath    Dizziness or fainting    Rapid heartbeat    Trouble doing normal amounts of activity    Jaundice (yellowing of your eyes, skin, or mouth; dark urine)  What causes anemia?  Anemia can occur when your body:    Loses too much blood    Does not make enough RBCs    Destroys your RBCs at a faster rate than it can replace them    Does not make a normal amount of hemoglobin in your RBCs  These problems can occur for many reasons, including:    A condition that you are born with (congenital or inherited), such as sickle cell disease or thalassemia    Heavy bleeding for any reason, including injury, surgery, childbirth, or even heavy menstrual periods    Being low in certain nutrients, such as iron, folate, or vitamin B12, possibly from a poor diet or a condition like celiac disease or Crohn's disease    Certain chronic conditions like diabetes, arthritis, or kidney disease    Certain chronic infections like tuberculosis  or HIV    Exposure to certain medicines, such as those used for chemotherapy  There are different types of anemia. Your healthcare provider can tell you more about the type of anemia you have and what may have caused it.  How is anemia diagnosed?  To diagnose anemia, your healthcare provider orders blood tests. These can include:    Complete blood cell count (CBC). This test measures the amounts of the different types of blood cells.    Blood smear. This test checks the size and shape of your blood cells. To do the test, a drop of your blood is viewed under a microscope. A stain is used to make the blood cells easier to see.    Iron studies. These tests measure the amount of iron in your blood. Your body needs iron to make hemoglobin in your RBCs.    Vitamin B12 and folate studies. These tests check for some of the components that help give RBCs a normal size and shape.    Reticulocyte count. This test measures the amount of new RBCs that your bone marrow makes.    Hemoglobin electrophoresis. This test checks for problems with your hemoglobin in RBCs.  How is anemia treated?  Treatment for anemia is based on the type of anemia, its cause, and the severity of your symptoms. Treatments may include:    Diet changes. This involves increasing the amount of certain nutrients in your diet, such as iron, vitamin B12, or folate. Your healthcare provider may also prescribe nutrient supplements.    Medicines. Certain medicines treat the cause of your anemia. Others help build new RBCs or relieve symptoms. If a medicine is the cause of your anemia, you may need to stop or change it.    Blood transfusions. Replacing some of your blood can increase the number of healthy RBCs in your body.    Surgery. In some cases, your doctor may do surgery to treat the underlying cause of anemia. If you need surgery, your healthcare provider will explain the procedure and outline the risks and benefits for you.  What are the long-term  concerns?  If you have a certain type of anemia, you can expect a full recovery after treatment. If you have other types of anemia (especially a type you're born with), you will need to manage it for life. Your doctor can tell you more.  Date Last Reviewed: 12/1/2016 2000-2017 [employer]. [address] [place] [address], [place] [address]. All rights reserved. This information is not intended as a substitute for professional medical care. Always follow your healthcare professional's instructions.          Your next 10 appointments already scheduled     Jun 11, 2018  1:30 PM CDT   RETURN CORNEA with Rey Gonzalez MD   Eye Clinic ([facility])    [address] [address] [phone]              24 Hour Appointment Hotline       To make an appointment at any [facility], call [phone] ([phone]). If you don't have a family doctor or clinic, we will help you find one. [facility] are conveniently located to serve the needs of you and your family.             Review of your medicines      START taking        Dose / Directions Last dose taken    naproxen 500 MG tablet   Commonly known as:  NAPROSYN   Dose:  500 mg   Quantity:  16 tablet        Take 1 tablet (500 mg) by mouth 2 times daily (with meals) for 8 days As needed for pain   Refills:  0        ondansetron 4 MG ODT tab   Commonly known as:  ZOFRAN ODT   Dose:  4 mg   Quantity:  10 tablet        Take 1 tablet (4 mg) by mouth every 8 hours as needed for nausea or vomiting   Refills:  0        traMADol 50 MG tablet   Commonly known as:  ULTRAM   Dose:  50 mg   Quantity:  10 tablet        Take 1 tablet (50 mg) by mouth every 6 hours as needed for severe pain   Refills:  0          Our records show that you are taking the medicines listed below. If these are incorrect, please call your family doctor or clinic.        Dose / Directions Last dose taken     Carboxymethylcellulose Sod PF 1 % ophthalmic solution   Commonly known as:  CELLUVISC/REFRESH LIQUIGEL   Dose:  1 drop   Quantity:  90 each        Place 1 drop into both eyes every hour (while awake) Dispose of dropperette within 24 hours after opening or if the tip is contaminated.   Refills:  4        cycloSPORINE 0.05 % ophthalmic emulsion   Commonly known as:  RESTASIS   Dose:  1 drop   Quantity:  30 each        Place 1 drop into both eyes 2 times daily   Refills:  3        hypromellose 0.3 % Gel ophthalmic gel   Commonly known as:  GENTEAL   Dose:  2 drop   Quantity:  10 g        Place 0.1 g (2 drops) into both eyes At Bedtime Apply approximately a half inch ribbon of ointment to the inside of your lower lids. Warning, application of the ointment to your eyes will cause temporary blurring of your vision from the ointment coating your eye. Please apply right before bedtime.   Refills:  11        SUMAtriptan 100 MG tablet   Commonly known as:  IMITREX        at onset of headache   Refills:  3        TYLENOL PO        Take by mouth every 4 hours as needed   Refills:  0                Information about OPIOIDS     PRESCRIPTION OPIOIDS: WHAT YOU NEED TO KNOW   You have a prescription for an opioid (narcotic) pain medicine. Opioids can cause addiction. If you have a history of chemical dependency of any type, you are at a higher risk of becoming addicted to opioids. Only take this medicine after all other options have been tried. Take it for as short a time and as few doses as possible.     Do not:    Drive. If you drive while taking these medicines, you could be arrested for driving under the influence (DUI).    Operate heavy machinery    Do any other dangerous activities while taking these medicines.     Drink any alcohol while taking these medicines.      Take with any other medicines that contain acetaminophen. Read all labels carefully. Look for the word  acetaminophen  or  Tylenol.  Ask your pharmacist if you  have questions or are unsure.    Store your pills in a secure place, locked if possible. We will not replace any lost or stolen medicine. If you don t finish your medicine, please throw away (dispose) as directed by your pharmacist. The [place] [employer]ution [employer] has more information about safe disposal: https://www.pca.state.[place]/living-green/managing-unwanted-medications    All opioids tend to cause constipation. Drink plenty of water and eat foods that have a lot of fiber, such as fruits, vegetables, prune juice, apple juice and high-fiber cereal. Take a laxative (Miralax, milk of magnesia, Colace, Senna) if you don t move your bowels at least every other day.         Prescriptions were sent or printed at these locations (3 Prescriptions)                   Other Prescriptions                Printed at Department/Unit printer (3 of 3)         naproxen (NAPROSYN) 500 MG tablet               ondansetron (ZOFRAN ODT) 4 MG ODT tab               traMADol (ULTRAM) 50 MG tablet                Procedures and tests performed during your visit     CBC with platelets differential    Comprehensive metabolic panel    HCG qualitative Blood    Lipase    Peripheral IV catheter    UA reflex to Microscopic and Culture    US Pelvis Cmpl wo Transvaginal w Abd/Pel Duplex Lmt      Orders Needing Specimen Collection     None      Pending Results     No orders found from 5/28/2018 to 5/31/2018.            Pending Culture Results     No orders found from 5/28/2018 to 5/31/2018.            Pending Results Instructions     If you had any lab results that were not finalized at the time of your Discharge, you can call the ED Lab Result RN at [phone]. You will be contacted by this team for any positive Lab results or changes in treatment. The nurses are available 7 days a week from 10A to 6:30P.  You can leave a message 24 hours per day and they will return your call.        Test Results From Your Hospital Stay        5/30/2018   7:37 PM      Component Results     Component Value Ref Range & Units Status    WBC 6.3 4.0 - 11.0 10e9/L Final    RBC Count 4.33 3.8 - 5.2 10e12/L Final    Hemoglobin 10.3 (L) 11.7 - 15.7 g/dL Final    Hematocrit 32.7 (L) 35.0 - 47.0 % Final    MCV 76 (L) 78 - 100 fl Final    MCH 23.8 (L) 26.5 - 33.0 pg Final    MCHC 31.5 31.5 - 36.5 g/dL Final    RDW 16.4 (H) 10.0 - 15.0 % Final    Platelet Count 246 150 - 450 10e9/L Final    Diff Method Automated Method  Final    % Neutrophils 78.6 % Final    % Lymphocytes 16.6 % Final    % Monocytes 2.9 % Final    % Eosinophils 1.4 % Final    % Basophils 0.2 % Final    % Immature Granulocytes 0.3 % Final    Nucleated RBCs 0 0 /100 Final    Absolute Neutrophil 5.0 1.6 - 8.3 10e9/L Final    Absolute Lymphocytes 1.1 0.8 - 5.3 10e9/L Final    Absolute Monocytes 0.2 0.0 - 1.3 10e9/L Final    Absolute Eosinophils 0.1 0.0 - 0.7 10e9/L Final    Absolute Basophils 0.0 0.0 - 0.2 10e9/L Final    Abs Immature Granulocytes 0.0 0 - 0.4 10e9/L Final    Absolute Nucleated RBC 0.0  Final         5/30/2018  7:57 PM      Component Results     Component Value Ref Range & Units Status    Sodium 140 133 - 144 mmol/L Final    Potassium 4.0 3.4 - 5.3 mmol/L Final    Chloride 109 94 - 109 mmol/L Final    Carbon Dioxide 22 20 - 32 mmol/L Final    Anion Gap 9 3 - 14 mmol/L Final    Glucose 88 70 - 99 mg/dL Final    Urea Nitrogen 9 7 - 30 mg/dL Final    Creatinine 0.63 0.52 - 1.04 mg/dL Final    GFR Estimate >90 >60 mL/min/1.7m2 Final    Non  GFR Calc    GFR Estimate If Black >90 >60 mL/min/1.7m2 Final    African American GFR Calc    Calcium 8.8 8.5 - 10.1 mg/dL Final    Bilirubin Total 0.4 0.2 - 1.3 mg/dL Final    Albumin 3.9 3.4 - 5.0 g/dL Final    Protein Total 8.0 6.8 - 8.8 g/dL Final    Alkaline Phosphatase 62 40 - 150 U/L Final    ALT 12 0 - 50 U/L Final    AST 9 0 - 45 U/L Final         5/30/2018  7:55 PM      Component Results     Component Value Ref Range & Units Status    Lipase 74  73 - 393 U/L Final         5/30/2018  7:47 PM      Component Results     Component Value Ref Range & Units Status    HCG Qualitative Serum Negative NEG^Negative Final    This test is for screening purposes.  Results should be interpreted along with   the clinical picture.  Confirmation testing is available if warranted by   ordering BUX484, HCG Quantitative Pregnancy.                 5/30/2018  8:36 PM      Narrative     PELVIC ULTRASOUND   5/30/2018 8:20 PM     HISTORY: Left pelvic pain.     COMPARISON: None.    FINDINGS:  The uterus is normal in size.  No focal lesions are seen in  the myometrium.  Endometrium is 8 mm thick and appears normal.      The ovaries are normal in size with no focal lesions. Color-flow  Doppler spectral waveform analysis shows normal blood flow to both  ovaries. Dominant follicle seen in left ovary.  There are no adnexal  masses. There is no free fluid in the cul-de-sac.        Impression     IMPRESSION:   Normal pelvic ultrasound.    FARA ROBIN MD         5/30/2018  9:36 PM      Component Results     Component Value Ref Range & Units Status    Color Urine Yellow  Final    Appearance Urine Slightly Cloudy  Final    Glucose Urine Negative NEG^Negative mg/dL Final    Bilirubin Urine Negative NEG^Negative Final    Ketones Urine 10 (A) NEG^Negative mg/dL Final    Specific Gravity Urine 1.025 1.003 - 1.035 Final    Blood Urine Moderate (A) NEG^Negative Final    pH Urine 7.5 (H) 5.0 - 7.0 pH Final    Protein Albumin Urine 30 (A) NEG^Negative mg/dL Final    Urobilinogen mg/dL 2.0 0.0 - 2.0 mg/dL Final    Nitrite Urine Negative NEG^Negative Final    Leukocyte Esterase Urine Negative NEG^Negative Final    Source Midstream Urine  Final    RBC Urine 4 (H) 0 - 2 /HPF Final    WBC Urine 2 0 - 5 /HPF Final    Squamous Epithelial /HPF Urine 1 0 - 1 /HPF Final    Mucous Urine Present (A) NEG^Negative /LPF Final    Hyaline Casts 6 (H) 0 - 2 /LPF Final                Clinical Quality Measure: Blood  Pressure Screening     Your blood pressure was checked while you were in the emergency department today. The last reading we obtained was  BP: 102/76 . Please read the guidelines below about what these numbers mean and what you should do about them.  If your systolic blood pressure (the top number) is less than 120 and your diastolic blood pressure (the bottom number) is less than 80, then your blood pressure is normal. There is nothing more that you need to do about it.  If your systolic blood pressure (the top number) is 120-139 or your diastolic blood pressure (the bottom number) is 80-89, your blood pressure may be higher than it should be. You should have your blood pressure rechecked within a year by a primary care provider.  If your systolic blood pressure (the top number) is 140 or greater or your diastolic blood pressure (the bottom number) is 90 or greater, you may have high blood pressure. High blood pressure is treatable, but if left untreated over time it can put you at risk for heart attack, stroke, or kidney failure. You should have your blood pressure rechecked by a primary care provider within the next 4 weeks.  If your provider in the emergency department today gave you specific instructions to follow-up with your doctor or provider even sooner than that, you should follow that instruction and not wait for up to 4 weeks for your follow-up visit.        Thank you for choosing [facility]       Thank you for choosing [facility] for your care. Our goal is always to provide you with excellent care. Hearing back from our patients is one way we can continue to improve our services. Please take a few minutes to complete the written survey that you may receive in the mail after you visit with us. Thank you!        [facility] Information     [employer] lets you send messages to your doctor, view your test results, renew your prescriptions, schedule appointments and more. To sign up, go to [email or web address] .  "Click on \"Log in\" on the left side of the screen, which will take you to the Welcome page. Then click on \"Sign up Now\" on the right side of the page.     You will be asked to enter the access code listed below, as well as some personal information. Please follow the directions to create your username and password.     Your access code is: [email or web address]  Expires: 2018  6:31 AM     Your access code will  in 90 days. If you need help or a new code, please call your [facility] clinic or [phone].        Care EveryWhere ID     This is your Care EveryWhere ID. This could be used by other organizations to access your [facility] medical records  [id number]        Equal Access to Services     DYLON LOU : Amie Bloom, wathony durham, nae nance, eliane haile. So [facility] [phone].    ATENCIÓN: Si habla español, tiene a de la cruz disposición servicios gratuitos de asistencia lingüística. Llame al [phone].    We comply with applicable federal civil rights laws and [place] laws. We do not discriminate on the basis of race, color, national origin, age, disability, sex, sexual orientation, or gender identity.            After Visit Summary       This is your record. Keep this with you and show to your community pharmacist(s) and doctor(s) at your next visit.                  "

## 2018-05-31 NOTE — ED PROVIDER NOTES
History     Chief Complaint:  Abdominal Pain     The history is provided by the patient and a friend. A  was used.      Chloe Cleaning is a 22 year old female who presents to the emergency department with a friend for evaluation of abdominal pain. The patient reports that with each period for the past three years, she has pain to her left lower quadrant and left flank which radiates into her left leg. She usually takes tylenol which helps to improve her pain, but with this episode tylenol did not help. The pain is similar in character to her other episodes of pain but more severe. She began having her period today and it came with onset of pain. Her bleeding is a normal amount to her typical periods. Her pain is accompanied by nausea and one episode of NBNB vomiting at home, but she denies any recent fevers. She took tylenol at 1430 with no relief and presents here with continued pain. She has never been seen by Ob Gyn for her episodes of abdominal pain. The patient has never been pregnant. She denies any urinary symptoms. Abd pain is crampy in nature.     Allergies:  Allergies reviewed. No pertinent allergies.     Medications:    Tylenol (Last took 1430)  Celluvisc opthalmic solution  Restasis   Genteal   Imitrex     Past Medical History:    Conjunctival melanosis, bilateral   Dry eye   Keratitis   Limbal stem cell deficiency   Pannus (corneal), bilateral     Past Surgical History:    Repair lid retraction x 2  Tarsorrhaphy x 2    Family History:    Family history reviewed. No pertinent family history.    Social History:  The patient was accompanied to the emergency department by a friend.  Smoking Status: Never Smoker  Smokeless Tobacco: Never Used  Alcohol Use: No  Marital Status: Single    Review of Systems   Constitutional: Negative for fever.   Gastrointestinal: Positive for abdominal pain, nausea and vomiting.   Genitourinary: Positive for flank pain, pelvic pain and vaginal bleeding.    Musculoskeletal:        Left leg pain   All other systems reviewed and are negative.    Physical Exam     Patient Vitals for the past 24 hrs:   BP Temp Temp src Pulse Heart Rate Resp SpO2   05/30/18 2220 - - - - - - 100 %   05/30/18 2219 106/78 - - - - - 100 %   05/30/18 2123 102/76 - - 78 - 18 99 %   05/30/18 1925 106/70 - - 71 - 19 99 %   05/30/18 1659 113/64 98  F (36.7  C) Oral - 92 18 100 %     Physical Exam  General: Nontoxic.  Resting comfortably  Head:  Scalp, face, and head appear normal  Eyes:  Pupils are equal, round, and reactive to light    Conjunctivae non-injected and sclerae white  ENT:    The external nose is normal    Pinnae are normal    The oropharynx is normal, mucous membranes moist    Uvula is in the midline  Neck:  Normal range of motion    There is no rigidity noted    Trachea is in the midline  CV:  Regular rate and rhythm     Normal S1/S2, no S3/S4    No murmur or rub  Resp:  Lungs are clear and equal bilaterally    There is no tachypnea    No increased work of breathing    No rales, wheezing, or rhonchi  GI:  Abdomen is soft, no rigidity or guarding    No distension, or mass    Mild tenderness in LLQ and suprapubic area. No rebound tenderness   MS:  Normal muscular tone    Symmetric motor strength    No lower extremity edema  Skin:  No rash or acute skin lesions noted  Neuro: Awake and alert    Speech is normal and fluent    Moves all extremities spontaneously  Psych:  Normal affect.  Appropriate interactions.    Emergency Department Course     Imaging:  Radiology findings were communicated with the patient who voiced understanding of the findings.    US Pelvis Cmpl wo Transvaginal w Abd/Pel Duplex Lmt  Normal pelvic ultrasound.  Reading per radiology.     Laboratory:  Laboratory findings were communicated with the patient who voiced understanding of the findings.    CBC: WBC 6.3, HGB 10.3 (L),   CMP: WNL (Creatinine 0.63)  Lipase: 74  HCG qualitative blood: Negative      Interventions:  1958 Toradol 15 mg IV   1958 Zofran 4 mg IV   2207 Ultram 50 mg PO     Emergency Department Course:     Nursing notes and vitals reviewed.     I performed an exam of the patient as documented above.     IV was inserted and blood was drawn for laboratory testing, results above.     The patient was sent for an ultrasound while in the emergency department, results above.    2219 I personally reviewed the laboratory and imaging results with the patient and answered all related questions prior to discharge.    Impression & Plan      Medical Decision Making:  Chloe Cleaning is a 22 year old female who presents for evaluation of pelvic pain during her period.  Causes of pelvic pain include: ectopic pregnancy, ovarian torsion, ruptured ovarian cyst, fibroids, PID, TOA, endometriosis, along with the more classic causes of abdominal pain such as appendicitis, urinary calculi, cystitis, obstruction or mesenteric ischemia.  Physical exam did not show any signs of acute surgical emergency/peritonitis. Patient is well appearing.  Pregnancy test was negative.  No sign of UTI.  Labs were significant for slightly low hemoglobin at 10.3 which is microcytic and consistent with chronic blood loss likely from menstration.  Pelvic ultrasound was normal, no evidence to suggest torsion, cyst or mass.  In this patient, there are no signs of serious etiologies of abdominal pain.  Supportive outpatient management is therefore indicated. I stressed the importance of close follow up with gynecology. Plan is home, close follow-up with gynecology and analgesics and antiemetics for home. Return precautions were discussed with patient. The patient's questions were answered and the patient was agreeable with discharge.     Diagnosis:    ICD-10-CM    1. Dysmenorrhea N94.6    2. Microcytic anemia D50.9      Disposition:   The patient was discharged home.      Discharge Medications:  Discharge Medication List as of 5/30/2018 10:09 PM       START taking these medications    Details   naproxen (NAPROSYN) 500 MG tablet Take 1 tablet (500 mg) by mouth 2 times daily (with meals) for 8 days As needed for pain, Disp-16 tablet, R-0, Local Print      ondansetron (ZOFRAN ODT) 4 MG ODT tab Take 1 tablet (4 mg) by mouth every 8 hours as needed for nausea or vomiting, Disp-10 tablet, R-0, Local Print      traMADol (ULTRAM) 50 MG tablet Take 1 tablet (50 mg) by mouth every 6 hours as needed for severe pain, Disp-10 tablet, R-0, Local Print             Scribe Disclosure:  I, Patricia Mccoy, am serving as a scribe at 7:41 PM on 5/30/2018 to document services personally performed by Diogo Arteaga MD, based on my observations and the provider's statements to me.         EMERGENCY DEPARTMENT       Diogo Arteaga MD  05/31/18 0127

## 2018-05-31 NOTE — DISCHARGE INSTRUCTIONS
Painful Menstrual Periods (Dysmenorrhea)    Dysmenorrhea is the term used to describe painful menstrual periods.  The uterus is a muscle. Normally, chemicals called prostaglandins cause the uterus to contract during your period. The contractions push out the build-up of tissue that occurs each month inside the uterus. If the contraction is very strong, it can cause pain. The pain may feel like cramping in the lower abdomen, lower back, or thighs. In severe cases, you may have other symptoms as well. These can include nausea, vomiting, loose stools, sweating, or dizziness.  There are 2 types of dysmenorrhea:  Primary dysmenorrhea refers to common menstrual cramps. It may begin 1 or 2 years after you first get your period. It may get better or go away as you get older or when you have a baby. The cramps are most often felt just before, or on the first day of your period. They may last 1 to 3 days. Treatment is with medicines and comfort measures as described below (see the  Home care  section).  Secondary dysmenorrhea may start later in life. It describes menstrual pain that occurs due to an underlying health problem. The pain may last longer than common menstrual cramps. It may also worsen over time. Some problems that can lead to secondary dysmenorrhea include:     Pelvic inflammatory disease (PID). Infection that involves the female reproductive organs, such as the uterus and fallopian tubes    Fibroids. Benign growths within the wall of the uterus (not cancer)    Endometriosis. Tissue that normally only lines the uterus also grows outside of it (because the abnormal tissue also swells and bleeds each month, it can cause pain)  Once the cause of secondary dysmenorrhea is found, it can be treated. Your healthcare provider will discuss options with you as needed. Your care may also include some of the treatments described below (see the  Home care  section).  Home care  Medicines  Certain medicines can help relieve  or prevent menstrual pain and cramping. These can include:    Nonsteroidal anti-inflammatory drugs (NSAIDs), such as ibuprofen    Prescription pain medicine, if needed    Hormone therapy (this includes most methods of hormonal birth control such as pills, shots, or a hormone-releasing IUD)  General care  To help relieve pain and cramping, try these tips:    Rest as needed.    Apply a heating pad to the lower belly or back as directed. A warm bath or massage to these areas may also help.    Exercise regularly. Many women find that being more active each week helps reduce pain and cramping.    Ask your healthcare provider for advice about other treatments you can try to help control pain and cramping.  Follow-up care  Follow up with your healthcare provider, or as advised.  When to seek medical advice  Call your healthcare provider right away if any of these occur:    Fever of 100.4 F (38 C) or higher, or as directed by your provider    Pain or cramping worsens or doesn t improve with medicine    Pain or cramping lasts longer than usual or occurs between periods    Unusual vaginal discharge between periods    Bleeding becomes heavy (soaking more than 1 pad or tampon every hour for 3 hours)    Passage of pink or gray tissue from the vagina  Date Last Reviewed: 10/1/2017    9733-5720 [employer]. [address] [place] [address], [place] [id number]. All rights reserved. This information is not intended as a substitute for professional medical care. Always follow your healthcare professional's instructions.          Anemia  Anemia is a condition that occurs when your body does not have enough healthy red blood cells (RBCs). RBCs are the parts of your blood that carry oxygen throughout your body. A protein called hemoglobin allows your RBCs to absorb and release oxygen. Without enough RBCs or hemoglobin, your body doesn't get enough oxygen. Symptoms of anemia may then occur.    What are the symptoms of anemia?  Some  people with anemia have no symptoms. But most people have symptoms that range from mild to severe. These can include:    Tiredness (fatigue)    Weakness    Pale skin    Shortness of breath    Dizziness or fainting    Rapid heartbeat    Trouble doing normal amounts of activity    Jaundice (yellowing of your eyes, skin, or mouth; dark urine)  What causes anemia?  Anemia can occur when your body:    Loses too much blood    Does not make enough RBCs    Destroys your RBCs at a faster rate than it can replace them    Does not make a normal amount of hemoglobin in your RBCs  These problems can occur for many reasons, including:    A condition that you are born with (congenital or inherited), such as sickle cell disease or thalassemia    Heavy bleeding for any reason, including injury, surgery, childbirth, or even heavy menstrual periods    Being low in certain nutrients, such as iron, folate, or vitamin B12, possibly from a poor diet or a condition like celiac disease or Crohn's disease    Certain chronic conditions like diabetes, arthritis, or kidney disease    Certain chronic infections like tuberculosis or HIV    Exposure to certain medicines, such as those used for chemotherapy  There are different types of anemia. Your healthcare provider can tell you more about the type of anemia you have and what may have caused it.  How is anemia diagnosed?  To diagnose anemia, your healthcare provider orders blood tests. These can include:    Complete blood cell count (CBC). This test measures the amounts of the different types of blood cells.    Blood smear. This test checks the size and shape of your blood cells. To do the test, a drop of your blood is viewed under a microscope. A stain is used to make the blood cells easier to see.    Iron studies. These tests measure the amount of iron in your blood. Your body needs iron to make hemoglobin in your RBCs.    Vitamin B12 and folate studies. These tests check for some of the  components that help give RBCs a normal size and shape.    Reticulocyte count. This test measures the amount of new RBCs that your bone marrow makes.    Hemoglobin electrophoresis. This test checks for problems with your hemoglobin in RBCs.  How is anemia treated?  Treatment for anemia is based on the type of anemia, its cause, and the severity of your symptoms. Treatments may include:    Diet changes. This involves increasing the amount of certain nutrients in your diet, such as iron, vitamin B12, or folate. Your healthcare provider may also prescribe nutrient supplements.    Medicines. Certain medicines treat the cause of your anemia. Others help build new RBCs or relieve symptoms. If a medicine is the cause of your anemia, you may need to stop or change it.    Blood transfusions. Replacing some of your blood can increase the number of healthy RBCs in your body.    Surgery. In some cases, your doctor may do surgery to treat the underlying cause of anemia. If you need surgery, your healthcare provider will explain the procedure and outline the risks and benefits for you.  What are the long-term concerns?  If you have a certain type of anemia, you can expect a full recovery after treatment. If you have other types of anemia (especially a type you're born with), you will need to manage it for life. Your doctor can tell you more.  Date Last Reviewed: 12/1/2016 2000-2017 [employer]. [address] [place] [address], [place] [address]. All rights reserved. This information is not intended as a substitute for professional medical care. Always follow your healthcare professional's instructions.

## 2018-06-11 ENCOUNTER — TELEPHONE (OUTPATIENT)
Dept: OPHTHALMOLOGY | Facility: CLINIC | Age: 23
End: 2018-06-11

## 2018-06-11 ENCOUNTER — OFFICE VISIT (OUTPATIENT)
Dept: OPHTHALMOLOGY | Facility: CLINIC | Age: 23
End: 2018-06-11
Attending: OPHTHALMOLOGY
Payer: COMMERCIAL

## 2018-06-11 DIAGNOSIS — S05.00XA ABRASION OF CONJUNCTIVA, UNSPECIFIED LATERALITY, INITIAL ENCOUNTER: ICD-10-CM

## 2018-06-11 DIAGNOSIS — H16.423 PANNUS (CORNEAL), BILATERAL: ICD-10-CM

## 2018-06-11 DIAGNOSIS — H04.123 DRY EYES, BILATERAL: ICD-10-CM

## 2018-06-11 DIAGNOSIS — H18.893 LIMBAL STEM CELL DEFICIENCY, BILATERAL: Primary | ICD-10-CM

## 2018-06-11 DIAGNOSIS — H16.9 KERATITIS: ICD-10-CM

## 2018-06-11 DIAGNOSIS — H11.133: ICD-10-CM

## 2018-06-11 PROCEDURE — G0463 HOSPITAL OUTPT CLINIC VISIT: HCPCS | Mod: ZF

## 2018-06-11 RX ORDER — OFLOXACIN 3 MG/ML
1-2 SOLUTION/ DROPS OPHTHALMIC 2 TIMES DAILY
Qty: 1 BOTTLE | Refills: 11 | Status: ON HOLD | OUTPATIENT
Start: 2018-06-11 | End: 2020-04-03

## 2018-06-11 ASSESSMENT — REFRACTION_WEARINGRX
OD_SPHERE: -1.50
OS_CYLINDER: +0.75
OD_AXIS: 35
OS_AXIS: 100
OS_SPHERE: -1.50
OD_CYLINDER: +1.00

## 2018-06-11 ASSESSMENT — CONF VISUAL FIELD
OD_NORMAL: 1
METHOD: COUNTING FINGERS
OS_NORMAL: 1

## 2018-06-11 ASSESSMENT — VISUAL ACUITY
OS_CC: 20/40
OD_CC: 20/50
OD_CC+: +2
CORRECTION_TYPE: GLASSES
OS_PH_CC: 20/30
METHOD: SNELLEN - LINEAR

## 2018-06-11 ASSESSMENT — TONOMETRY
OS_IOP_MMHG: 09
IOP_METHOD: ICARE
OD_IOP_MMHG: 12

## 2018-06-11 NOTE — NURSING NOTE
Chief Complaints and History of Present Illnesses   Patient presents with     Follow Up For     3 week follow up f/up for LSCD OU     HPI    Affected eye(s):  Both   Symptoms:     No floaters   No flashes   No redness   No tearing         Do you have eye pain now?:  No      Comments:  Pt states vision is the same as last visit. Burning and tearing in both eyes. Pt notes that when her eyes are burning she feels itching in the back of her throat.    Tatianna CASTILLO June 11, 2018 1:42 PM

## 2018-06-11 NOTE — TELEPHONE ENCOUNTER
Prior Authorization Retail Medication Request    Medication/Dose: OFLOXACIN OP 0.3% HAYES  ICD code (if different than what is on RX):  SEE CHART  Previously Tried and Failed:  SEE CHART  Rationale:  SEE CHART    Insurance Name:  [facility] BLUE PL[place] OF [place]  Insurance ID:  [id number]      Pharmacy Information (if different than what is on RX)  Name:  [facility]  Phone:  [phone]

## 2018-06-11 NOTE — MR AVS SNAPSHOT
After Visit Summary   6/11/2018    Chloe Cleaning    MRN: [id number]           Patient Information     Date Of Birth          1995        Visit Information        Provider Department      6/11/2018 1:15 PM Rey Gonzalez MD; LANGUAGE [facility]        Today's Diagnoses     Limbal stem cell deficiency, bilateral - Both Eyes    -  1      Care Instructions    Ofloxacin twice a day both eyes  Restasis twice a day both eyes   Preservative free artificial tears every hour both eyes            Follow-ups after your visit        Additional Services     NEPHROLOGY ADULT REFERRAL       Your provider has referred you to: [facility]: Kidney (Nephrology) Clinic [facility] [phone]   [email or web address]    Please be aware that coverage of these services is subject to the terms and limitations of your health insurance plan.  Call member services at your health plan with any benefit or coverage questions.      Reason for referral:  Referral for evaluation prior to keratolimbal allograft - patient will need to start a regiment of immunosuppression analogous to patients receiving kidney transplant (cellcept, tacrolimus, prednisone). Referred for consultation regarding immunosuppression and co-management. Please call Dr. Rey Gonzalez ([phone]) with any questions.    Please bring the following to your appointment:    >>   Any x-rays, CTs or MRIs which have been performed.  Contact the facility where they were done to arrange for  prior to your scheduled appointment.   >>   List of current medications   >>   This referral request   >>   Any documents/labs given to you for this referral                  Follow-up notes from your care team     Return in about 3 months (around 9/11/2018).      Your next 10 appointments already scheduled     Jul 23, 2018  9:00 AM CDT   RETURN CORNEA with Rey Gonzalez MD   Eye Clinic ([facility])    [place]  [address]  [address]  [facility] [address]   [phone]              Who to contact     Please call your clinic at [phone] to:    Ask questions about your health    Make or cancel appointments    Discuss your medicines    Learn about your test results    Speak to your doctor            Additional Information About Your Visit        MyChart Information     [employer] is an electronic gateway that provides easy, online access to your medical records. With [employer], you can request a clinic appointment, read your test results, renew a prescription or communicate with your care team.     To sign up for [employer] visit the website at [email or web address]   You will be asked to enter the access code listed below, as well as some personal information. Please follow the directions to create your username and password.     Your access code is: [email or web address]  Expires: 2018  6:31 AM     Your access code will  in 90 days. If you need help or a new code, please contact your [facility] or call [phone] for assistance.        Care EveryWhere ID     This is your Care EveryWhere ID. This could be used by other organizations to access your [place] medical records  [id number]         Blood Pressure from Last 3 Encounters:   18 106/78   18 114/80   17 112/62    Weight from Last 3 Encounters:   18 68.1 kg (150 lb 3.2 oz)   17 68 kg (150 lb)   17 68 kg (150 lb)              We Performed the Following     NEPHROLOGY ADULT REFERRAL          Today's Medication Changes          These changes are accurate as of 18  3:35 PM.  If you have any questions, ask your nurse or doctor.               Start taking these medicines.        Dose/Directions    ofloxacin 0.3 % ophthalmic solution   Commonly known as:  OCUFLOX   Used for:  Limbal stem cell deficiency, bilateral   Started by:  Rey Gonzalez MD         Dose:  1-2 drop   Place 1-2 drops into both eyes 2 times daily   Quantity:  1 Bottle   Refills:  11            Where to get your medicines      These medications were sent to [facility] [address] [place] [address]     Phone:  [phone]     ofloxacin 0.3 % ophthalmic solution                Primary Care Provider Fax #    Physician No Ref-Primary [phone]       No address on file        Equal Access to Services     DYLON LOU : Hadii aad ku hadasho Soomaali, waaxda luqadaha, qaybta kaalmada adeegyada, waxay idiin hayaan adeeg kharash la'michael . So [facility] [phone].    ATENCIÓN: Si habla español, tiene a de la cruz disposición servicios gratuitos de asistencia lingüística. [facility] [phone].    We comply with applicable federal civil rights laws and [place] laws. We do not discriminate on the basis of race, color, national origin, age, disability, sex, sexual orientation, or gender identity.            Thank you!     Thank you for choosing EYE CLINIC  for your care. Our goal is always to provide you with excellent care. Hearing back from our patients is one way we can continue to improve our services. Please take a few minutes to complete the written survey that you may receive in the mail after your visit with us. Thank you!             Your Updated Medication List - Protect others around you: Learn how to safely use, store and throw away your medicines at www.disposemymeds.org.          This list is accurate as of 6/11/18  3:35 PM.  Always use your most recent med list.                   Brand Name Dispense Instructions for use Diagnosis    Carboxymethylcellulose Sod PF 1 % ophthalmic solution    CELLUVISC/REFRESH LIQUIGEL    90 each    Place 1 drop into both eyes every hour (while awake) Dispose of dropperette within 24 hours after opening or if the tip is contaminated.    Limbal stem cell deficiency, bilateral, Dry eyes, bilateral        cycloSPORINE 0.05 % ophthalmic emulsion    RESTASIS    30 each    Place 1 drop into both eyes 2 times daily    Limbal stem cell deficiency, bilateral, Dry eyes, bilateral       hypromellose 0.3 % Gel ophthalmic gel    GENTEAL    10 g    Place 0.1 g (2 drops) into both eyes At Bedtime Apply approximately a half inch ribbon of ointment to the inside of your lower lids. Warning, application of the ointment to your eyes will cause temporary blurring of your vision from the ointment coating your eye. Please apply right before bedtime.    Limbal stem cell deficiency, bilateral       ofloxacin 0.3 % ophthalmic solution    OCUFLOX    1 Bottle    Place 1-2 drops into both eyes 2 times daily    Limbal stem cell deficiency, bilateral       traMADol 50 MG tablet    ULTRAM    10 tablet    Take 1 tablet (50 mg) by mouth every 6 hours as needed for severe pain        TYLENOL PO      Take by mouth every 4 hours as needed

## 2018-06-11 NOTE — PATIENT INSTRUCTIONS
Ofloxacin twice a day both eyes  Restasis twice a day both eyes   Preservative free artificial tears every hour both eyes

## 2018-06-11 NOTE — PROGRESS NOTES
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      Interval history: patient reports sharp pain in both eyes for 2 weeks and is having trouble sleeping due to pain. Pain worsens if she tries to read without glasses. Feels as if eyes are on fire. Vision improved with glasses. Persistent photophobia.      Previously using:  PFATs four times a day both eyes   Restasis daily      Assessment and Plan:      1. 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 Baseline slit lamp photos 11/2015.   - DDx includes possible trachoma given mild subepithelial upper tarsal sub epithelial fibrosis and superior tarsal follicles   - no conj epi defect on exam today.LSCD appears worsening   - minimize all ocular surface toxicity   - RE > LE peripheral KNV with central subepithelial scarring RIGHT eye and KNV   - s/p lid surgery for lid retraction   - unable to wear scleral lens due to Bell's per Dr. Mac   -replaced collagen punctal plugs BILATERAL LOWER EYELIDS 5/21/18   - increase Preservative free artificial tears Q1H both eyes   -consider starting refresh pm ointment at bedtime both eyes    - continue Restasis OU twice a day    - patient may ultimately need limbal stem cell transplant OD, could consider KLAL vs SLET with systemic immunosuppression   - repeat trial of BCL both eyes with Kontur   -start Ofloxacin twice a day OU   - referral to renal/transplant medicine physician - for discussion of immunosuppression and to initiate co-management    RTC 3 months    Justin Stout, DO  [facility] Fellow      ~~~~~~~~~~~~~~~~~~~~~~~~~~~~~~~~~~~~~~~~~~~~~~~~~~~~~~~~~~~~~~~~    Complete documentation of historical and exam elements from today's encounter can be found in the full encounter summary report (not reduplicated in this progress note). I personally obtained the chief complaint(s) and history of present illness.  I confirmed and edited as necessary the review  of systems, past medical/surgical history, family history, social history, and examination findings as documented by others; and I examined the patient myself. I personally reviewed the relevant tests, images, and reports as documented above. I formulated and edited as necessary the assessment and plan and discussed the findings and management plan with the patient and family.    Rey Gonzalez MD    I personally spent great than 40min with the patient, of which >50% of the time was spent face to face with the patient, counseling and coordinating care with the patient. We discussed the complexity of her diagnosis, the need for further information prior to proceeding with yet another surgery, and the unknown prognosis for the patient at this time. We discussed the pros and cons of Keratoprosthesis versus keratolimbal allograft surgery - including infectious, glaucoma, retinal detachment, immunosuppression, rejection, etc.    Rey Gonzalez MD

## 2018-06-14 ENCOUNTER — TELEPHONE (OUTPATIENT)
Dept: OPHTHALMOLOGY | Facility: CLINIC | Age: 23
End: 2018-06-14

## 2018-06-14 NOTE — TELEPHONE ENCOUNTER
Central Prior Authorization Team   Phone: [phone]      PA Initiation    Medication: OFLOXACIN OP 0.3% HAYES  Insurance Company: [employer] Clinical Review - Phone [phone] Fax [phone]  Pharmacy Filling the Rx: [facility] [address] [place] [address] - [address] [place] [address]  Filling Pharmacy Phone: [phone]  Filling Pharmacy Fax:    Start Date: 6/14/2018

## 2018-06-14 NOTE — TELEPHONE ENCOUNTER
Left message at 0942    Reviewed Rx sent to pt's [facility] pharmacy 6-11-18  Asked to contact pt's pharmacy for pickup    Provided direct triage number for any further assistance.  Tez Bello RN 9:36 AM 06/14/18

## 2018-06-14 NOTE — TELEPHONE ENCOUNTER
[facility] Call Center    Phone Message    May a detailed message be left on voicemail: no    Reason for Call: Other: Pt sister calling asking for a call back about status of Rx. OFLOXACIN OP 0.3% HAYES. Please call sister for pt needs .      Action Taken: Message routed to:  [facility]

## 2018-06-19 NOTE — TELEPHONE ENCOUNTER
PRIOR AUTHORIZATION DENIED    Medication: OFLOXACIN OP 0.3% HAYES - denied     Denial Date: 6/15/2018    Denial Rational: patient must try and fail the following      Appeal Information:

## 2019-04-01 ENCOUNTER — OFFICE VISIT (OUTPATIENT)
Dept: OPHTHALMOLOGY | Facility: CLINIC | Age: 24
End: 2019-04-01
Attending: OPTOMETRIST
Payer: COMMERCIAL

## 2019-04-01 DIAGNOSIS — H16.423 PANNUS (CORNEAL), BILATERAL: ICD-10-CM

## 2019-04-01 DIAGNOSIS — H18.893 LIMBAL STEM CELL DEFICIENCY, BILATERAL: Primary | ICD-10-CM

## 2019-04-01 DIAGNOSIS — H04.123 DRY EYES, BILATERAL: ICD-10-CM

## 2019-04-01 DIAGNOSIS — H18.893 LIMBAL STEM CELL DEFICIENCY, BILATERAL: ICD-10-CM

## 2019-04-01 PROCEDURE — 92285 EXTERNAL OCULAR PHOTOGRAPHY: CPT | Mod: ZF | Performed by: OPTOMETRIST

## 2019-04-01 PROCEDURE — G0463 HOSPITAL OUTPT CLINIC VISIT: HCPCS

## 2019-04-01 RX ORDER — OFLOXACIN 3 MG/ML
1-2 SOLUTION/ DROPS OPHTHALMIC 2 TIMES DAILY
Qty: 1 BOTTLE | Refills: 11 | Status: ON HOLD | OUTPATIENT
Start: 2019-04-01 | End: 2020-04-03

## 2019-04-01 RX ORDER — CARBOXYMETHYLCELLULOSE SODIUM 5 MG/ML
1 SOLUTION/ DROPS OPHTHALMIC
Qty: 1 EACH | Refills: 11 | Status: SHIPPED | OUTPATIENT
Start: 2019-04-01 | End: 2024-01-02

## 2019-04-01 RX ORDER — CYCLOSPORINE 0.5 MG/ML
1 EMULSION OPHTHALMIC 2 TIMES DAILY
Qty: 1 EACH | Refills: 11 | Status: SHIPPED | OUTPATIENT
Start: 2019-04-01 | End: 2019-04-03

## 2019-04-01 ASSESSMENT — VISUAL ACUITY
OD_CC: 20/40
OS_PH_CC: 20/25
METHOD: SNELLEN - LINEAR
OS_CC: 20/40+2

## 2019-04-01 ASSESSMENT — TONOMETRY
OS_IOP_MMHG: 17
OD_IOP_MMHG: 17
IOP_METHOD: APPLANATION

## 2019-04-01 ASSESSMENT — CONF VISUAL FIELD
OD_NORMAL: 1
OS_NORMAL: 1

## 2019-04-01 ASSESSMENT — SLIT LAMP EXAM - LIDS
COMMENTS: PROTECTIVE PTOSIS
COMMENTS: PROTECTIVE PTOSIS

## 2019-04-01 NOTE — PROGRESS NOTES
HPI:  Patient complains of eye pain in both eyes for more than ten years now. Eye pain is worst in the summer. The symptoms have been stable. Patient has not seen Dr. Gonzalez for awhile and she is interested in re-filling her medications      Pertinent Medical History:    Multiple sclerosis    Ocular History:    Eyelid surgery right eye 06/13/2017.    Eye Medications:    Preservative free artificial tears QID both eyes.     Restasis BID both eyes    Ofloxacin BID both eyes    Preservative free artificial tears q1hr both eyes    Punctal plugs bilaeral lower eyelids 05/21/18    Assessment and Plan:  1.   Limbal Stem Cell Deficiency, both eyes.     Differential diagnosis includes possible trachoma given mild subepithelial upper tarsal sub-epitheilial fibrosis and superior tarsal follicles.     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     Patient to see renal/transplant medicine doctor for the discussion of immunosuppression and to initiate co-management.     Continue preservative free artificial tears 4-5x/day both eyes    Continue restasis BID both eyes    Continue ofloxacin BID both eyes.       Medical History:  Past Medical History:   Diagnosis Date     Conjunctival melanosis, bilateral      Dry eye      Keratitis      Limbal stem cell deficiency      Pannus (corneal), bilateral        Medications:  Current Outpatient Medications   Medication Sig Dispense Refill     Acetaminophen (TYLENOL PO) Take by mouth every 4 hours as needed        Carboxymethylcellulose Sod PF (CELLUVISC/REFRESH LIQUIGEL) 1 % ophthalmic solution Place 1 drop into both eyes every  hour (while awake) Dispose of dropperette within 24 hours after opening or if the tip is contaminated. 90 each 4     cycloSPORINE (RESTASIS) 0.05 % ophthalmic emulsion Place 1 drop into both eyes 2 times daily 30 each 3     hypromellose (GENTEAL) ophthalmic gel 0.3% Place 0.1 g (2 drops) into both eyes At Bedtime Apply approximately a half inch ribbon of ointment to the inside of your lower lids.  Warning, application of the ointment to your eyes will cause temporary blurring of your vision from the ointment coating your eye.  Please apply right before bedtime. 10 g 11     ofloxacin (OCUFLOX) 0.3 % ophthalmic solution Place 1-2 drops into both eyes 2 times daily 1 Bottle 11     traMADol (ULTRAM) 50 MG tablet Take 1 tablet (50 mg) by mouth every 6 hours as needed for severe pain 10 tablet 0

## 2019-04-02 ENCOUNTER — TELEPHONE (OUTPATIENT)
Dept: OPHTHALMOLOGY | Facility: CLINIC | Age: 24
End: 2019-04-02

## 2019-04-02 DIAGNOSIS — H18.893 LIMBAL STEM CELL DEFICIENCY, BILATERAL: ICD-10-CM

## 2019-04-02 DIAGNOSIS — H16.423 PANNUS (CORNEAL), BILATERAL: ICD-10-CM

## 2019-04-02 DIAGNOSIS — H04.123 DRY EYES, BILATERAL: ICD-10-CM

## 2019-04-02 NOTE — TELEPHONE ENCOUNTER
Dispense 1 each on previous script-- updated Rx for 60 each with 11 refills and escribed to allan Bello RN 7:35 AM 04/03/19          [facility] Call Center    Phone Message    May a detailed message be left on voicemail: no    Reason for Call: Other: [facility] in [place] is calling about cycloSPORINE (RESTASIS) 0.05 % ophthalmic emulsion. They need to clarify the dosage. Please call [phone]. to discuss. Thanks,     Action Taken: Message routed to:  [facility] gen

## 2019-04-03 ENCOUNTER — TELEPHONE (OUTPATIENT)
Dept: OPHTHALMOLOGY | Facility: CLINIC | Age: 24
End: 2019-04-03

## 2019-04-03 DIAGNOSIS — H16.423 PANNUS (CORNEAL), BILATERAL: ICD-10-CM

## 2019-04-03 DIAGNOSIS — H18.893 LIMBAL STEM CELL DEFICIENCY, BILATERAL: ICD-10-CM

## 2019-04-03 DIAGNOSIS — H04.123 DRY EYES, BILATERAL: ICD-10-CM

## 2019-04-03 RX ORDER — CYCLOSPORINE 0.5 MG/ML
1 EMULSION OPHTHALMIC 2 TIMES DAILY
Qty: 60 EACH | Refills: 11 | Status: SHIPPED | OUTPATIENT
Start: 2019-04-03 | End: 2019-04-03

## 2019-04-03 RX ORDER — CYCLOSPORINE 0.5 MG/ML
1 EMULSION OPHTHALMIC 2 TIMES DAILY
Qty: 60 EACH | Refills: 11 | Status: SHIPPED | OUTPATIENT
Start: 2019-04-03 | End: 2020-06-17

## 2019-04-03 NOTE — TELEPHONE ENCOUNTER
Rx sent to [facility] per request instead of [facility] ([facility] did not cover pt's insurance per message)  Tez Bello RN 9:18 AM 04/03/19        [facility] Call Center    Phone Message    May a detailed message be left on voicemail: yes    Reason for Call: Medication Question or concern regarding medication   Prescription Clarification  Name of Medication: cycloSPORINE (RESTASIS) 0.05 % ophthalmic emulsion  Prescribing Provider: Dr. Herbert   Pharmacy: [facility], [address] [place] [address]   What on the order needs clarification? [facility] pharmacy called stating they do not accept pt's insurance, so please send this prescription to [facility] listed.          Action Taken: Message routed to:  [facility] Eye

## 2019-04-04 ENCOUNTER — TELEPHONE (OUTPATIENT)
Dept: OPHTHALMOLOGY | Facility: CLINIC | Age: 24
End: 2019-04-04

## 2019-04-05 ENCOUNTER — TELEPHONE (OUTPATIENT)
Dept: OPHTHALMOLOGY | Facility: CLINIC | Age: 24
End: 2019-04-05

## 2019-04-05 NOTE — TELEPHONE ENCOUNTER
Prior Authorization Retail Medication Request    Medication/Dose: cycloSPORINE (RESTASIS) 0.05 % ophthalmic emulsion/ Place 1 drop into both eyes 2 times daily - Both Eyes  ICD code (if different than what is on RX):  See chart  Previously Tried and Failed:  See chart  Rationale:  See chart    Insurance Name:  [employer] [facility] AND [employer] ([employer])  Insurance ID:  [id number]      Pharmacy Information (if different than what is on RX)  Name:  [facility]  Phone:  [phone]    M: [id number]

## 2019-04-05 NOTE — TELEPHONE ENCOUNTER
DUPLICATE REQUEST    Medication: cycloSPORINE (RESTASIS) 0.05 % ophthalmic emulsion - DUPLICATE REQUEST  Insurance Company:    Expected CoPay:      Pharmacy Filling the Rx:    Pharmacy Notified:    Patient Notified:

## 2019-09-24 DIAGNOSIS — O36.80X0 PREGNANCY WITH INCONCLUSIVE FETAL VIABILITY: Primary | ICD-10-CM

## 2019-09-24 NOTE — PROGRESS NOTES
Pt has NOB scheduled with ultrasound. Needed order to be placed. Future ultrasound order placed and linked with appt. Closing encounter.   Iris Silveira RN on 9/24/2019 at 11:18 AM

## 2019-09-25 ENCOUNTER — ANCILLARY PROCEDURE (OUTPATIENT)
Dept: ULTRASOUND IMAGING | Facility: CLINIC | Age: 24
End: 2019-09-25
Payer: COMMERCIAL

## 2019-09-25 ENCOUNTER — PRENATAL OFFICE VISIT (OUTPATIENT)
Dept: MIDWIFE SERVICES | Facility: CLINIC | Age: 24
End: 2019-09-25
Payer: COMMERCIAL

## 2019-09-25 VITALS
SYSTOLIC BLOOD PRESSURE: 110 MMHG | DIASTOLIC BLOOD PRESSURE: 72 MMHG | HEIGHT: 61 IN | BODY MASS INDEX: 26.24 KG/M2 | WEIGHT: 139 LBS | HEART RATE: 82 BPM

## 2019-09-25 DIAGNOSIS — O09.91 SUPERVISION OF HIGH RISK PREGNANCY IN FIRST TRIMESTER: Primary | ICD-10-CM

## 2019-09-25 DIAGNOSIS — O99.611 CONSTIPATION DURING PREGNANCY IN FIRST TRIMESTER: ICD-10-CM

## 2019-09-25 DIAGNOSIS — K59.00 CONSTIPATION DURING PREGNANCY IN FIRST TRIMESTER: ICD-10-CM

## 2019-09-25 DIAGNOSIS — Z86.2 HX OF MICROCYTIC HYPOCHROMIC ANEMIA: ICD-10-CM

## 2019-09-25 DIAGNOSIS — O36.80X0 PREGNANCY WITH INCONCLUSIVE FETAL VIABILITY: ICD-10-CM

## 2019-09-25 PROBLEM — Z67.91 RH NEGATIVE STATE IN ANTEPARTUM PERIOD: Status: ACTIVE | Noted: 2019-09-25

## 2019-09-25 PROBLEM — O26.899 RH NEGATIVE STATE IN ANTEPARTUM PERIOD: Status: ACTIVE | Noted: 2019-09-25

## 2019-09-25 PROBLEM — O09.90 SUPERVISION OF HIGH-RISK PREGNANCY: Status: ACTIVE | Noted: 2019-09-25

## 2019-09-25 LAB
ABO + RH BLD: NORMAL
ABO + RH BLD: NORMAL
ALBUMIN UR-MCNC: NEGATIVE MG/DL
APPEARANCE UR: CLEAR
BILIRUB UR QL STRIP: NEGATIVE
BLD GP AB SCN SERPL QL: NORMAL
BLOOD BANK CMNT PATIENT-IMP: NORMAL
COLOR UR AUTO: YELLOW
ERYTHROCYTE [DISTWIDTH] IN BLOOD BY AUTOMATED COUNT: 19.1 % (ref 10–15)
GLUCOSE UR STRIP-MCNC: NEGATIVE MG/DL
HCT VFR BLD AUTO: 34.8 % (ref 35–47)
HGB BLD-MCNC: 11.1 G/DL (ref 11.7–15.7)
HGB UR QL STRIP: NEGATIVE
KETONES UR STRIP-MCNC: NEGATIVE MG/DL
LEUKOCYTE ESTERASE UR QL STRIP: NEGATIVE
MCH RBC QN AUTO: 23.5 PG (ref 26.5–33)
MCHC RBC AUTO-ENTMCNC: 31.9 G/DL (ref 31.5–36.5)
MCV RBC AUTO: 74 FL (ref 78–100)
NITRATE UR QL: NEGATIVE
PH UR STRIP: 6 PH (ref 5–7)
PLATELET # BLD AUTO: 225 10E9/L (ref 150–450)
RBC # BLD AUTO: 4.72 10E12/L (ref 3.8–5.2)
SOURCE: NORMAL
SP GR UR STRIP: 1.02 (ref 1–1.03)
SPECIMEN EXP DATE BLD: NORMAL
UROBILINOGEN UR STRIP-ACNC: 1 EU/DL (ref 0.2–1)
WBC # BLD AUTO: 6.5 10E9/L (ref 4–11)

## 2019-09-25 PROCEDURE — 87086 URINE CULTURE/COLONY COUNT: CPT | Performed by: ADVANCED PRACTICE MIDWIFE

## 2019-09-25 PROCEDURE — 86901 BLOOD TYPING SEROLOGIC RH(D): CPT | Performed by: ADVANCED PRACTICE MIDWIFE

## 2019-09-25 PROCEDURE — 84443 ASSAY THYROID STIM HORMONE: CPT | Performed by: ADVANCED PRACTICE MIDWIFE

## 2019-09-25 PROCEDURE — 86780 TREPONEMA PALLIDUM: CPT | Performed by: ADVANCED PRACTICE MIDWIFE

## 2019-09-25 PROCEDURE — 87389 HIV-1 AG W/HIV-1&-2 AB AG IA: CPT | Performed by: ADVANCED PRACTICE MIDWIFE

## 2019-09-25 PROCEDURE — 82306 VITAMIN D 25 HYDROXY: CPT | Performed by: ADVANCED PRACTICE MIDWIFE

## 2019-09-25 PROCEDURE — 87340 HEPATITIS B SURFACE AG IA: CPT | Performed by: ADVANCED PRACTICE MIDWIFE

## 2019-09-25 PROCEDURE — 99000 SPECIMEN HANDLING OFFICE-LAB: CPT | Performed by: ADVANCED PRACTICE MIDWIFE

## 2019-09-25 PROCEDURE — 99207 ZZC FIRST OB VISIT: CPT | Performed by: ADVANCED PRACTICE MIDWIFE

## 2019-09-25 PROCEDURE — 86900 BLOOD TYPING SEROLOGIC ABO: CPT | Performed by: ADVANCED PRACTICE MIDWIFE

## 2019-09-25 PROCEDURE — 86762 RUBELLA ANTIBODY: CPT | Performed by: ADVANCED PRACTICE MIDWIFE

## 2019-09-25 PROCEDURE — 76817 TRANSVAGINAL US OBSTETRIC: CPT | Performed by: OBSTETRICS & GYNECOLOGY

## 2019-09-25 PROCEDURE — 81003 URINALYSIS AUTO W/O SCOPE: CPT | Performed by: ADVANCED PRACTICE MIDWIFE

## 2019-09-25 PROCEDURE — 83021 HEMOGLOBIN CHROMOTOGRAPHY: CPT | Mod: 90 | Performed by: ADVANCED PRACTICE MIDWIFE

## 2019-09-25 PROCEDURE — 86850 RBC ANTIBODY SCREEN: CPT | Performed by: ADVANCED PRACTICE MIDWIFE

## 2019-09-25 PROCEDURE — 85027 COMPLETE CBC AUTOMATED: CPT | Performed by: ADVANCED PRACTICE MIDWIFE

## 2019-09-25 PROCEDURE — 86787 VARICELLA-ZOSTER ANTIBODY: CPT | Performed by: ADVANCED PRACTICE MIDWIFE

## 2019-09-25 PROCEDURE — 36415 COLL VENOUS BLD VENIPUNCTURE: CPT | Performed by: ADVANCED PRACTICE MIDWIFE

## 2019-09-25 RX ORDER — PNV NO.95/FERROUS FUM/FOLIC AC 28MG-0.8MG
1 TABLET ORAL DAILY
Qty: 90 TABLET | Refills: 3 | Status: SHIPPED | OUTPATIENT
Start: 2019-09-25 | End: 2020-03-24

## 2019-09-25 RX ORDER — DOCUSATE SODIUM 100 MG/1
100 CAPSULE, LIQUID FILLED ORAL 2 TIMES DAILY PRN
Qty: 60 CAPSULE | Refills: 0 | Status: SHIPPED | OUTPATIENT
Start: 2019-09-25 | End: 2019-12-19

## 2019-09-25 ASSESSMENT — ANXIETY QUESTIONNAIRES
6. BECOMING EASILY ANNOYED OR IRRITABLE: NOT AT ALL
3. WORRYING TOO MUCH ABOUT DIFFERENT THINGS: NOT AT ALL
5. BEING SO RESTLESS THAT IT IS HARD TO SIT STILL: NOT AT ALL
2. NOT BEING ABLE TO STOP OR CONTROL WORRYING: NOT AT ALL
1. FEELING NERVOUS, ANXIOUS, OR ON EDGE: NOT AT ALL
GAD7 TOTAL SCORE: 0
7. FEELING AFRAID AS IF SOMETHING AWFUL MIGHT HAPPEN: NOT AT ALL
IF YOU CHECKED OFF ANY PROBLEMS ON THIS QUESTIONNAIRE, HOW DIFFICULT HAVE THESE PROBLEMS MADE IT FOR YOU TO DO YOUR WORK, TAKE CARE OF THINGS AT HOME, OR GET ALONG WITH OTHER PEOPLE: NOT DIFFICULT AT ALL

## 2019-09-25 ASSESSMENT — PATIENT HEALTH QUESTIONNAIRE - PHQ9
SUM OF ALL RESPONSES TO PHQ QUESTIONS 1-9: 5
5. POOR APPETITE OR OVEREATING: NOT AT ALL

## 2019-09-25 ASSESSMENT — MIFFLIN-ST. JEOR: SCORE: 1312.56

## 2019-09-25 NOTE — PROGRESS NOTES
SUBJECTIVE:     HPI:    This is a 24 year old female patient,  who presents for her first obstetrical visit.    DOM: 2020, by Ultrasound.  She is 12w6d weeks.  Her cycles are regular.  Her last menstrual period was normal.   Since her LMP, she has experienced  nausea and emesis).   She denies abdominal pain, fatigue, headache, loss of appetite, vaginal discharge, dysuria, pelvic pain, urinary urgency, lightheadedness, urinary frequency, vaginal bleeding, hemorrhoids and constipation.    Additional History:   1) Chloe is originally from [place] [facility] (lived in [place] for a while). She is here with her mother and an . Her  is living in [place] and will probably not be here for the birth. She is unsure if she will return or continue to live in [place].    2) She currently has [facility] for insurance. She is unsure is she has other options for insurance.    3) She has a significant history for eye surgery due to limbal stem cell deficiency, bilaterally. She sees Dr. Herbert in Opthalmology for the condition.     4) Has had constipation during pregnancy.    5) Her problem list shows she has Multiple sclerosis (noted on 9/22/15). Records show infusions with that dx around that time, but I could find no other office visits that discuss this dx. She is unsure about the diagnosis.     6) Her mother states Chloe has allergies and wants us to test for allergies. When I asked what type of allergies and to what things, she said she didn't know. A doctor in [facility] tested her blood and said she had allergies a long time ago. I asked what her reactions were and she said her daughter's skin gets irritated. I asked the last time this occurred. It has not occurred for a long time according to her mother.     Have you travelled during the pregnancy?No  Have your sexual partner(s) travelled during the pregnancy?No      HISTORY:   Planned Pregnancy: Yes  Marital Status: Single  Occupation:  "unemployed  Living in Household: Other:  mother    Past History:  Her past medical history   Past Medical History:   Diagnosis Date     Conjunctival melanosis, bilateral      Dry eye      Keratitis      Limbal stem cell deficiency      Microcytic anemia      Migraines      Pannus (corneal), bilateral    .      She has a history of  first pregnancy    Since her last LMP she denies use of alcohol, tobacco and street drugs.    Past medical, surgical, social and family history were reviewed and updated in [facility].        Current Outpatient Medications   Medication     Acetaminophen (TYLENOL PO)     carboxymethylcellulose (REFRESH PLUS) 0.5 % SOLN ophthalmic solution     Carboxymethylcellulose Sod PF (CELLUVISC/REFRESH LIQUIGEL) 1 % ophthalmic solution     cycloSPORINE (RESTASIS) 0.05 % ophthalmic emulsion     docusate sodium (COLACE) 100 MG capsule     hypromellose (GENTEAL) ophthalmic gel 0.3%     ofloxacin (OCUFLOX) 0.3 % ophthalmic solution     ofloxacin (OCUFLOX) 0.3 % ophthalmic solution     Prenatal Vit-Fe Fumarate-FA (PRENATAL VITAMIN AND MINERAL) 28-0.8 MG TABS     traMADol (ULTRAM) 50 MG tablet     No current facility-administered medications for this visit.        ROS:   12 point review of systems negative other than symptoms noted below.  Gastrointestinal: Nausea and Vomiting      OBJECTIVE:     EXAM:  /72 (BP Location: Right arm, Patient Position: Sitting, Cuff Size: Adult Regular)   Pulse 82   Ht 1.549 m (5' 0.98\")   Wt 63 kg (139 lb)   LMP 07/05/2019   BMI 26.28 kg/m   Body mass index is 26.28 kg/m .    GENERAL: healthy, alert and no distress  EYES: Eyes grossly normal to inspection - indented at temple with droopy eyelids. She was able to visually tract me when I talked and moved around.   NECK: no adenopathy, no asymmetry, masses, or scars  RESP: unlabored breathing  MS: no gross musculoskeletal defects noted, no edema  SKIN: no suspicious lesions or rashes  NEURO: Normal strength and tone, " mentation intact and speech normal  PSYCH: mentation appears normal    ASSESSMENT/PLAN:       ICD-10-CM    1. Supervision of high risk pregnancy in first trimester O09.91 ABO/Rh type and screen     Hepatitis B surface antigen     CBC with platelets     HIV Antigen Antibody Combo     Rubella Antibody IgG Quantitative     Treponema Abs w Reflex to RPR and Titer     Urine Culture Aerobic Bacterial     *UA reflex to Microscopic     Prenatal Vit-Fe Fumarate-FA (PRENATAL VITAMIN AND MINERAL) 28-0.8 MG TABS     docusate sodium (COLACE) 100 MG capsule     TSH     Vitamin D Deficiency     Varicella Zoster Virus Antibody IgG   2. Constipation during pregnancy in first trimester O99.611 docusate sodium (COLACE) 100 MG capsule    K59.00    3. Hx of microcytic hypochromic anemia Z86.2 HGB Eval Reflex to ELP or RBC Solubility       24 year old , 12w6d weeks of pregnancy with DOM of 2020, by Ultrasound    Discussed as follows:    Chloe declines NIPT testing.    Pap is due. Either offer it at next visit or postpartum.     I explained that we do not test for allergies in this clinic and she would have to see an allergist. Allergy screening cannot be done in pregnancy. If an allergic reaction occurs in pregnancy, she will follow up with us. Otherwise we can reevaluate her need for a referral after pregnancy.     We discussed her ultrasound results. No follow up was indicated for the right ovarian cyst. We discussed if pain develops, then she needs to call us.     Plan to have Renuka Stout (care coordinator) meet with her at her next visit.     Counseling given:   - Follow up in 4-6 weeks for return OB visit.  - Recommended weight gain for pregnancy: 25-35 lbs.         PLAN/PATIENT INSTRUCTIONS:    New OB Handout Booklet     INA Cottrell CNM

## 2019-09-25 NOTE — RESULT ENCOUNTER NOTE
Results discussed directly with patient by Tamie Mariee CNM while patient was present Any further details documented in the note.     INA Claudio, KENIA

## 2019-09-26 LAB
BACTERIA SPEC CULT: NO GROWTH
DEPRECATED CALCIDIOL+CALCIFEROL SERPL-MC: 6 UG/L (ref 20–75)
HBV SURFACE AG SERPL QL IA: NONREACTIVE
HIV 1+2 AB+HIV1 P24 AG SERPL QL IA: NONREACTIVE
Lab: NORMAL
RUBV IGG SERPL IA-ACNC: 96 IU/ML
SPECIMEN SOURCE: NORMAL
T PALLIDUM AB SER QL: NONREACTIVE
TSH SERPL DL<=0.005 MIU/L-ACNC: 0.94 MU/L (ref 0.4–4)
VZV IGG SER QL IA: 3.1 AI (ref 0–0.8)

## 2019-09-26 ASSESSMENT — ANXIETY QUESTIONNAIRES: GAD7 TOTAL SCORE: 0

## 2019-09-27 ENCOUNTER — TELEPHONE (OUTPATIENT)
Dept: OBGYN | Facility: CLINIC | Age: 24
End: 2019-09-27

## 2019-09-27 DIAGNOSIS — E55.9 VITAMIN D DEFICIENCY: ICD-10-CM

## 2019-09-27 DIAGNOSIS — Z67.91 RH NEGATIVE STATE IN ANTEPARTUM PERIOD: ICD-10-CM

## 2019-09-27 DIAGNOSIS — O26.899 RH NEGATIVE STATE IN ANTEPARTUM PERIOD: ICD-10-CM

## 2019-09-27 DIAGNOSIS — O09.91 SUPERVISION OF HIGH RISK PREGNANCY IN FIRST TRIMESTER: Primary | ICD-10-CM

## 2019-09-27 LAB
HGB A1 MFR BLD: 97.1 % (ref 95–97.9)
HGB A2 MFR BLD: 2.6 % (ref 2–3.5)
HGB C MFR BLD: 0 % (ref 0–0)
HGB E MFR BLD: 0 % (ref 0–0)
HGB F MFR BLD: 0.3 % (ref 0–2.1)
HGB FRACT BLD ELPH-IMP: NORMAL
HGB OTHER MFR BLD: 0 % (ref 0–0)
HGB S BLD QL SOLY: NORMAL
HGB S MFR BLD: 0 % (ref 0–0)
PATH INTERP BLD-IMP: NORMAL

## 2019-10-16 NOTE — PATIENT INSTRUCTIONS
Constipation    Constipation can be caused by many factors such as poor diet, lack of activity, and medications. Often times women experience more constipation during pregnancy due to decreased intestinal motility.    DRINK TONS OF WATER! 2.5 liters (4 pints) of water per day      Activity is very important. Increase your daily activity to at least 20-60 minutes. This increases blood flow to your gut and improves bowel function    Limit caffeine and alcohol intake    Avoid foods you've identified as constipating    Increase fiber intake: there are ways to increase you fiber through your diet but there are also OTC agents such as Metamucil or Benfiber that you can supplement your diet with    Use probiotics such as Florastor and/or prebiotics such as oligosaccharides    Consider using an Omega 3 supplement, flaxseed and stephane seeds are great sources    Plan adequate time for elimination, it is most effective right after activity, and it may be beneficial to plan a consistent time daily    Food Suggestions      Eat beans, nuts, whole grain breads and cereals, oats, barley, figs, apples with skin, raisins, green leafy vegetables, fresh and dried fruits (especially grapes), prunes or prune juice    Eat popcorn nightly    Eat 2-3 salads per day    Add a pinch of cayenne pepper to food    1-2 tbsp of olive oil per day    Lemon juice and/or honey in warm water every morning before breakfast    Decrease red meat, refined white flour products like white rice, white bread and pasta    Tea infusions of: rosemary, chamomile, lemon balm, senna leaf, alfalfa, fennel seeds, lavender, cascara, dandelion, licorice root tea, psyllium seeds, marshmallow root    Other remedies      Increase Vitamin B and E (800 IU if not pregnant, 400 IU if pregnant per day) and potassium, calcium, and magnesium supplements      If these remedies fail, medications are an option as well. Please talk with your midwife if you continue to struggle with  constipation. If you are pregnant please double check with us before starting any medications!    Fiber Facts    A daily intake of about 25-30 grams of fiber is recommended. Most Americans only get about 12 grams of fiber on average. Fiber is very important in the diet to promote bowel regularity, lower cholesterol, lower risk of developing type 2 diabetes, and decrease chance of weight gain.  In pregnancy your intestinal motility slows down, so fiber is even more important.    Start gradually increasing fiber intake to reduce the risk of bloating and gas. Increase by about 5 grams a day every few days until you reach your fiber goals!    Food      Amount   Grams of Fiber  Grains  Dunnegan's All Bran Cereal   1/2 cup   10  Natural Oven's Stay Trim Bread 1 slice    5  Brown Rice (cooked)  1 cup    4  Cheerios    1 cup    3  Whole Wheat Crackers  5 crackers   3.5  Rye crackers    3 crackers   3    Cereals and whole grains are excellent ways to increase fiber in your diet. Try to find cereals that have at least 8 grams of fiber per serving.    Fruits  Blackberries     1 cup    7   Figs      3 dried   7  Apple      1     4  Pear     1    4   Orange    1    3    Vegetables  Winter squash   1 cup    9  Broccoli     1 cup    4  Corn      1 cup    4  Swiss chard (cooked)  1 cup     4  Cauliflower     1 cup     3  Potato     1 large w/skin  5    Beans  Kidney, red    1/2 cup   8  Lentils     1/2 cup cooked  8  Black     1/2 cup    7  Navy     1/2 cup   7  Evans     1/2 cup   7  White      1/2 cup   6  Garbanzo/Chickpeas  1/2 cup   5

## 2019-10-16 NOTE — PROGRESS NOTES
Here with Martiniquais .   Feels well other than continuing to have nausea and vomiting. Is vomiting once every morning. Able to keep food and fluids down, feels like she has a good appetite. She would like an Rx for Unisom/B6- ordered sent to pharmacy. Discussed diet and increasing hydration. 4lb weight gain thus far.   Vitamin D deficient noted on NOB labs, is taking Vit D 5000 units daily.     Normal to feel fetal movement between 18-22 weeks   Denies loss of fluid/vb/contractions/pelvic pain  Declined AFP today  Anatomy ultrasound next visit between 18-22 weeks  Plan for pap postpartum   Plan for Renuka () to see at next appointment, Care Coordination Referral placed. Discussed with patient and  will be longer visit with ultrasound, CNM visit, and SW visit.   Return to clinic 4 weeks    INA Claudio, KENIA

## 2019-10-21 ENCOUNTER — PRENATAL OFFICE VISIT (OUTPATIENT)
Dept: MIDWIFE SERVICES | Facility: CLINIC | Age: 24
End: 2019-10-21
Payer: COMMERCIAL

## 2019-10-21 VITALS — SYSTOLIC BLOOD PRESSURE: 112 MMHG | WEIGHT: 143 LBS | BODY MASS INDEX: 27.04 KG/M2 | DIASTOLIC BLOOD PRESSURE: 68 MMHG

## 2019-10-21 DIAGNOSIS — Z3A.16 16 WEEKS GESTATION OF PREGNANCY: ICD-10-CM

## 2019-10-21 DIAGNOSIS — Z75.8 LANGUAGE BARRIER: ICD-10-CM

## 2019-10-21 DIAGNOSIS — Z78.9 NEED FOR COMMUNITY RESOURCE: ICD-10-CM

## 2019-10-21 DIAGNOSIS — Z60.3 LANGUAGE BARRIER: ICD-10-CM

## 2019-10-21 DIAGNOSIS — Z23 NEED FOR PROPHYLACTIC VACCINATION AND INOCULATION AGAINST INFLUENZA: ICD-10-CM

## 2019-10-21 DIAGNOSIS — O21.9 NAUSEA AND VOMITING DURING PREGNANCY: ICD-10-CM

## 2019-10-21 DIAGNOSIS — O09.92 SUPERVISION OF HIGH RISK PREGNANCY IN SECOND TRIMESTER: Primary | ICD-10-CM

## 2019-10-21 PROCEDURE — 90471 IMMUNIZATION ADMIN: CPT | Performed by: ADVANCED PRACTICE MIDWIFE

## 2019-10-21 PROCEDURE — 90686 IIV4 VACC NO PRSV 0.5 ML IM: CPT | Performed by: ADVANCED PRACTICE MIDWIFE

## 2019-10-21 PROCEDURE — 99207 ZZC PRENATAL VISIT: CPT | Performed by: ADVANCED PRACTICE MIDWIFE

## 2019-10-21 RX ORDER — PYRIDOXINE HCL (VITAMIN B6) 25 MG
25 TABLET ORAL 3 TIMES DAILY
Qty: 30 TABLET | Refills: 3 | Status: SHIPPED | OUTPATIENT
Start: 2019-10-21 | End: 2020-01-13

## 2019-10-21 SDOH — SOCIAL STABILITY - SOCIAL INSECURITY: ACCULTURATION DIFFICULTY: Z60.3

## 2019-10-22 ENCOUNTER — PATIENT OUTREACH (OUTPATIENT)
Dept: CARE COORDINATION | Facility: CLINIC | Age: 24
End: 2019-10-22

## 2019-10-22 NOTE — PROGRESS NOTES
Clinic Care Coordination Contact  [facility]/Voicemail    Referral Source: Care Team  Clinical Data: Care Coordinator Outreach    Outreach attempted x 1.  Left message on patient's voicemail with call back information and requested return call.    Plan: Care Coordinator will meet with pt in clinic as this plan was discussed at 10/21 appointment with Midwife.    LOKI Long, [facility]  Clinic Care Coordinator  [facility]  [phone]  [email or web address]

## 2019-11-08 ENCOUNTER — PRENATAL OFFICE VISIT (OUTPATIENT)
Dept: MIDWIFE SERVICES | Facility: CLINIC | Age: 24
End: 2019-11-08
Payer: COMMERCIAL

## 2019-11-08 ENCOUNTER — TELEPHONE (OUTPATIENT)
Dept: MIDWIFE SERVICES | Facility: CLINIC | Age: 24
End: 2019-11-08

## 2019-11-08 VITALS
HEIGHT: 61 IN | HEART RATE: 72 BPM | SYSTOLIC BLOOD PRESSURE: 98 MMHG | BODY MASS INDEX: 27.75 KG/M2 | DIASTOLIC BLOOD PRESSURE: 64 MMHG | WEIGHT: 147 LBS

## 2019-11-08 DIAGNOSIS — B96.89 BV (BACTERIAL VAGINOSIS): ICD-10-CM

## 2019-11-08 DIAGNOSIS — M54.50 LOW BACK PAIN DURING PREGNANCY IN SECOND TRIMESTER: Primary | ICD-10-CM

## 2019-11-08 DIAGNOSIS — O09.92 SUPERVISION OF HIGH RISK PREGNANCY IN SECOND TRIMESTER: ICD-10-CM

## 2019-11-08 DIAGNOSIS — N76.0 BV (BACTERIAL VAGINOSIS): ICD-10-CM

## 2019-11-08 DIAGNOSIS — O26.892 LOW BACK PAIN DURING PREGNANCY IN SECOND TRIMESTER: Primary | ICD-10-CM

## 2019-11-08 DIAGNOSIS — N89.8 VAGINAL DISCHARGE: ICD-10-CM

## 2019-11-08 LAB
ALBUMIN UR-MCNC: NEGATIVE MG/DL
APPEARANCE UR: CLEAR
BACTERIA #/AREA URNS HPF: ABNORMAL /HPF
BILIRUB UR QL STRIP: NEGATIVE
COLOR UR AUTO: YELLOW
GLUCOSE UR STRIP-MCNC: NEGATIVE MG/DL
GRAM STN SPEC: ABNORMAL
HGB UR QL STRIP: NEGATIVE
KETONES UR STRIP-MCNC: NEGATIVE MG/DL
LEUKOCYTE ESTERASE UR QL STRIP: NEGATIVE
Lab: ABNORMAL
NITRATE UR QL: NEGATIVE
NON-SQ EPI CELLS #/AREA URNS LPF: ABNORMAL /LPF
PH UR STRIP: 7 PH (ref 5–7)
RBC #/AREA URNS AUTO: ABNORMAL /HPF
SOURCE: ABNORMAL
SP GR UR STRIP: 1.02 (ref 1–1.03)
SPECIMEN SOURCE: ABNORMAL
SPECIMEN SOURCE: ABNORMAL
UROBILINOGEN UR STRIP-ACNC: 0.2 EU/DL (ref 0.2–1)
WBC #/AREA URNS AUTO: ABNORMAL /HPF
WET PREP SPEC: ABNORMAL

## 2019-11-08 PROCEDURE — 87210 SMEAR WET MOUNT SALINE/INK: CPT | Performed by: ADVANCED PRACTICE MIDWIFE

## 2019-11-08 PROCEDURE — 87102 FUNGUS ISOLATION CULTURE: CPT | Performed by: ADVANCED PRACTICE MIDWIFE

## 2019-11-08 PROCEDURE — 87653 STREP B DNA AMP PROBE: CPT | Performed by: ADVANCED PRACTICE MIDWIFE

## 2019-11-08 PROCEDURE — 87086 URINE CULTURE/COLONY COUNT: CPT | Performed by: ADVANCED PRACTICE MIDWIFE

## 2019-11-08 PROCEDURE — 81001 URINALYSIS AUTO W/SCOPE: CPT | Performed by: ADVANCED PRACTICE MIDWIFE

## 2019-11-08 PROCEDURE — 87106 FUNGI IDENTIFICATION YEAST: CPT | Performed by: ADVANCED PRACTICE MIDWIFE

## 2019-11-08 PROCEDURE — 87205 SMEAR GRAM STAIN: CPT | Performed by: ADVANCED PRACTICE MIDWIFE

## 2019-11-08 PROCEDURE — 99213 OFFICE O/P EST LOW 20 MIN: CPT | Performed by: ADVANCED PRACTICE MIDWIFE

## 2019-11-08 RX ORDER — PNV NO.95/FERROUS FUM/FOLIC AC 28MG-0.8MG
1 TABLET ORAL DAILY
Qty: 90 TABLET | Refills: 3 | Status: SHIPPED | OUTPATIENT
Start: 2019-11-08 | End: 2020-01-13

## 2019-11-08 RX ORDER — METRONIDAZOLE 500 MG/1
500 TABLET ORAL 2 TIMES DAILY
Qty: 14 TABLET | Refills: 0 | Status: SHIPPED | OUTPATIENT
Start: 2019-11-08 | End: 2020-03-24

## 2019-11-08 ASSESSMENT — MIFFLIN-ST. JEOR: SCORE: 1348.85

## 2019-11-08 NOTE — TELEPHONE ENCOUNTER
19w1d  Pt's sister calling as  for patient:  Pt started with Back pain and abdominal pain at the umbilicus 3 days ago. Keeping her up at night. Also experiencing vaginal discharge with odor.  Next apt 11/20/19. Recommended pt be evaluated in the office today.  Transferred to scheduling. - scheduled at 1300 today    Iris Silveira RN on 11/8/2019 at 9:06 AM

## 2019-11-08 NOTE — RESULT ENCOUNTER NOTE
Reviewed wet prep results in person with . Rx for flagyl. If any other results come back positive, we will call her. Otherwise she understands she will not be called for negative results (no news is good news).

## 2019-11-09 LAB
BACTERIA SPEC CULT: NO GROWTH
GP B STREP DNA SPEC QL NAA+PROBE: NEGATIVE
Lab: NORMAL
SPECIMEN SOURCE: NORMAL
SPECIMEN SOURCE: NORMAL

## 2019-11-11 ENCOUNTER — TELEPHONE (OUTPATIENT)
Dept: MIDWIFE SERVICES | Facility: CLINIC | Age: 24
End: 2019-11-11

## 2019-11-11 LAB
Lab: ABNORMAL
SPECIMEN SOURCE: ABNORMAL
YEAST SPEC QL CULT: ABNORMAL

## 2019-11-19 NOTE — PATIENT INSTRUCTIONS
Round Ligament Pain    Pregnancy can entail many normal discomforts. One of those discomforts may be round ligament pain. Round ligament pain occurs as your uterus and your baby grow and the muscles begin to stretch more in your abdomen. Round ligament pain is normal and not dangerous but can be very uncomfortable      Round ligament pain is typically described as an unpleasant sensation that ranges from a sharp knifelike pain to dull intermittent pain in the lower abdominal/suprapubic area of a pregnant woman      Virtually all pregnant women will experience this pain at some point during their pregnancies      It typically manifests between 16 and 20 weeks of pregnancy and can be incredibly bothersome especially for women who remain very active during their pregnancies       Please discuss with your midwife if you are having this type of pain; sometimes the pain can be associated with other medical conditions so it is important for us to assess you just to make sure    Comfort measures    There are certain things you can do to cope with round ligament pain and ease the discomfort you are having      Pregnancy support belt      Tylenol      Hot/ice packs      Baths       Exercise such as yoga and swimming that help stretch muscles      Prenatal massage      Reflexology to waist and pelvic joints       Positioning such as side-lying and hands and knees, make sure your abdomen is  well supported with pillows while doing different positions      Calcium Rich Foods    All premenopausal or women of child-bearing age need to get at least 1000 mg of calcium per day from diet and/or supplementation. Post menopausal women should get at least 1200 mg from diet and/or supplementation.    Food     Amount   Calcium (mg)  Dairy  Yogurt     1 cup   400   Ice Cream/Frozen yogurt 1/2 cup  100  Milk 1% or 2%   1 cup   300  Cheese   1 oz    195-335   Cottage cheese 2%  1 cup   155  Fruits  Orange   1 medium  60  Pear    1  medium  19  Raisins    1/4 cup  18  Vegetables-fresh and/or cooked  Broccoli   1/2 cup  36  Bok Minh   1/2 cup  79  Mami greens   1/2 cup  15  Carrots    1 medium  19  Iceberg lettuce  4 leaves  16  Nuts, Beans, Seeds  Canned baked beans   1/2 cup  100  Canned red kidney beans 1/2 cup  25-80  Canned navy beans  1/2 cup  64  Tofu with calcium sulfate  1/2 cup  150  Soybeans   1 cup   175  Soy milk    1 cup   10  Almonds    1/4 cup  74  Protein  [place]   3 oz   181  Tuna, canned   3 oz   10  Turkey breast   3.5 oz   10  Egg (large)   1 egg   27  Peanut Butter   2 tbsp   11  Beef     3 oz   9  Chicken   1 leg   15  Grain  Amaranth (uncooked)  1 cup   298  Corn tortilla    1 medium  42  Rice (cooked)   1/2 cup  20  Waffle (frozen ~7in)  1   179  Bagel    1   23  Calcium fortified foods/drinks  Orange juice   1 cup   300  Cereal + milk   3/4 cup + 1/2 cup 400  Rice milk   1 cup   300  Lactaid milk    1 cup       GESTATIONAL DIABETES SCREENING    All pregnant women are screened at least once for diabetes as part of their prenatal care. A woman has gestational diabetes if she has high blood sugars for the first time during pregnancy.      Diabetes can harm your health and the health of your baby.  But if we find the diabetes early in pregnancy we will watch your health closely and prevent further problems.       We will check for gestational diabetes during your visit between 24-28 weeks visit. Please note you can not do this prior to 24 weeks of gestation.      Plan to spend about an hour at the clinic.  When you check in let us know that you will be having your diabetes screening that day.       We will give you a 50 gram glucose drink that you have 5 minutes to consume.  Exactly one hour later you will have draw blood from your arm to check your blood sugar level.      We will call you to let you know if your results are normal.  If the results are normal no more testing will be needed.  If your results are not  normal we will discuss follow up testing with you.        You may eat prior to the testing but it is not recommended to eat or drink very sweet things such as pop, juice, candy or dessert type foods.  Eat a high protein, low carb meals prior to testing.    If you have any questions please call:    [facility]    [phone]

## 2019-11-19 NOTE — PROGRESS NOTES
Feels well, no concerns. Here with family member and [facility] .   Was seen 11/8 for low back pain and abdominal pain, diagnosed with BV. States she took all of her medication and symptoms have resolved.   Fetal movement: not yet. Discussed anterior placenta and normal to not feeling consistent fetal movement for another couple of week yet.  Denies loss of fluid/vb/contractions  Anatomy ultrasound results discussed- single echogenic focus in RT ventricle; to be reviewed by MD, having a Boy, Placenta:  Anterior.   GCT visit between 24-28 weeks, handout provided, reminded of longer appointment.  will stay to help Chloe set up next appointment and GCT.   Round ligament pain and comfort measures reviewed  Renuka, SW, to see today to help connect with resources if Chloe desires.   Return to clinic 4 weeks      Addendum: Plan for repeat US at our clinic in 4 weeks. If echogenic focus is still present, will refer to M for consult/US. Chloe has declined genetic testing, will offer again at next visit.     INA Claudio, GUMAROM

## 2019-11-20 ENCOUNTER — ANCILLARY PROCEDURE (OUTPATIENT)
Dept: ULTRASOUND IMAGING | Facility: CLINIC | Age: 24
End: 2019-11-20
Payer: COMMERCIAL

## 2019-11-20 ENCOUNTER — PRENATAL OFFICE VISIT (OUTPATIENT)
Dept: MIDWIFE SERVICES | Facility: CLINIC | Age: 24
End: 2019-11-20
Payer: COMMERCIAL

## 2019-11-20 ENCOUNTER — PATIENT OUTREACH (OUTPATIENT)
Dept: CARE COORDINATION | Facility: CLINIC | Age: 24
End: 2019-11-20

## 2019-11-20 VITALS — WEIGHT: 150.2 LBS | SYSTOLIC BLOOD PRESSURE: 102 MMHG | DIASTOLIC BLOOD PRESSURE: 64 MMHG | BODY MASS INDEX: 28.4 KG/M2

## 2019-11-20 DIAGNOSIS — O09.92 SUPERVISION OF HIGH RISK PREGNANCY IN SECOND TRIMESTER: Primary | ICD-10-CM

## 2019-11-20 DIAGNOSIS — Z3A.20 20 WEEKS GESTATION OF PREGNANCY: ICD-10-CM

## 2019-11-20 DIAGNOSIS — O26.892 RH NEGATIVE STATE IN ANTEPARTUM PERIOD, SECOND TRIMESTER: ICD-10-CM

## 2019-11-20 DIAGNOSIS — O35.BXX0 ECHOGENIC FOCUS OF HEART OF FETUS AFFECTING ANTEPARTUM CARE OF MOTHER, SINGLE OR UNSPECIFIED FETUS: ICD-10-CM

## 2019-11-20 DIAGNOSIS — O09.92 SUPERVISION OF HIGH RISK PREGNANCY IN SECOND TRIMESTER: ICD-10-CM

## 2019-11-20 DIAGNOSIS — Z67.91 RH NEGATIVE STATE IN ANTEPARTUM PERIOD, SECOND TRIMESTER: ICD-10-CM

## 2019-11-20 PROCEDURE — 76805 OB US >/= 14 WKS SNGL FETUS: CPT | Performed by: OBSTETRICS & GYNECOLOGY

## 2019-11-20 PROCEDURE — 99207 ZZC PRENATAL VISIT: CPT | Performed by: ADVANCED PRACTICE MIDWIFE

## 2019-11-20 ASSESSMENT — ACTIVITIES OF DAILY LIVING (ADL): DEPENDENT_IADLS:: INDEPENDENT

## 2019-11-20 NOTE — PROGRESS NOTES
Clinic Care Coordination Contact    Clinic Care Coordination Contact  OUTREACH    Referral Information:  Referral Source: Care Team    Primary Diagnosis: Psychosocial    Chief Complaint   Patient presents with     Clinic Care Coordination - Face To Face     Clinical Concerns:  CC ARACELI spoke with pt and her mother with  today in clinic. Pt is 20.6wks pregnant.    Functional Status:  Pt is currently not working. She explained that she left her job because there was a lot of bending.    Living Situation:  Pt is currently living with her family. She stated that she can remain there after baby comes. There is hope that when [place] to the [place] they will get a place together. There are currently no plans for when her  will return to [place] but not before baby is born.    Psychosocial:  Pt requests support in obtaining baby items. CC SW will provide community resources.     Financial/Insurance:   Medical Assistance was discussed. Pt currently has [employer] but she was advised to switch this to a different plan but she doesn't know which plan. She explained that she was unable to complete a lab due to insurance not covering this and she is unable to go to an appointment at a different clinic due to insurance.     Resources and Interventions:  Community Resources: None  Referrals Placed: None     Goals        General    1. Psychosocial (pt-stated)     Notes - Note created  11/20/2019 11:30 AM by Vikki Stout [facility]    Goal Statement: Before due date in April, I will utilize community resources to gather needed baby furniture and supplies for my family's overall wellbeing.    Measure of Success: Chloe will verbally inform Care Coordination  of success.    Supportive Steps to Achieve: CC SW will provide community resources to help patient to obtain baby supplies    Barriers: Accessibility of resources based on location in the city, availability of needed items    Strengths: Chloe is  very motivated to provide for her baby    Date to Achieve By: 4/1/2020    Patient expressed understanding of goal: Chloe verbally stated understanding of goal        Patient/Caregiver understanding: Pt verbally stated understanding       Future Appointments              In 3 weeks WE LAB [facility]    In 3 weeks Delisa Butt APRN BRU[facility]      Plan: CC SW will follow up in 1 month per pt request. Pt was reminded of CC SW contact information and encouraged to call with questions or concerns that arise before next outreach.    LOKI Long, [facility]  Clinic Care Coordinator  [facility]  [facility]  [phone]  [email or web address]

## 2019-11-22 ENCOUNTER — TELEPHONE (OUTPATIENT)
Dept: MIDWIFE SERVICES | Facility: CLINIC | Age: 24
End: 2019-11-22

## 2019-12-04 DIAGNOSIS — Z29.13 NEED FOR RHOGAM DUE TO RH NEGATIVE MOTHER: ICD-10-CM

## 2019-12-04 DIAGNOSIS — Z36.9 ENCOUNTER FOR ANTENATAL SCREENING OF MOTHER: Primary | ICD-10-CM

## 2019-12-09 NOTE — PROGRESS NOTES
Feels well, no concerns.   Fetal movement: positive   Denies loss of fluid/vb/contractions  GCT done, passed.   Water birth discussed, patient is not interested  Need for Rhogam? Yes, to be done next visit; explained necessity for Rhogam and blood typing, will also need postpartum unless baby is negative  MFM referral placed due to persistent echogenic focus and no genetic testing, patient has been declining testing, open to either now but counseled on differences and would prefer US follow up to blood draw  Return to clinic 4 weeks     present for entire visit    Tdap needs to be discussed**    INA Logan, CNM

## 2019-12-16 ENCOUNTER — PRENATAL OFFICE VISIT (OUTPATIENT)
Dept: MIDWIFE SERVICES | Facility: CLINIC | Age: 24
End: 2019-12-16
Payer: COMMERCIAL

## 2019-12-16 ENCOUNTER — TRANSCRIBE ORDERS (OUTPATIENT)
Dept: MATERNAL FETAL MEDICINE | Facility: CLINIC | Age: 24
End: 2019-12-16

## 2019-12-16 ENCOUNTER — TRANSFERRED RECORDS (OUTPATIENT)
Dept: HEALTH INFORMATION MANAGEMENT | Facility: CLINIC | Age: 24
End: 2019-12-16

## 2019-12-16 ENCOUNTER — PRE VISIT (OUTPATIENT)
Dept: MATERNAL FETAL MEDICINE | Facility: CLINIC | Age: 24
End: 2019-12-16

## 2019-12-16 VITALS — SYSTOLIC BLOOD PRESSURE: 108 MMHG | BODY MASS INDEX: 29.12 KG/M2 | DIASTOLIC BLOOD PRESSURE: 62 MMHG | WEIGHT: 154 LBS

## 2019-12-16 DIAGNOSIS — O09.92 SUPERVISION OF HIGH RISK PREGNANCY IN SECOND TRIMESTER: ICD-10-CM

## 2019-12-16 DIAGNOSIS — Z3A.24 24 WEEKS GESTATION OF PREGNANCY: ICD-10-CM

## 2019-12-16 DIAGNOSIS — Z36.9 ENCOUNTER FOR ANTENATAL SCREENING OF MOTHER: ICD-10-CM

## 2019-12-16 DIAGNOSIS — O09.92 SUPERVISION OF HIGH RISK PREGNANCY IN SECOND TRIMESTER: Primary | ICD-10-CM

## 2019-12-16 DIAGNOSIS — Z67.91 RH NEGATIVE STATE IN ANTEPARTUM PERIOD: ICD-10-CM

## 2019-12-16 DIAGNOSIS — O26.90 PREGNANCY RELATED CONDITION, ANTEPARTUM: Primary | ICD-10-CM

## 2019-12-16 DIAGNOSIS — Z29.13 NEED FOR RHOGAM DUE TO RH NEGATIVE MOTHER: ICD-10-CM

## 2019-12-16 DIAGNOSIS — G35 MULTIPLE SCLEROSIS (H): ICD-10-CM

## 2019-12-16 DIAGNOSIS — O26.899 RH NEGATIVE STATE IN ANTEPARTUM PERIOD: ICD-10-CM

## 2019-12-16 LAB
BLD GP AB SCN SERPL QL: NORMAL
ERYTHROCYTE [DISTWIDTH] IN BLOOD BY AUTOMATED COUNT: 17 % (ref 10–15)
GLUCOSE 1H P 50 G GLC PO SERPL-MCNC: 103 MG/DL (ref 60–129)
HCT VFR BLD AUTO: 29.1 % (ref 35–47)
HGB BLD-MCNC: 9.2 G/DL (ref 11.7–15.7)
MCH RBC QN AUTO: 26.2 PG (ref 26.5–33)
MCHC RBC AUTO-ENTMCNC: 31.6 G/DL (ref 31.5–36.5)
MCV RBC AUTO: 83 FL (ref 78–100)
PLATELET # BLD AUTO: 169 10E9/L (ref 150–450)
RBC # BLD AUTO: 3.51 10E12/L (ref 3.8–5.2)
WBC # BLD AUTO: 8.3 10E9/L (ref 4–11)

## 2019-12-16 PROCEDURE — 82950 GLUCOSE TEST: CPT | Performed by: ADVANCED PRACTICE MIDWIFE

## 2019-12-16 PROCEDURE — 85027 COMPLETE CBC AUTOMATED: CPT | Performed by: ADVANCED PRACTICE MIDWIFE

## 2019-12-16 PROCEDURE — 36415 COLL VENOUS BLD VENIPUNCTURE: CPT | Performed by: ADVANCED PRACTICE MIDWIFE

## 2019-12-16 PROCEDURE — 99207 ZZC PRENATAL VISIT: CPT | Performed by: ADVANCED PRACTICE MIDWIFE

## 2019-12-16 PROCEDURE — 86850 RBC ANTIBODY SCREEN: CPT | Performed by: ADVANCED PRACTICE MIDWIFE

## 2019-12-16 RX ORDER — FERROUS GLUCONATE 324(38)MG
324 TABLET ORAL
Qty: 60 TABLET | Refills: 1 | Status: SHIPPED | OUTPATIENT
Start: 2019-12-16 | End: 2020-01-27

## 2019-12-16 NOTE — RESULT ENCOUNTER NOTE
Please call patient with  and inform her of anemia. Will send rx to pharmacy for iron. We will plan to recheck her levels in 6 weeks, if they are not much improved we will recommend IV iron.    INA Logan, GUMAROM

## 2019-12-17 ENCOUNTER — OFFICE VISIT (OUTPATIENT)
Dept: MATERNAL FETAL MEDICINE | Facility: CLINIC | Age: 24
End: 2019-12-17
Attending: ADVANCED PRACTICE MIDWIFE
Payer: COMMERCIAL

## 2019-12-17 ENCOUNTER — TELEPHONE (OUTPATIENT)
Dept: MIDWIFE SERVICES | Facility: CLINIC | Age: 24
End: 2019-12-17

## 2019-12-17 ENCOUNTER — HOSPITAL ENCOUNTER (OUTPATIENT)
Dept: ULTRASOUND IMAGING | Facility: CLINIC | Age: 24
Discharge: HOME | End: 2019-12-17
Attending: ADVANCED PRACTICE MIDWIFE | Admitting: ADVANCED PRACTICE MIDWIFE
Payer: COMMERCIAL

## 2019-12-17 DIAGNOSIS — O26.90 PREGNANCY RELATED CONDITION, ANTEPARTUM: ICD-10-CM

## 2019-12-17 DIAGNOSIS — O35.BXX0 ECHOGENIC FOCUS OF HEART OF FETUS AFFECTING ANTEPARTUM CARE OF MOTHER, NOT APPLICABLE OR UNSPECIFIED FETUS: Primary | ICD-10-CM

## 2019-12-17 PROCEDURE — 76811 OB US DETAILED SNGL FETUS: CPT

## 2019-12-17 NOTE — PROGRESS NOTES
"The patient has had no follow up from Neurology for the differences seen on her MRI brain back in 2016.  She denies multiple sclerosis.  She tells me what she remembers is that she was diagnosed with migraines.  Consider further investigation/obtaining records.  Please let our [facility] office know if you would like a consultation on this patient.     The patient tells met that Please see \"Imaging\" tab under \"Chart Review\" for details of today's US.    Carolina Garces, DO    "

## 2019-12-17 NOTE — NURSING NOTE
D: Kurt Loera  from [facility] ID [id number] used for today's ultrasound and visit with Dr. Garces.  Fabby Laura RN

## 2019-12-19 DIAGNOSIS — K59.00 CONSTIPATION DURING PREGNANCY IN FIRST TRIMESTER: ICD-10-CM

## 2019-12-19 DIAGNOSIS — O09.91 SUPERVISION OF HIGH RISK PREGNANCY IN FIRST TRIMESTER: ICD-10-CM

## 2019-12-19 DIAGNOSIS — O99.611 CONSTIPATION DURING PREGNANCY IN FIRST TRIMESTER: ICD-10-CM

## 2019-12-19 RX ORDER — DOCUSATE SODIUM 100 MG/1
100 CAPSULE, LIQUID FILLED ORAL 2 TIMES DAILY PRN
Qty: 120 CAPSULE | Refills: 0 | Status: ON HOLD | OUTPATIENT
Start: 2019-12-19 | End: 2020-04-03

## 2019-12-19 NOTE — TELEPHONE ENCOUNTER
Prescription approved per [facility] Refill Protocol.  Iris Silveira RN on 12/19/2019 at 4:49 PM

## 2019-12-19 NOTE — TELEPHONE ENCOUNTER
"Requested Prescriptions   Pending Prescriptions Disp Refills     docusate sodium (COLACE) 100 MG capsule 60 capsule 0     Sig: Take 1 capsule (100 mg) by mouth 2 times daily as needed for constipation       Laxatives Protocol Passed - 12/19/2019  4:34 PM        Passed - Patient is age 6 or older        Passed - Recent (12 mo) or future (30 days) visit within the authorizing provider's specialty     Patient has had an office visit with the authorizing provider or a provider within the authorizing providers department within the previous 12 mos or has a future within next 30 days. See \"Patient Info\" tab in inbasket, or \"Choose Columns\" in Meds & Orders section of the refill encounter.              Passed - Medication is active on med list        Last Written Prescription Date:  9/25/19  Last Fill Quantity: 60,  # refills: 0   Last office visit: 12/16/2019 with prescribing provider:  Delisa Butt   Future Office Visit:   Next 5 appointments (look out 90 days)    Jan 13, 2020 10:00 AM CST  ESTABLISHED PRENATAL with INA Mcpherson CNM  [facility] [place] ([facility]) [address] [id number] [phone]           "

## 2020-01-03 ENCOUNTER — PATIENT OUTREACH (OUTPATIENT)
Dept: CARE COORDINATION | Facility: CLINIC | Age: 25
End: 2020-01-03

## 2020-01-03 ASSESSMENT — ACTIVITIES OF DAILY LIVING (ADL): DEPENDENT_IADLS:: INDEPENDENT

## 2020-01-03 NOTE — PROGRESS NOTES
Clinic Care Coordination Contact    Follow Up Progress Note      Assessment: CC ARACELI spoke with pt regarding her overall wellbeing. Pt stated that she was doing well. CC SW attempted to discuss topics and goals from previous meeting but pt stated that she was doing well and couldn't identify anything she needed support with at this time. CC SW encouraged pt to outreach in the future if any needs arise. Pt was agreeable to this.    Plan: No further outreaches will be made at this time unless a new referral is made or a change in the pt's status occurs. Patient was provided with CC ARACELI contact information and encouraged to call with any questions or concerns.    LOKI Long, [facility]  Clinic Care Coordinator  [facility]  [phone]  [email or web address]

## 2020-01-10 ENCOUNTER — TELEPHONE (OUTPATIENT)
Dept: OBGYN | Facility: CLINIC | Age: 25
End: 2020-01-10

## 2020-01-10 NOTE — PROGRESS NOTES
"Feels \"good\".  Here with mother and [place] .  Fetal movement: positive, denies loss of fluid/vb/contractions  GCT, CBC done at last visit  Tdap given: Yes  Rhogam: Yes  Anti Treponema drawn: No, declines  Reviewed PTL precautions and S&S of [facility], patient verbalizes understanding and what to report  Hospital Registration reminder-online  Return to clinic 2 weeks    Marjorie BUCKLEY CNM     present during entire visit                            "

## 2020-01-10 NOTE — PATIENT INSTRUCTIONS
"PREECLAMPSIA SIGNS AND SYMPTOMS    Preeclampsia is a dangerous condition that some women develop in the second half of pregnancy. It can also begin after the baby is born.  Preeclampsia causes high blood pressure and can cause problems with many organ systems in your body.  It can also affect the growth of your baby. The exact cause of preeclampsia is unknown, however, there are signs and symptoms to watch for:    -A bad headache that doesn't improve with Tylenol  -Visual changes such as spots, flashes of light, blurry vision  -Pain in the upper right part of your abdomen, especially under the ribs that doesn't go away  -Nausea and/or vomiting  -Feeling extremely tired  -Yellowing of the skin and/or eyes  -Feeling \"not quite right\" or that something is wrong  -An extreme amount of swelling (some swelling in pregnancy is very normal)    If your midwife feels that you are developing preeclampsia, you will have lab tests drawn and will be monitored very closely.     If you are experiencing anyof these symptoms, call the [facility] immediately at [phone].  SIGNS OF  LABOR    Labor is  if it happens more than three weeks before your due date.    It can be hard to know if you are in labor, since the symptoms can be like the normal feelings of pregnancy.  Often, the only difference is the symptoms increase or they don't go away.     Signs of  labor can include:      Contractions which can feel like period cramps or gas pain.  You may feel it in the lower part of your abdomen, in your back, or as a pressure feeling in your bottom.  It is often regular, coming every 5 or 10 minutes, and  lasting about 30-60 seconds. Some contractions are normal during pregnancy (Pepito cox contractions) but if you are feeling more than 5-6 in one hour that is NOT normal    If this occurs empty your bladder, then drink 2-3 glasses of water, eat a snack, and lay down on your left side. Put your hand " on your abdomen to count the contractions.  If after one hour of resting you have still had 5-6 contractions call your clinic right away.      If you feel a pop, gush, or trickle of fluid it may mean that your bag of water has broken and you should contact the clinic       You may also experience loose stools and/or rectal pressure       Listen to your body, if something doesn't seem right please call us at the clinic    Risk Factors      Previous  delivery    Bacterial Vaginosis- if you notice a fishy smell to your discharge or experience vaginal itching/discomfort you should be evaluated for infection    Smoking    Drug abuse    Adolescent (teen) pregnancy or advanced maternal age (AMA) age 35 and over    Dehydration (this may not cause  labor but it can cause contractions)    If you think you are in  labor we may do some lab testing in the clinic or send you to the hospital for evaluation    Please call us if you are concerned you are in  labor.    [facility]  [phone]    Round Ligament Pain    Pregnancy can entail many normal discomforts. One of those discomforts may be round ligament pain. Round ligament pain occurs as your uterus and your baby grow and the muscles begin to stretch more in your abdomen. Round ligament pain is normal and not dangerous but can be very uncomfortable      Round ligament pain is typically described as an unpleasant sensation that ranges from a sharp knifelike pain to dull intermittent pain in the lower abdominal/suprapubic area of a pregnant woman      Virtually all pregnant women will experience this pain at some point during their pregnancies      It typically manifests between 16 and 20 weeks of pregnancy and can be incredibly bothersome especially for women who remain very active during their pregnancies       Please discuss with your midwife if you are having this type of pain; sometimes the pain can be associated with other  medical conditions so it is important for us to assess you just to make sure    Comfort measures    There are certain things you can do to cope with round ligament pain and ease the discomfort you are having      Pregnancy support belt      Tylenol      Hot/ice packs      Baths       Exercise such as yoga and swimming that help stretch muscles      Prenatal massage      Reflexology to waist and pelvic joints       Positioning such as side-lying and hands and knees, make sure your abdomen is  well supported with pillows while doing different positions

## 2020-01-10 NOTE — TELEPHONE ENCOUNTER
Type of Paperwork received:  bus exception form     Date Rcvd:  1/10/20    Rcvd From (Company name): [facility]    Provider:  Midwives    Placed on Provider Cart Date:  1/10/20

## 2020-01-13 ENCOUNTER — PRENATAL OFFICE VISIT (OUTPATIENT)
Dept: MIDWIFE SERVICES | Facility: CLINIC | Age: 25
End: 2020-01-13
Payer: COMMERCIAL

## 2020-01-13 VITALS — DIASTOLIC BLOOD PRESSURE: 60 MMHG | BODY MASS INDEX: 30.63 KG/M2 | WEIGHT: 162 LBS | SYSTOLIC BLOOD PRESSURE: 104 MMHG

## 2020-01-13 DIAGNOSIS — Z3A.28 28 WEEKS GESTATION OF PREGNANCY: ICD-10-CM

## 2020-01-13 DIAGNOSIS — Z23 NEED FOR VACCINATION: ICD-10-CM

## 2020-01-13 DIAGNOSIS — Z67.91 RH NEGATIVE STATE IN ANTEPARTUM PERIOD: Primary | ICD-10-CM

## 2020-01-13 DIAGNOSIS — Z23 NEED FOR TDAP VACCINATION: ICD-10-CM

## 2020-01-13 DIAGNOSIS — O26.899 RH NEGATIVE STATE IN ANTEPARTUM PERIOD: Primary | ICD-10-CM

## 2020-01-13 DIAGNOSIS — O09.93 SUPERVISION OF HIGH RISK PREGNANCY IN THIRD TRIMESTER: ICD-10-CM

## 2020-01-13 DIAGNOSIS — Z29.13 NEED FOR RHOGAM DUE TO RH NEGATIVE MOTHER: ICD-10-CM

## 2020-01-13 PROCEDURE — 90471 IMMUNIZATION ADMIN: CPT | Performed by: NURSE PRACTITIONER

## 2020-01-13 PROCEDURE — 90715 TDAP VACCINE 7 YRS/> IM: CPT | Performed by: NURSE PRACTITIONER

## 2020-01-13 PROCEDURE — 96372 THER/PROPH/DIAG INJ SC/IM: CPT | Performed by: ADVANCED PRACTICE MIDWIFE

## 2020-01-13 PROCEDURE — 99207 ZZC PRENATAL VISIT: CPT | Performed by: NURSE PRACTITIONER

## 2020-01-13 NOTE — NURSING NOTE
Prior to immunization administration, verified patients identity using patient s name and date of birth. Please see Immunization Activity for additional information.     Screening Questionnaire for Adult Immunization    Are you sick today?   No   Do you have allergies to medications, food, a vaccine component or latex?   No   Have you ever had a serious reaction after receiving a vaccination?   No   Do you have a long-term health problem with heart, lung, kidney, or metabolic disease (e.g., diabetes), asthma, a blood disorder, no spleen, complement component deficiency, a cochlear implant, or a spinal fluid leak?  Are you on long-term aspirin therapy?   No   Do you have cancer, leukemia, HIV/AIDS, or any other immune system problem?   No   Do you have a parent, brother, or sister with an immune system problem?   No   In the past 3 months, have you taken medications that affect  your immune system, such as prednisone, other steroids, or anticancer drugs; drugs for the treatment of rheumatoid arthritis, Crohn s disease, or psoriasis; or have you had radiation treatments?   No   Have you had a seizure, or a brain or other nervous system problem?   No   During the past year, have you received a transfusion of blood or blood    products, or been given immune (gamma) globulin or antiviral drug?   No   For women: Are you pregnant or is there a chance you could become       pregnant during the next month?   Yes   Have you received any vaccinations in the past 4 weeks?   No     Immunization questionnaire answers were all negative.        Per orders of Midwife Marjorie , injection of Tdap  given by Bria Ibarra MA. Patient instructed to remain in clinic for 15 minutes afterwards, and to report any adverse reaction to me immediately.       Screening performed by Bria Ibarra MA on 1/13/2020 at 10:10 AM.

## 2020-01-13 NOTE — TELEPHONE ENCOUNTER
Faxed to [facility] and scanned to [facility] [place] 1/13/2020

## 2020-01-27 ENCOUNTER — PRENATAL OFFICE VISIT (OUTPATIENT)
Dept: MIDWIFE SERVICES | Facility: CLINIC | Age: 25
End: 2020-01-27
Payer: COMMERCIAL

## 2020-01-27 VITALS — WEIGHT: 165 LBS | DIASTOLIC BLOOD PRESSURE: 68 MMHG | SYSTOLIC BLOOD PRESSURE: 102 MMHG | BODY MASS INDEX: 31.2 KG/M2

## 2020-01-27 DIAGNOSIS — O09.92 SUPERVISION OF HIGH RISK PREGNANCY IN SECOND TRIMESTER: ICD-10-CM

## 2020-01-27 DIAGNOSIS — Z3A.30 30 WEEKS GESTATION OF PREGNANCY: ICD-10-CM

## 2020-01-27 DIAGNOSIS — O26.899 RH NEGATIVE STATE IN ANTEPARTUM PERIOD: ICD-10-CM

## 2020-01-27 DIAGNOSIS — O99.013 ANEMIA AFFECTING PREGNANCY IN THIRD TRIMESTER: ICD-10-CM

## 2020-01-27 DIAGNOSIS — O09.93 SUPERVISION OF HIGH RISK PREGNANCY IN THIRD TRIMESTER: Primary | ICD-10-CM

## 2020-01-27 DIAGNOSIS — R12 HEARTBURN IN PREGNANCY IN THIRD TRIMESTER: ICD-10-CM

## 2020-01-27 DIAGNOSIS — Z67.91 RH NEGATIVE STATE IN ANTEPARTUM PERIOD: ICD-10-CM

## 2020-01-27 DIAGNOSIS — O26.893 HEARTBURN IN PREGNANCY IN THIRD TRIMESTER: ICD-10-CM

## 2020-01-27 LAB
BASOPHILS # BLD AUTO: 0 10E9/L (ref 0–0.2)
BASOPHILS NFR BLD AUTO: 0.3 %
DIFFERENTIAL METHOD BLD: ABNORMAL
EOSINOPHIL # BLD AUTO: 0.1 10E9/L (ref 0–0.7)
EOSINOPHIL NFR BLD AUTO: 1.1 %
ERYTHROCYTE [DISTWIDTH] IN BLOOD BY AUTOMATED COUNT: 16.4 % (ref 10–15)
HCT VFR BLD AUTO: 34.8 % (ref 35–47)
HGB BLD-MCNC: 11.2 G/DL (ref 11.7–15.7)
LYMPHOCYTES # BLD AUTO: 1.1 10E9/L (ref 0.8–5.3)
LYMPHOCYTES NFR BLD AUTO: 14.7 %
MCH RBC QN AUTO: 28.5 PG (ref 26.5–33)
MCHC RBC AUTO-ENTMCNC: 32.2 G/DL (ref 31.5–36.5)
MCV RBC AUTO: 89 FL (ref 78–100)
MONOCYTES # BLD AUTO: 0.5 10E9/L (ref 0–1.3)
MONOCYTES NFR BLD AUTO: 6.9 %
NEUTROPHILS # BLD AUTO: 5.5 10E9/L (ref 1.6–8.3)
NEUTROPHILS NFR BLD AUTO: 77 %
PLATELET # BLD AUTO: 150 10E9/L (ref 150–450)
RBC # BLD AUTO: 3.93 10E12/L (ref 3.8–5.2)
WBC # BLD AUTO: 7.2 10E9/L (ref 4–11)

## 2020-01-27 PROCEDURE — 85025 COMPLETE CBC W/AUTO DIFF WBC: CPT | Performed by: ADVANCED PRACTICE MIDWIFE

## 2020-01-27 PROCEDURE — 83540 ASSAY OF IRON: CPT | Performed by: ADVANCED PRACTICE MIDWIFE

## 2020-01-27 PROCEDURE — 36415 COLL VENOUS BLD VENIPUNCTURE: CPT | Performed by: ADVANCED PRACTICE MIDWIFE

## 2020-01-27 PROCEDURE — 82728 ASSAY OF FERRITIN: CPT | Performed by: ADVANCED PRACTICE MIDWIFE

## 2020-01-27 PROCEDURE — 99207 ZZC PRENATAL VISIT: CPT | Performed by: ADVANCED PRACTICE MIDWIFE

## 2020-01-27 PROCEDURE — 83550 IRON BINDING TEST: CPT | Performed by: ADVANCED PRACTICE MIDWIFE

## 2020-01-27 RX ORDER — FERROUS GLUCONATE 324(38)MG
324 TABLET ORAL
Qty: 60 TABLET | Refills: 1 | Status: SHIPPED | OUTPATIENT
Start: 2020-01-27 | End: 2020-02-10

## 2020-01-27 NOTE — PATIENT INSTRUCTIONS
SIGNS OF  LABOR    Labor is  if it happens more than three weeks before your due date.    It can be hard to know if you are in labor, since the symptoms can be like the normal feelings of pregnancy.  Often, the only difference is the symptoms increase or they don't go away.     Signs of  labor can include:      Contractions which can feel like period cramps or gas pain.  You may feel it in the lower part of your abdomen, in your back, or as a pressure feeling in your bottom.  It is often regular, coming every 5 or 10 minutes, and  lasting about 30-60 seconds. Some contractions are normal during pregnancy ([place] cox contractions) but if you are feeling more than 5-6 in one hour that is NOT normal    If this occurs empty your bladder, then drink 2-3 glasses of water, eat a snack, and lay down on your left side. Put your hand on your abdomen to count the contractions.  If after one hour of resting you have still had 5-6 contractions call your clinic right away.      If you feel a pop, gush, or trickle of fluid it may mean that your bag of water has broken and you should contact the clinic       You may also experience loose stools and/or rectal pressure       Listen to your body, if something doesn't seem right please call us at the clinic    Risk Factors      Previous  delivery    Bacterial Vaginosis- if you notice a fishy smell to your discharge or experience vaginal itching/discomfort you should be evaluated for infection    Smoking    Drug abuse    Adolescent (teen) pregnancy or advanced maternal age (AMA) age 35 and over    Dehydration (this may not cause  labor but it can cause contractions)    If you think you are in  labor we may do some lab testing in the clinic or send you to the hospital for evaluation    Please call us if you are concerned you are in  labor.    [facility]  [phone]    PREECLAMPSIA SIGNS AND SYMPTOMS    Preeclampsia is a  "dangerous condition that some women develop in the second half of pregnancy. It can also begin after the baby is born.  Preeclampsia causes high blood pressure and can cause problems with many organ systems in your body.  It can also affect the growth of your baby. The exact cause of preeclampsia is unknown, however, there are signs and symptoms to watch for:    -A bad headache that doesn't improve with Tylenol  -Visual changes such as spots, flashes of light, blurry vision  -Pain in the upper right part of your abdomen, especially under the ribs that doesn't go away  -Nausea and/or vomiting  -Feeling extremely tired  -Yellowing of the skin and/or eyes  -Feeling \"not quite right\" or that something is wrong  -An extreme amount of swelling (some swelling in pregnancy is very normal)    If your midwife feels that you are developing preeclampsia, you will have lab tests drawn and will be monitored very closely.     If you are experiencing anyof these symptoms, call the [facility] immediately at [phone].    Fetal Kick Counts    It is important to know when your baby's movements occur. We often get busy with work and life and do not pay close attention to their movements.        Women typically begin feeling movement between 18-22 weeks of gestation, sometimes it can be earlier or later depending on where your placenta is       Movements usually begin feeling like popping or fluttering and as the baby grows they become more pronounced    Toward the end of pregnancy as the baby gets larger they may not move as much or make as big of movements. Babies have maturing sleep cycles as well as not as much room to move and flip. If you are ever concerned about your baby's movements or have not felt the baby move for a while, we recommend you do a fetal kick count. Prior to starting your count drink a glass of water or juice and eat a snack. Then lay down on your side and begin to count movements.     How to do " a Fetal Kick Counts    There are many different ways to monitor your baby's movements. Movements can range from large jabs to small kicks, or wiggles.  Hiccups count!      Count 10 movements in 2 hours when resting and focusing    Count 10 movements in 12 hours when doing normal activity    We recommend that if movements occur but seem decreased that you should be seen in the clinic or hospital for evaluation within 12 hours. If fetal movement is absent or fetal kick counts are low please contact us right away.    If you ever have any concerns about your baby's movements DO NOT HESITATE to call us, we are here for you!    [facility]  [phone]

## 2020-01-27 NOTE — PROGRESS NOTES
Feels okay. Here alone today.  did not show up so Bria (MA) interpreted.   Fetal Movement: positive, denies loss of fluid/vb, no contractions.  Was taking iron supplements for anemia. Ran out 2 weeks ago. We will draw labs today. Discussed continuing supplementation and switching to Venofer infusions if needed. New rx sent.  She is having heartburn consistently. She has not taken anything. Rx for Zantac and instruction for use discussed.   Fetal kick counts reviewed and handout given  Reviewed PTL precautions and s/sx of preeclampsia; denies any S&S and aware of what to report    Return to clinic in 2 weeks    Tamie Mariee, DNP, APRN, CNM

## 2020-01-28 LAB
FERRITIN SERPL-MCNC: 6 NG/ML (ref 12–150)
IRON SATN MFR SERPL: 9 % (ref 15–46)
IRON SERPL-MCNC: 45 UG/DL (ref 35–180)
TIBC SERPL-MCNC: 476 UG/DL (ref 240–430)

## 2020-01-28 NOTE — RESULT ENCOUNTER NOTE
Pt was called with these results yesterday. Hgb normal at this time, does not need to continue oral iron supplementation. Any further details documented in the note.     INA Claudio, GUMAROM

## 2020-02-10 ENCOUNTER — PRENATAL OFFICE VISIT (OUTPATIENT)
Dept: MIDWIFE SERVICES | Facility: CLINIC | Age: 25
End: 2020-02-10
Payer: COMMERCIAL

## 2020-02-10 VITALS — WEIGHT: 164 LBS | SYSTOLIC BLOOD PRESSURE: 106 MMHG | BODY MASS INDEX: 31.01 KG/M2 | DIASTOLIC BLOOD PRESSURE: 64 MMHG

## 2020-02-10 DIAGNOSIS — O09.93 SUPERVISION OF HIGH RISK PREGNANCY IN THIRD TRIMESTER: Primary | ICD-10-CM

## 2020-02-10 DIAGNOSIS — Z3A.32 32 WEEKS GESTATION OF PREGNANCY: ICD-10-CM

## 2020-02-10 DIAGNOSIS — O26.899 RH NEGATIVE STATE IN ANTEPARTUM PERIOD: ICD-10-CM

## 2020-02-10 DIAGNOSIS — O99.013 ANEMIA AFFECTING PREGNANCY IN THIRD TRIMESTER: ICD-10-CM

## 2020-02-10 DIAGNOSIS — Z67.91 RH NEGATIVE STATE IN ANTEPARTUM PERIOD: ICD-10-CM

## 2020-02-10 PROCEDURE — 99207 ZZC PRENATAL VISIT: CPT | Performed by: ADVANCED PRACTICE MIDWIFE

## 2020-02-10 RX ORDER — FERROUS GLUCONATE 324(38)MG
324 TABLET ORAL
Qty: 60 TABLET | Refills: 1 | Status: SHIPPED | OUTPATIENT
Start: 2020-02-10 | End: 2020-03-10

## 2020-02-10 NOTE — PROGRESS NOTES
Feels well, was told to stop iron with normal hemoglobin, has no current symptoms but we reviewed importance of continuing due to how lown initial check was, some of iron labs were still abnormal despite normal hemoglobin.   Reviewed iron rich foods, may try taking every other day, she still has some at home. Reports some constipation with iron supplements, encouraged taking colace, increasing activity and hydration  Fetal Movement: positive, denies loss of fluid/vb, no contractions  Fetal kick counts/movement reviewed  Reviewed PTL precautions and s/sx of preeclampsia; denies any S&S and aware of what to report  Peds chosen: No, discussed pediatricians [place] [facility] or [facility], patient to check with insurance and call for coverage about circumcision   Pediatrician: circumcision; advised to check insurance for hospital vs clinic  Plans to breastfeed Yes  Return to clinic 2 weeks     present for entire visit    INA Logan, GUMAROM

## 2020-02-19 NOTE — PROGRESS NOTES
Feels well overall.   Still having some heartburn. Reviewed OTC medications on purple handout with  and Chloe.  Taking oral iron daily. Having constipation, taking Colace which is only helping a little bit. Discussed increasing water intake, thinks she maybe drinks 6 glasses of water a day. Discussed also increasing fiber intake through diet or OTC supplement.  Fetal Movement: positive, denies loss of fluid/vb, no  contractions  Fetal kick counts/movement reviewed    Reviewed PTL precautions and s/sx of preeclampsia; denies any S&S and aware of what to report     Depression screening done: yes, has no concerns about mood  Baby Box given:  Handout given  Taking hospital tour?  No  Have car seat: No, plans to get one soon.   Discussed GBS/cx check at next visit  Return to clinic 2 weeks     present for entire visit     INA Claudio, CNM

## 2020-02-19 NOTE — PATIENT INSTRUCTIONS
GROUP B STREP    Group B Strep (GBS) is a common bacteria that is sometimes found in the vagina, urinary tract or rectum.  It is not harmful typically to adults but can cause serious illness in newborns.  It occasionally is passed from mother to baby during birth.   It is important to test in pregnancy.  When a woman is found to be positive for GBS, either at the first prenatal visit or by taking a culture at 36 weeks, treatment will be offered to reduce the chance of spreading the bacteria to the baby.       Treatment consists of either oral antibiotics early in pregnancy or antibiotics given by IV during labor if testing is positive at 36 weeks.      Even without treatment the baby rarely (1-2% of the time) gets infected.  With treatment the baby almost never gets infected.       There really isn't anything you can do to keep from getting or being positive for GBS.  It isn't sexually transmitted and there are no symptoms if you are positive.       Your midwife will discuss your results with you and make recommendations for treatment.                                                     Infant Car Seat Safety   [place] law requires that all infants must be secured into a car seat while in a car.    General Car Seat Guidelines:            Your car seat should be no more than six years old and should never have been in an accident.  Always know the history of your car seat.            Car seats should be installed in the back seat and should be rear-facing. The American Academy of Pediatrics recommends that all infants and toddlers should remain rear-facing for as long as possible, until they reach the weight or height limits allowed by their car seat. Follow your car seat 's instructions.            The straps on the car seat should be snug enough that you should not be able to pinch up slack with your fingers.            Never dress your baby in thick clothing, coats or blankets while riding in a car  seat.  Secure the baby into the seat wearing clothing of normal thickness and then cover baby s lap with a blanket OVER the car seat straps, if needed.  Car Seat Clinics and Safety Information:            Schedule an appointment to have a professional check the installation of your car seat before your baby is born.    Go to: https://[place]ps.[place].gov/divisions/ots/child-passenger-safety/Pages/car-seat-checks.aspx         and click on your county.            Sign up for email alerts on child restraint/car seat recalls at:     http://www-darvin.nhtsa.[facility].gov/subscriptions/index.cfm             Find more information on car seat safety at:     http://www.safercar.gov/parents/CarSeats/Car-Seat-Safety.htm  SIGNS OF  LABOR    Labor is  if it happens more than three weeks before your due date.    It can be hard to know if you are in labor, since the symptoms can be like the normal feelings of pregnancy.  Often, the only difference is the symptoms increase or they don't go away.     Signs of  labor can include:      Contractions which can feel like period cramps or gas pain.  You may feel it in the lower part of your abdomen, in your back, or as a pressure feeling in your bottom.  It is often regular, coming every 5 or 10 minutes, and  lasting about 30-60 seconds. Some contractions are normal during pregnancy ([place] cox contractions) but if you are feeling more than 5-6 in one hour that is NOT normal    If this occurs empty your bladder, then drink 2-3 glasses of water, eat a snack, and lay down on your left side. Put your hand on your abdomen to count the contractions.  If after one hour of resting you have still had 5-6 contractions call your clinic right away.      If you feel a pop, gush, or trickle of fluid it may mean that your bag of water has broken and you should contact the clinic       You may also experience loose stools and/or rectal pressure       Listen to your body, if something doesn't seem  right please call us at the clinic    Risk Factors      Previous  delivery    Bacterial Vaginosis- if you notice a fishy smell to your discharge or experience vaginal itching/discomfort you should be evaluated for infection    Smoking    Drug abuse    Adolescent (teen) pregnancy or advanced maternal age (AMA) age 35 and over    Dehydration (this may not cause  labor but it can cause contractions)    If you think you are in  labor we may do some lab testing in the clinic or send you to the hospital for evaluation    Please call us if you are concerned you are in  labor.    [facility]  [phone]    Fetal Kick Counts    It is important to know when your baby's movements occur. We often get busy with work and life and do not pay close attention to their movements.        Women typically begin feeling movement between 18-22 weeks of gestation, sometimes it can be earlier or later depending on where your placenta is       Movements usually begin feeling like popping or fluttering and as the baby grows they become more pronounced    Toward the end of pregnancy as the baby gets larger they may not move as much or make as big of movements. Babies have maturing sleep cycles as well as not as much room to move and flip. If you are ever concerned about your baby's movements or have not felt the baby move for a while, we recommend you do a fetal kick count. Prior to starting your count drink a glass of water or juice and eat a snack. Then lay down on your side and begin to count movements.     How to do a Fetal Kick Counts    There are many different ways to monitor your baby's movements. Movements can range from large jabs to small kicks, or wiggles.  Hiccups count!      Count 10 movements in 2 hours when resting and focusing    Count 10 movements in 12 hours when doing normal activity    We recommend that if movements occur but seem decreased that you should be seen in the clinic  "or hospital for evaluation within 12 hours. If fetal movement is absent or fetal kick counts are low please contact us right away.    If you ever have any concerns about your baby's movements DO NOT HESITATE to call us, we are here for you!    [facility]  [phone]      PREECLAMPSIA SIGNS AND SYMPTOMS    Preeclampsia is a dangerous condition that some women develop in the second half of pregnancy. It can also begin after the baby is born.  Preeclampsia causes high blood pressure and can cause problems with many organ systems in your body.  It can also affect the growth of your baby. The exact cause of preeclampsia is unknown, however, there are signs and symptoms to watch for:    -A bad headache that doesn't improve with Tylenol  -Visual changes such as spots, flashes of light, blurry vision  -Pain in the upper right part of your abdomen, especially under the ribs that doesn't go away  -Nausea and/or vomiting  -Feeling extremely tired  -Yellowing of the skin and/or eyes  -Feeling \"not quite right\" or that something is wrong  -An extreme amount of swelling (some swelling in pregnancy is very normal)    If your midwife feels that you are developing preeclampsia, you will have lab tests drawn and will be monitored very closely.     If you are experiencing anyof these symptoms, call the [facility] immediately at [phone].    "

## 2020-02-24 ENCOUNTER — PRENATAL OFFICE VISIT (OUTPATIENT)
Dept: MIDWIFE SERVICES | Facility: CLINIC | Age: 25
End: 2020-02-24
Payer: COMMERCIAL

## 2020-02-24 VITALS — WEIGHT: 169 LBS | DIASTOLIC BLOOD PRESSURE: 66 MMHG | BODY MASS INDEX: 31.95 KG/M2 | SYSTOLIC BLOOD PRESSURE: 106 MMHG

## 2020-02-24 DIAGNOSIS — Z3A.34 34 WEEKS GESTATION OF PREGNANCY: ICD-10-CM

## 2020-02-24 DIAGNOSIS — O09.93 SUPERVISION OF HIGH RISK PREGNANCY IN THIRD TRIMESTER: Primary | ICD-10-CM

## 2020-02-24 DIAGNOSIS — Z67.91 RH NEGATIVE STATE IN ANTEPARTUM PERIOD: ICD-10-CM

## 2020-02-24 DIAGNOSIS — O26.899 RH NEGATIVE STATE IN ANTEPARTUM PERIOD: ICD-10-CM

## 2020-02-24 PROCEDURE — 99207 ZZC PRENATAL VISIT: CPT | Performed by: ADVANCED PRACTICE MIDWIFE

## 2020-03-09 NOTE — PROGRESS NOTES
Feels well.   Taking iron daily but states it is making her constipated. Is using Metamucil daily, has Colace but not taking. Feels she's drinking adequate amount of water. Recommended adding in Colace up to BID prn. Refill for iron sent to pharmacy.   Fetal movement: positive  Denies bleeding/lof, no contractions  GBS swab done today  Confirmed vertex and vertex via leopolds and bedside US  Cx:  deferred   Labor instructions given  Labor preferences/birth plan reviewed: Chloe states she hasn't thought about birth preferences/plan much, has not thought about what she wanted to do for pain management. Briefly discussed options, she states she prefers minimal intervention  Breast pump Rx yes, given today.     Warning signs reviewed  Patient denies S&S of pre-eclampsia and aware of what to report   Return to clinic 1 week     present for entire visit. Will help Chloe schedule next appointment and bring to medical supply store to  breat pump.     INA Claudio, CNM

## 2020-03-09 NOTE — PATIENT INSTRUCTIONS
"Labor Instructions for Midwife Patients    When to call:  Both during and after office hours call  [phone]. There is a triage RN to take your calls and answer your questions 24 hours a day.  If she cannot answer your question she will page the midwife on call for you.    When to call:  Call anytime you have important concerns about you or your baby.     Call if:    You are having contractions at regular intervals about 5-6 minutes apart lasting 30-60 seconds and becoming increasingly more intense     You have an uncontrollable gush of fluid from your vagina or feel a pop and gush like your water has broken    You have HEAVY bleeding, like heavy period, blood running down your legs, or  soaking a pad.     Some bleeding after a pelvic exam, after intercourse, or in labor when your cervix is dilating is normal and is referred to as \"bloody show\"    You have severe, continuous back or abdominal pain    You feel it is time to go to the hospital    If this is your first labor, call when contractions are very intense and have been about every 3-4 minutes for about an hour    If it is your second labor or more, call when contractions are strong and about every 3-5 minutes or sooner depending on your level of discomfort.     Keep in mind we are always here for you! If you have questions, concerns please don't hesitate to call us.     What to eat/drink in labor: Drink plenty of fluid (water most importantly, juice, soda or tea without caffeine). Eat rice, pasta, soup, cereal, bread/toast, and fruit. Avoid dairy and greasy food as they are difficult to digest and you may experience some nausea during labor.    Comfort measures:    Baths and showers (ok even with ruptured membranes, it may temporarily slow contractions if you are still in the early stage of labor)    Warm/hot packs for back pain or discomfort    Back, belly, or thigh massages    Standing, rocking, walking, leaning over bed or tables, side-lying and " "sleeping    Miscellaneous:     Contractions are timed from the beginning of one to the beginning of the next    Try hard to sleep during the early stage of labor when you are not that uncomfortable. Timing of contractions at this point is not important    Even if you cannot sleep, resting in bed or on the couch can help you maintain your energy for labor    When you arrive at the hospital the nurse will check your baby's heartbeat, check your cervix, and will call us. The midwife on call will come in and be with you when you are in active labor    After hours you need to enter the hospital through the emergency room      PREECLAMPSIA SIGNS AND SYMPTOMS    Preeclampsia is a dangerous condition that some women develop in the second half of pregnancy. It can also begin after the baby is born.  Preeclampsia causes high blood pressure and can cause problems with many organ systems in your body.  It can also affect the growth of your baby. The exact cause of preeclampsia is unknown, however, there are signs and symptoms to watch for:    -A bad headache that doesn't improve with Tylenol  -Visual changes such as spots, flashes of light, blurry vision  -Pain in the upper right part of your abdomen, especially under the ribs that doesn't go away  -Nausea and/or vomiting  -Feeling extremely tired  -Yellowing of the skin and/or eyes  -Feeling \"not quite right\" or that something is wrong  -An extreme amount of swelling (some swelling in pregnancy is very normal)    If your midwife feels that you are developing preeclampsia, you will have lab tests drawn and will be monitored very closely.     If you are experiencing anyof these symptoms, call the [facility] immediately at [phone].    SIGNS OF  LABOR    Labor is  if it happens more than three weeks before your due date.    It can be hard to know if you are in labor, since the symptoms can be like the normal feelings of pregnancy.  Often, the only " difference is the symptoms increase or they don't go away.     Signs of  labor can include:      Contractions which can feel like period cramps or gas pain.  You may feel it in the lower part of your abdomen, in your back, or as a pressure feeling in your bottom.  It is often regular, coming every 5 or 10 minutes, and  lasting about 30-60 seconds. Some contractions are normal during pregnancy ([place] cox contractions) but if you are feeling more than 5-6 in one hour that is NOT normal    If this occurs empty your bladder, then drink 2-3 glasses of water, eat a snack, and lay down on your left side. Put your hand on your abdomen to count the contractions.  If after one hour of resting you have still had 5-6 contractions call your clinic right away.      If you feel a pop, gush, or trickle of fluid it may mean that your bag of water has broken and you should contact the clinic       You may also experience loose stools and/or rectal pressure       Listen to your body, if something doesn't seem right please call us at the clinic    Risk Factors      Previous  delivery    Bacterial Vaginosis- if you notice a fishy smell to your discharge or experience vaginal itching/discomfort you should be evaluated for infection    Smoking    Drug abuse    Adolescent (teen) pregnancy or advanced maternal age (AMA) age 35 and over    Dehydration (this may not cause  labor but it can cause contractions)    If you think you are in  labor we may do some lab testing in the clinic or send you to the hospital for evaluation    Please call us if you are concerned you are in  labor.    [facility]  [phone]

## 2020-03-10 ENCOUNTER — PRENATAL OFFICE VISIT (OUTPATIENT)
Dept: MIDWIFE SERVICES | Facility: CLINIC | Age: 25
End: 2020-03-10
Payer: COMMERCIAL

## 2020-03-10 VITALS — DIASTOLIC BLOOD PRESSURE: 60 MMHG | BODY MASS INDEX: 31.76 KG/M2 | SYSTOLIC BLOOD PRESSURE: 100 MMHG | WEIGHT: 168 LBS

## 2020-03-10 DIAGNOSIS — O09.93 SUPERVISION OF HIGH RISK PREGNANCY IN THIRD TRIMESTER: Primary | ICD-10-CM

## 2020-03-10 DIAGNOSIS — O26.899 RH NEGATIVE STATE IN ANTEPARTUM PERIOD: ICD-10-CM

## 2020-03-10 DIAGNOSIS — Z67.91 RH NEGATIVE STATE IN ANTEPARTUM PERIOD: ICD-10-CM

## 2020-03-10 DIAGNOSIS — Z36.85 ANTENATAL SCREENING FOR STREPTOCOCCUS B: ICD-10-CM

## 2020-03-10 DIAGNOSIS — Z3A.36 36 WEEKS GESTATION OF PREGNANCY: ICD-10-CM

## 2020-03-10 DIAGNOSIS — O99.013 ANEMIA AFFECTING PREGNANCY IN THIRD TRIMESTER: ICD-10-CM

## 2020-03-10 PROCEDURE — 87653 STREP B DNA AMP PROBE: CPT | Performed by: ADVANCED PRACTICE MIDWIFE

## 2020-03-10 PROCEDURE — 99207 ZZC PRENATAL VISIT: CPT | Performed by: ADVANCED PRACTICE MIDWIFE

## 2020-03-10 RX ORDER — FERROUS GLUCONATE 324(38)MG
324 TABLET ORAL
Qty: 60 TABLET | Refills: 1 | Status: ON HOLD | OUTPATIENT
Start: 2020-03-10 | End: 2020-04-03

## 2020-03-11 LAB
GP B STREP DNA SPEC QL NAA+PROBE: NEGATIVE
SPECIMEN SOURCE: NORMAL

## 2020-03-17 ENCOUNTER — PRENATAL OFFICE VISIT (OUTPATIENT)
Dept: MIDWIFE SERVICES | Facility: CLINIC | Age: 25
End: 2020-03-17
Payer: COMMERCIAL

## 2020-03-17 VITALS — WEIGHT: 168.4 LBS | SYSTOLIC BLOOD PRESSURE: 112 MMHG | BODY MASS INDEX: 31.84 KG/M2 | DIASTOLIC BLOOD PRESSURE: 64 MMHG

## 2020-03-17 DIAGNOSIS — O26.899 RH NEGATIVE STATE IN ANTEPARTUM PERIOD: Primary | ICD-10-CM

## 2020-03-17 DIAGNOSIS — Z67.91 RH NEGATIVE STATE IN ANTEPARTUM PERIOD: Primary | ICD-10-CM

## 2020-03-17 DIAGNOSIS — O09.93 SUPERVISION OF HIGH RISK PREGNANCY IN THIRD TRIMESTER: ICD-10-CM

## 2020-03-17 DIAGNOSIS — Z3A.37 37 WEEKS GESTATION OF PREGNANCY: ICD-10-CM

## 2020-03-17 PROCEDURE — 99207 ZZC PRENATAL VISIT: CPT | Performed by: ADVANCED PRACTICE MIDWIFE

## 2020-03-17 NOTE — PROGRESS NOTES
Feels well, no concerns.   Fetal movement: positive  Denies vaginal bleeding/lof, no contractions  Desires SVE: FT/40-50%/-3/mid-posterior/medium consistency  Warning signs reviewed  Labor signs and symptoms discussed, aware of numbers to call  Denies s/sx of preeclampsia and aware of what to report  Plans for birth control after baby: unsure. Briefly reviewed options and recommended pregnancy spacing. Will discuss more postpartum   present for entire visit  Return to clinic 1 week    INA Claudio, CNM

## 2020-03-17 NOTE — PATIENT INSTRUCTIONS
"NATURAL LABOR PREPARATION    So, you're getting closer and closer to your due date and wondering what you can do to help get your body ready for labor.   Here are some natural methods you can try:    NOTE: DO NOT begin any of the following prior to 36 completed weeks of pregnancy!    Evening Primrose Oil: Take 1000mg by mouth three times per day. This encourages the cervix to ripen. Cervical ripening is when your cervix becomes more soft and stretchy to get ready for dilation and effacement.     Red Raspberry Leaf Tea: Red raspberry tea (get it at a Co-op or in the grocery store). Drink three cups per day (hot or cold). This can help to prepare the uterine muscles for labor.    Labor Instructions for Midwife Patients    When to call:  Both during and after office hours call  [phone]. There is a triage RN to take your calls and answer your questions 24 hours a day.  If she cannot answer your question she will page the midwife on call for you.    When to call:  Call anytime you have important concerns about you or your baby.     Call if:    You are having contractions at regular intervals about 5-6 minutes apart lasting 30-60 seconds and becoming increasingly more intense     You have an uncontrollable gush of fluid from your vagina or feel a pop and gush like your water has broken    You have HEAVY bleeding, like heavy period, blood running down your legs, or  soaking a pad.     Some bleeding after a pelvic exam, after intercourse, or in labor when your cervix is dilating is normal and is referred to as \"bloody show\"    You have severe, continuous back or abdominal pain    You feel it is time to go to the hospital    If this is your first labor, call when contractions are very intense and have been about every 3-4 minutes for about an hour    If it is your second labor or more, call when contractions are strong and about every 3-5 minutes or sooner depending on your level of discomfort.     Keep in mind we are " always here for you! If you have questions, concerns please don't hesitate to call us.     What to eat/drink in labor: Drink plenty of fluid (water most importantly, juice, soda or tea without caffeine). Eat rice, pasta, soup, cereal, bread/toast, and fruit. Avoid dairy and greasy food as they are difficult to digest and you may experience some nausea during labor.    Comfort measures:    Baths and showers (ok even with ruptured membranes, it may temporarily slow contractions if you are still in the early stage of labor)    Warm/hot packs for back pain or discomfort    Back, belly, or thigh massages    Standing, rocking, walking, leaning over bed or tables, side-lying and sleeping    Miscellaneous:     Contractions are timed from the beginning of one to the beginning of the next    Try hard to sleep during the early stage of labor when you are not that uncomfortable. Timing of contractions at this point is not important    Even if you cannot sleep, resting in bed or on the couch can help you maintain your energy for labor    When you arrive at the hospital the nurse will check your baby's heartbeat, check your cervix, and will call us. The midwife on call will come in and be with you when you are in active labor    After hours you need to enter the hospital through the emergency room    PREECLAMPSIA SIGNS AND SYMPTOMS    Preeclampsia is a dangerous condition that some women develop in the second half of pregnancy. It can also begin after the baby is born.  Preeclampsia causes high blood pressure and can cause problems with many organ systems in your body.  It can also affect the growth of your baby. The exact cause of preeclampsia is unknown, however, there are signs and symptoms to watch for:    -A bad headache that doesn't improve with Tylenol  -Visual changes such as spots, flashes of light, blurry vision  -Pain in the upper right part of your abdomen, especially under the ribs that doesn't go away  -Nausea  "and/or vomiting  -Feeling extremely tired  -Yellowing of the skin and/or eyes  -Feeling \"not quite right\" or that something is wrong  -An extreme amount of swelling (some swelling in pregnancy is very normal)    If your midwife feels that you are developing preeclampsia, you will have lab tests drawn and will be monitored very closely.     If you are experiencing anyof these symptoms, call the [facility] immediately at [phone].    Fetal Kick Counts    It is important to know when your baby's movements occur. We often get busy with work and life and do not pay close attention to their movements.        Women typically begin feeling movement between 18-22 weeks of gestation, sometimes it can be earlier or later depending on where your placenta is       Movements usually begin feeling like popping or fluttering and as the baby grows they become more pronounced    Toward the end of pregnancy as the baby gets larger they may not move as much or make as big of movements. Babies have maturing sleep cycles as well as not as much room to move and flip. If you are ever concerned about your baby's movements or have not felt the baby move for a while, we recommend you do a fetal kick count. Prior to starting your count drink a glass of water or juice and eat a snack. Then lay down on your side and begin to count movements.     How to do a Fetal Kick Counts    There are many different ways to monitor your baby's movements. Movements can range from large jabs to small kicks, or wiggles.  Hiccups count!      Count 10 movements in 2 hours when resting and focusing    Count 10 movements in 12 hours when doing normal activity    We recommend that if movements occur but seem decreased that you should be seen in the clinic or hospital for evaluation within 12 hours. If fetal movement is absent or fetal kick counts are low please contact us right away.    If you ever have any concerns about your baby's movements DO " NOT HESITATE to call us, we are here for you!    [facility]  [phone]

## 2020-03-22 NOTE — PROGRESS NOTES
Feels well, no concerns.   Out of prenatal vitamins, refill sent.   Fetal movement: positive  Denies vaginal bleeding/lof, no contractions  Warning signs reviewed   Labor signs and symptoms discussed  Denies s/sx of preeclampsia and aware of what to report  Return to clinic 1 week   utilized via phone for entire visit    INA Claudio, CNM

## 2020-03-22 NOTE — PATIENT INSTRUCTIONS
"Labor Instructions for Midwife Patients    When to call:  Both during and after office hours call  [phone]. There is a triage RN to take your calls and answer your questions 24 hours a day.  If she cannot answer your question she will page the midwife on call for you.    When to call:  Call anytime you have important concerns about you or your baby.     Call if:    You are having contractions at regular intervals about 5-6 minutes apart lasting 30-60 seconds and becoming increasingly more intense     You have an uncontrollable gush of fluid from your vagina or feel a pop and gush like your water has broken    You have HEAVY bleeding, like heavy period, blood running down your legs, or  soaking a pad.     Some bleeding after a pelvic exam, after intercourse, or in labor when your cervix is dilating is normal and is referred to as \"bloody show\"    You have severe, continuous back or abdominal pain    You feel it is time to go to the hospital    If this is your first labor, call when contractions are very intense and have been about every 3-4 minutes for about an hour    If it is your second labor or more, call when contractions are strong and about every 3-5 minutes or sooner depending on your level of discomfort.     Keep in mind we are always here for you! If you have questions, concerns please don't hesitate to call us.     What to eat/drink in labor: Drink plenty of fluid (water most importantly, juice, soda or tea without caffeine). Eat rice, pasta, soup, cereal, bread/toast, and fruit. Avoid dairy and greasy food as they are difficult to digest and you may experience some nausea during labor.    Comfort measures:    Baths and showers (ok even with ruptured membranes, it may temporarily slow contractions if you are still in the early stage of labor)    Warm/hot packs for back pain or discomfort    Back, belly, or thigh massages    Standing, rocking, walking, leaning over bed or tables, side-lying and " "sleeping    Miscellaneous:     Contractions are timed from the beginning of one to the beginning of the next    Try hard to sleep during the early stage of labor when you are not that uncomfortable. Timing of contractions at this point is not important    Even if you cannot sleep, resting in bed or on the couch can help you maintain your energy for labor    When you arrive at the hospital the nurse will check your baby's heartbeat, check your cervix, and will call us. The midwife on call will come in and be with you when you are in active labor    After hours you need to enter the hospital through the emergency room    PREECLAMPSIA SIGNS AND SYMPTOMS    Preeclampsia is a dangerous condition that some women develop in the second half of pregnancy. It can also begin after the baby is born.  Preeclampsia causes high blood pressure and can cause problems with many organ systems in your body.  It can also affect the growth of your baby. The exact cause of preeclampsia is unknown, however, there are signs and symptoms to watch for:    -A bad headache that doesn't improve with Tylenol  -Visual changes such as spots, flashes of light, blurry vision  -Pain in the upper right part of your abdomen, especially under the ribs that doesn't go away  -Nausea and/or vomiting  -Feeling extremely tired  -Yellowing of the skin and/or eyes  -Feeling \"not quite right\" or that something is wrong  -An extreme amount of swelling (some swelling in pregnancy is very normal)    If your midwife feels that you are developing preeclampsia, you will have lab tests drawn and will be monitored very closely.     If you are experiencing anyof these symptoms, call the [facility] immediately at [phone].    Fetal Kick Counts    It is important to know when your baby's movements occur. We often get busy with work and life and do not pay close attention to their movements.        Women typically begin feeling movement between 18-22 " weeks of gestation, sometimes it can be earlier or later depending on where your placenta is       Movements usually begin feeling like popping or fluttering and as the baby grows they become more pronounced    Toward the end of pregnancy as the baby gets larger they may not move as much or make as big of movements. Babies have maturing sleep cycles as well as not as much room to move and flip. If you are ever concerned about your baby's movements or have not felt the baby move for a while, we recommend you do a fetal kick count. Prior to starting your count drink a glass of water or juice and eat a snack. Then lay down on your side and begin to count movements.     How to do a Fetal Kick Counts    There are many different ways to monitor your baby's movements. Movements can range from large jabs to small kicks, or wiggles.  Hiccups count!      Count 10 movements in 2 hours when resting and focusing    Count 10 movements in 12 hours when doing normal activity    We recommend that if movements occur but seem decreased that you should be seen in the clinic or hospital for evaluation within 12 hours. If fetal movement is absent or fetal kick counts are low please contact us right away.    If you ever have any concerns about your baby's movements DO NOT HESITATE to call us, we are here for you!    [facility]  [phone]

## 2020-03-23 ENCOUNTER — APPOINTMENT (OUTPATIENT)
Dept: INTERPRETER SERVICES | Facility: CLINIC | Age: 25
End: 2020-03-23
Payer: COMMERCIAL

## 2020-03-24 ENCOUNTER — PRENATAL OFFICE VISIT (OUTPATIENT)
Dept: MIDWIFE SERVICES | Facility: CLINIC | Age: 25
End: 2020-03-24
Payer: COMMERCIAL

## 2020-03-24 VITALS — WEIGHT: 168 LBS | SYSTOLIC BLOOD PRESSURE: 102 MMHG | DIASTOLIC BLOOD PRESSURE: 64 MMHG | BODY MASS INDEX: 31.76 KG/M2

## 2020-03-24 DIAGNOSIS — O26.899 RH NEGATIVE STATE IN ANTEPARTUM PERIOD: ICD-10-CM

## 2020-03-24 DIAGNOSIS — O09.93 SUPERVISION OF HIGH RISK PREGNANCY IN THIRD TRIMESTER: Primary | ICD-10-CM

## 2020-03-24 DIAGNOSIS — O09.91 SUPERVISION OF HIGH RISK PREGNANCY IN FIRST TRIMESTER: ICD-10-CM

## 2020-03-24 DIAGNOSIS — Z3A.38 38 WEEKS GESTATION OF PREGNANCY: ICD-10-CM

## 2020-03-24 DIAGNOSIS — Z67.91 RH NEGATIVE STATE IN ANTEPARTUM PERIOD: ICD-10-CM

## 2020-03-24 PROCEDURE — 99207 ZZC PRENATAL VISIT: CPT | Performed by: ADVANCED PRACTICE MIDWIFE

## 2020-03-24 RX ORDER — PNV NO.95/FERROUS FUM/FOLIC AC 28MG-0.8MG
1 TABLET ORAL DAILY
Qty: 90 TABLET | Refills: 3 | Status: SHIPPED | OUTPATIENT
Start: 2020-03-24 | End: 2024-01-30

## 2020-03-26 ENCOUNTER — TELEPHONE (OUTPATIENT)
Dept: MIDWIFE SERVICES | Facility: CLINIC | Age: 25
End: 2020-03-26

## 2020-03-26 NOTE — TELEPHONE ENCOUNTER
Pt's sister called asking that we email a letter that confirms the pt is pregnant and lists the pt's DOM. Per pt's sister they would like the letter to be emailed to the pt's email address [email or web address]

## 2020-03-26 NOTE — LETTER
March 26, 2020        Chloe Cleaning  [address] [place] [address]          To whom it may concern:    RE: Chloe Corona is a patient of our midwife group and under our care and supervision of her current pregnancy. Her estimated due date is 4/2/2020.    Please contact me for questions or concerns.      Sincerely,        Orquidea Sandoval CNM

## 2020-03-26 NOTE — TELEPHONE ENCOUNTER
Letter written and sent to email address below.    No answer and unable to leave message - mailbox full.    Iris Silveira RN on 3/26/2020 at 10:53 AM

## 2020-03-30 ENCOUNTER — NURSE TRIAGE (OUTPATIENT)
Dept: NURSING | Facility: CLINIC | Age: 25
End: 2020-03-30

## 2020-03-30 ENCOUNTER — TELEPHONE (OUTPATIENT)
Dept: MIDWIFE SERVICES | Facility: CLINIC | Age: 25
End: 2020-03-30

## 2020-03-31 ENCOUNTER — PRENATAL OFFICE VISIT (OUTPATIENT)
Dept: MIDWIFE SERVICES | Facility: CLINIC | Age: 25
End: 2020-03-31
Payer: COMMERCIAL

## 2020-03-31 ENCOUNTER — ALLIED HEALTH/NURSE VISIT (OUTPATIENT)
Dept: NURSING | Facility: CLINIC | Age: 25
End: 2020-03-31
Payer: COMMERCIAL

## 2020-03-31 VITALS — SYSTOLIC BLOOD PRESSURE: 108 MMHG | BODY MASS INDEX: 31.95 KG/M2 | DIASTOLIC BLOOD PRESSURE: 66 MMHG | WEIGHT: 169 LBS

## 2020-03-31 DIAGNOSIS — Z36.89 NST (NON-STRESS TEST) REACTIVE: Primary | ICD-10-CM

## 2020-03-31 DIAGNOSIS — O09.93 SUPERVISION OF HIGH RISK PREGNANCY IN THIRD TRIMESTER: ICD-10-CM

## 2020-03-31 PROCEDURE — 59025 FETAL NON-STRESS TEST: CPT

## 2020-03-31 PROCEDURE — 99207 ZZC PRENATAL VISIT: CPT | Performed by: ADVANCED PRACTICE MIDWIFE

## 2020-03-31 NOTE — TELEPHONE ENCOUNTER
Laura (sister/) calls and says that her sister is over 39 weeks pregnant and is having contractions. Denies any bleeding. Lorena Landrum-[facility] CNM-was paged to call Laura-at: [phone], at: 2027, via smart web. COVID 19 Nurse Triage Plan    Please be aware that novel coronavirus (COVID-19) may be circulating in the community. If you develop symptoms such as fever, cough, or SOB or if you have concerns about the presence of another infection including coronavirus (COVID-19), please contact your health care provider or visit [email or web address].     Patient HAS NO known exposure, fever, cough or SOB in addition to reason for call.    Additional General Information About COVID-19    COVID-19 - General Information:  Regardless of if you have been tested or not:  Patient who have symptoms (cough, fever, or shortness of breath), need to isolate for 7 days from when symptoms started AND 72 hours after fever resolves (without fever reducing medications) AND improvement of respiratory symptoms (whichever is longer).      Isolate yourself at home (in own room/own bathroom if possible)    Do Not allow any visitors    Do Not go to work or school    Do Not go to [employer]ai,  centers, shopping, or other public places.    Do Not shake hands.    Avoid close and intimate contact with others (hugging, kissing).    Follow CDC recommendations for household cleaning of frequently touched services.     After the initial 7 days, continue to isolate yourself from household members as much as possible. To continue decrease the risk of community spread and exposure, you and any members of your household should limit activities in public for 14 days after starting home isolation.     You can reference the following CDC link for helpful home isolation/care tips:  https://www.cdc.gov/coronavirus/2019-ncov/downloads/10Things.pdf    COVID-19 - Symptoms:     The COVID-19 can cause a respiratory illness,  such as bronchitis or pneumonia.    The most common symptoms are: cough, fever, and shortness of breath.     Other symptoms are: body aches, chills, diarrhea, fatigue, headache, runny nose, and sore throat     COVID-19 - Exposure Risk Factors:    Exposure to a person who has been diagnosed with COVID-19 .    Travel from an area with recent local transmission of COVID-19 .    The [facility] (www.cdc.gov) has the most up-to-date list of where the COVID-19 outbreak is occurring.    COVID-19 - Spreading:     The virus likely spreads through respiratory droplets produced when a person coughs or sneezes. These respiratory droplets can travel approximately 6 feet and can remain on surfaces.  Common disinfectants will kill the virus.    The CDC currently does not recommend healthy people wearing masks.    COVID-19 - Protect Yourself:     Avoid close contact with people known to have this new COVID-19 infection.    Wash hands often with soap and water or alcohol-based hand .    Avoid touching the eyes, nose or mouth.       Thank you for limiting contact with others, wearing a simple mask to cover your cough, practice good hand hygiene habits and accessing our virtual services where possible to limit the spread of this virus.    For more information about COVID19 and options for caring for yourself at home, please visit the CDC website at https://www.cdc.gov/coronavirus/2019-ncov/about/steps-when-sick.html  For more options for care at [facility], please visit our website at [email or web address]                    Reason for Disposition    MODERATE-SEVERE abdominal pain    Additional Information    Negative: Passed out (i.e., lost consciousness, collapsed and was not responding)    Negative: Shock suspected (e.g., cold/pale/clammy skin, too weak to stand, low BP, rapid pulse)    Negative: Difficult to awaken or acting confused (e.g., disoriented, slurred speech)    Negative: [1] SEVERE  "abdominal pain (e.g., excruciating) AND [2] constant AND [3] present > 1 hour    Negative: SEVERE vaginal bleeding (e.g., continuous red blood from vagina, or large blood clots)    Negative: Sounds like a life-threatening emergency to the triager    Negative: Followed an abdomen (stomach) injury    [1] Having contractions or other symptoms of labor (such as vaginal pressure) AND [2] >= 37 weeks pregnant (i.e., term pregnancy)    Negative: Passed out (i.e., lost consciousness, collapsed and was not responding)    Negative: Shock suspected (e.g., cold/pale/clammy skin, too weak to stand, low BP, rapid pulse)    Negative: Difficult to awaken or acting confused (e.g., disoriented, slurred speech)    Negative: [1] SEVERE abdominal pain (e.g., excruciating) AND [2] constant AND [3] present > 1 hour    Negative: Severe bleeding (e.g., continuous red blood from vagina, or large blood clots)    Negative: Umbilical cord hanging out of the vagina (shiny, white, curled appearance, \"like telephone cord\")    Negative: Uncontrollable urge to push (i.e., feels like baby is coming out now)    Negative: Can see baby    Negative: Sounds like a life-threatening emergency to the triager    Negative: Pregnant < 37 weeks (i.e., )    Negative: [1] Uncertain delivery date AND [2] possibly pregnant < 37 weeks (i.e., )    Negative: [1] First baby (primipara) AND [2] contractions < 6 minutes apart  AND [3] present 2 hours    Negative: [1] History of prior delivery (multipara) AND [2] contractions < 10 minutes apart AND [3] present 1 hour    Negative: [1] History of rapid prior delivery AND [2] contractions < 10 minutes apart    Negative: [1] Leakage of fluid from vagina AND [2] green or brown in color    Negative: [1] Leakage of fluid from vagina AND [2] leakage started > 4 hours ago    Negative: Vaginal bleeding or spotting    Negative: Baby moving less today (e.g., kick count < 5 in 1 hour or < 10 in 2 hours)    Negative: " Severe headache or headache that won't go away    Negative: New blurred vision or vision changes    Protocols used: PREGNANCY - LABOR-A-AH, PREGNANCY - ABDOMINAL PAIN GREATER THAN 20 WEEKS EGA-A-AH

## 2020-03-31 NOTE — PROGRESS NOTES
"Last night was having ctxs every 20 minutes, today every 15 minutes, lasting 6 minutes. Contractions are felt in the back and none in the abdomen. Denies any vaginal bleeding or leaking of fluid.   Reports no fetal movement since last night. Last night FM was \"a lot.\" Tried to question if she was having no fetal movement or decreased fetal movement and she again states no fetal movement.   Explained fetal kick counts and when to be concerned. Will do an NST here in clinic today for reassurance.      NST INTERPRETATION     Baseline Rate: 130 moderate variability  Accelerations: present  Decelerations:  none      Denies vaginal bleeding/lof, some contractions  SVE: ft/40/-3    Dilation of Cervix:  0        Score = 0  Effacement:  40-50%;   Score = 1  Consistency:  Firm;   Score = 0  Position:  Posterior;   Score = 0  Station:  -3;  Score = 0  Total Sandoval Score:  1    Discussed elective IOL beginning at 39 weeks is an option but not necessary. States would like to talk with her mom first, but as of now she would like to wait until spontaneous labor begins.   Labor signs and symptoms discussed  Denies s/sx of preeclampsia and aware of what to report  Discussed 1 visitor policy for the hospital. Advised only being able to bring one personal bag to the hospital. Enter through door 6 and not the ER entry.   She is planning to bring her mom with when in labor. Suggested considering bring her sister (who speaks English) to help with interpretation. Otherwise we will likely be using a phone or ipad with an .  Encouraged to call with any questions and or if spontaneous labor begins.     Chloe verbalizes an understanding of today's visit and instructions.   Phone  use for entire visit.     Return to clinic 1 week (in clinic)    Orquidea BUCKLEY CNM            "

## 2020-03-31 NOTE — PATIENT INSTRUCTIONS
Post Dates Management    If you've gone beyond your due date you are probably thinking when is this baby coming!  Most babies are born on or after their due date so it is nothing to worry about.    If you are pregnant at 41 weeks we will start some increased monitoring to make sure that your baby and the placenta are healthy enough to continue your pregnancy.      We would have you come to the clinic every 3-4 days to be assessed with an ultrasound called a Biophysical profile (BPP).       This tells us how your baby is doing. We monitor fetal movement, breathing patterns, amniotic fluid level, and heart rate.     Research has shown that by 42 weeks gestation the placenta is not always healthy enough to support the pregnancy further and it is better for the baby to be born.  If you are still pregnant by 41 and a half weeks we will discuss induction of labor with you and the different options available.     Preconception Care    If you are thinking about becoming pregnant in the near future or actively trying to conceive we want to make sure you are as healthy as possible before becoming pregnant. By meeting with your midwife or provider prior to getting pregnant it allows us to address any health needs that can affect pregnancy. Please discuss your personal and family history with your midwife in order for us to identify any risk factors      If you wish to attempt pregnancy stop whichever form of contraception you use. If you have an IUD it can be removed in the office and you can start trying right away. If you take OCPs finish current pack, cycle, and then you can actively start trying      Make sure all the medications you are taking are safe for pregnancy. This is especially important for women with chronic health conditions such as diabetes, seizure disorders, and/or hypertension. If you are unsure if your medications are safe we can discuss them with you, do not abruptly stop taking something if  you've been prescribed a medication by a medical provider      Folic acid 400-800 mcg per day by mouth daily, begin this routine 1 to 12 months prior to planned conception, and continue through at least 13 weeks gestation. Some prenatal/multi-vitamins contain enough folic acid       Be up to date on all your vaccines. Avoid pregnancy for 1 month after administration of varicella/ Rubella (MMR) vaccines      We encourage all women to be at healthy BMI (<26) when attempting pregnancy, it is healthier for both you and your baby. If you are overweight you can make a healthy choice by eating better and exercising more. Waiting to get pregnant at a healthy weight is an excellent choice.                                       Eat a healthy, well-balanced diet containing lots of fresh fruit and vegetables (frozen without added sugar is okay), protein, and healthy carbohydrates such as whole wheat breads and pastas      Exercise regularly. If you are wishing to get pregnant maintain normal exercise routine. If you don't exercise this is a great time for light activity daily such as walking/swimming      Limit caffeine intake to less than 200 mg per day, typically 1-2 cups of regular coffee is about 200 mg.       Avoid cigarettes, alcohol, and recreational drug use while attempting pregnancy. If you are actively trying to conceive but you get your period or a negative pregnancy test it is okay to have alcohol in moderation until you are actively trying again      If you have the potential to toxic chemical exposures in your work place or home please use special precautions    Menstrual Cycles      Knowing your cycle is incredibly important when attempting pregnancy      Your cycle begins day 1 of your period and goes until the 1st day of your next period. Typically women have 28-32 day cycles. If your cycles are shorter or longer it can be a normal variation.       Women typically ovulate in the middle of their  "cycle      There are many great apps that can help you track you cycle monthly to establish when you are ovulating      You may also start taking your temperature daily with a basal body temp thermometer to help identify when you ovulate      There are also ovulation predictor kits available over the counter at the pharmacy      If you want more information please contact your midwife      It can take up to 1 year for a woman under the age of 35 or 6 months for a woman over the age of 35 to conceive. If it takes longer than this we would like to evaluate you for fertility issues.       PREECLAMPSIA SIGNS AND SYMPTOMS    Preeclampsia is a dangerous condition that some women develop in the second half of pregnancy. It can also begin after the baby is born.  Preeclampsia causes high blood pressure and can cause problems with many organ systems in your body.  It can also affect the growth of your baby. The exact cause of preeclampsia is unknown, however, there are signs and symptoms to watch for:    -A bad headache that doesn't improve with Tylenol  -Visual changes such as spots, flashes of light, blurry vision  -Pain in the upper right part of your abdomen, especially under the ribs that doesn't go away  -Nausea and/or vomiting  -Feeling extremely tired  -Yellowing of the skin and/or eyes  -Feeling \"not quite right\" or that something is wrong  -An extreme amount of swelling (some swelling in pregnancy is very normal)    If your midwife feels that you are developing preeclampsia, you will have lab tests drawn and will be monitored very closely.     If you are experiencing anyof these symptoms, call the [facility] immediately at [phone].  "

## 2020-03-31 NOTE — NURSING NOTE
Patient here for NST per order from GAURI Sandoval CNM for pt's complaint of DFM here for routine prenatal visit. States last felt baby movement yesterday afternoon, 3/30 although baby active before that time.  ([email or web address] phone  used for this visit)    External monitors placed after explanation and consent from patient about the procedure.  Denies contractions or cramping, LOF or VB. Does state she is having low back pain since 3/29.  Reports active FM      NST INTERPRETATION    Baseline Rate: 130 moderate variability  Accelerations: present  Decelerations:  none  Reactivity confirmed after strip reviewed by ALONSO Sandoval CNM    Pt discharged home without incident with instructions to call with any concerns or sx's of labor or DFM.  Pt verbalized understanding, in agreement with plan, and voiced no further questions.    Iris Silveira RN on 3/31/2020 at 1:00 PM

## 2020-03-31 NOTE — TELEPHONE ENCOUNTER
"Received page from [facility] to call pt to discuss contractions. Called Chloe back on her sister's cellphone number, her sister is serving as . Chloe states she has been having irregular \"pains\" since 0440 this morning. Prior to calling [facility] she had two contractions that were 10 minutes apart and then another one 15 minutes later. She has not been timing how long they are but states she only has \"pain for a little bit then it goes away.\" She denies leaking of fluid and vaginal bleeding. Reports good fetal movement.     Discussed with Chloe through  that she may be in early labor. Reviewed 5-1-1 rule and when to present to the hospital.  relayed information to Chloe and she states understanding. We discussed that early labor can be lengthy and her contractions may not get closer together for some time yet. Advised to continue to monitor contractions, once contractions are less than 5 min apart, last for 1 minute for at least one hour or if contraction increase in intensity and she can no longer talk through them then it is time time call again and plan to head to the hospital. Reviewed she should also call if concerns for lof, vaginal bleeding or decreased fetal movement. Early labor comfort measures reviewed. Encouraged rest and hydration and to call back at any time.     Chloe states understanding of plan and will continue to monitor her contractions. If she does not go into active labor tonight, knows to come to clinic tomorrow for her appointment.    Chloe's sister Laura served as  for entire phone call.     INA Claudio, GUMAROM    "

## 2020-04-01 ENCOUNTER — ANESTHESIA (OUTPATIENT)
Dept: OBGYN | Facility: CLINIC | Age: 25
End: 2020-04-01
Payer: COMMERCIAL

## 2020-04-01 ENCOUNTER — ANESTHESIA EVENT (OUTPATIENT)
Dept: OBGYN | Facility: CLINIC | Age: 25
End: 2020-04-01
Payer: COMMERCIAL

## 2020-04-01 ENCOUNTER — APPOINTMENT (OUTPATIENT)
Dept: INTERPRETER SERVICES | Facility: CLINIC | Age: 25
End: 2020-04-01
Payer: COMMERCIAL

## 2020-04-01 ENCOUNTER — HOSPITAL ENCOUNTER (INPATIENT)
Facility: CLINIC | Age: 25
LOS: 2 days | Discharge: HOME HEALTH | End: 2020-04-03
Attending: ADVANCED PRACTICE MIDWIFE | Admitting: ADVANCED PRACTICE MIDWIFE
Payer: COMMERCIAL

## 2020-04-01 DIAGNOSIS — H18.893 LIMBAL STEM CELL DEFICIENCY, BILATERAL: ICD-10-CM

## 2020-04-01 DIAGNOSIS — O09.90 SUPERVISION OF HIGH RISK PREGNANCY, ANTEPARTUM: ICD-10-CM

## 2020-04-01 PROBLEM — Z36.89 ENCOUNTER FOR TRIAGE IN PREGNANT PATIENT: Status: ACTIVE | Noted: 2020-04-01

## 2020-04-01 LAB
ABO + RH BLD: NORMAL
ABO + RH BLD: NORMAL
BASOPHILS # BLD AUTO: 0 10E9/L (ref 0–0.2)
BASOPHILS NFR BLD AUTO: 0 %
BLOOD BANK CMNT PATIENT-IMP: NORMAL
DIFFERENTIAL METHOD BLD: NORMAL
EOSINOPHIL # BLD AUTO: 0 10E9/L (ref 0–0.7)
EOSINOPHIL NFR BLD AUTO: 0.5 %
ERYTHROCYTE [DISTWIDTH] IN BLOOD BY AUTOMATED COUNT: 14.6 % (ref 10–15)
HCT VFR BLD AUTO: 39 % (ref 35–47)
HGB BLD-MCNC: 13.2 G/DL (ref 11.7–15.7)
IMM GRANULOCYTES # BLD: 0 10E9/L (ref 0–0.4)
IMM GRANULOCYTES NFR BLD: 0.2 %
LYMPHOCYTES # BLD AUTO: 1.2 10E9/L (ref 0.8–5.3)
LYMPHOCYTES NFR BLD AUTO: 14.2 %
MCH RBC QN AUTO: 29.7 PG (ref 26.5–33)
MCHC RBC AUTO-ENTMCNC: 33.8 G/DL (ref 31.5–36.5)
MCV RBC AUTO: 88 FL (ref 78–100)
MONOCYTES # BLD AUTO: 0.4 10E9/L (ref 0–1.3)
MONOCYTES NFR BLD AUTO: 4.5 %
NEUTROPHILS # BLD AUTO: 6.7 10E9/L (ref 1.6–8.3)
NEUTROPHILS NFR BLD AUTO: 80.6 %
NRBC # BLD AUTO: 0 10*3/UL
NRBC BLD AUTO-RTO: 0 /100
PLATELET # BLD AUTO: 174 10E9/L (ref 150–450)
RBC # BLD AUTO: 4.44 10E12/L (ref 3.8–5.2)
SPECIMEN EXP DATE BLD: NORMAL
WBC # BLD AUTO: 8.4 10E9/L (ref 4–11)

## 2020-04-01 PROCEDURE — 86870 RBC ANTIBODY IDENTIFICATION: CPT | Performed by: OBSTETRICS & GYNECOLOGY

## 2020-04-01 PROCEDURE — 59400 OBSTETRICAL CARE: CPT | Mod: 22 | Performed by: OBSTETRICS & GYNECOLOGY

## 2020-04-01 PROCEDURE — 25000128 H RX IP 250 OP 636: Performed by: ANESTHESIOLOGY

## 2020-04-01 PROCEDURE — G0463 HOSPITAL OUTPT CLINIC VISIT: HCPCS | Mod: 25

## 2020-04-01 PROCEDURE — 86901 BLOOD TYPING SEROLOGIC RH(D): CPT | Performed by: OBSTETRICS & GYNECOLOGY

## 2020-04-01 PROCEDURE — 86850 RBC ANTIBODY SCREEN: CPT | Performed by: OBSTETRICS & GYNECOLOGY

## 2020-04-01 PROCEDURE — 3E0R3BZ INTRODUCTION OF ANESTHETIC AGENT INTO SPINAL CANAL, PERCUTANEOUS APPROACH: ICD-10-PCS | Performed by: ANESTHESIOLOGY

## 2020-04-01 PROCEDURE — 10907ZC DRAINAGE OF AMNIOTIC FLUID, THERAPEUTIC FROM PRODUCTS OF CONCEPTION, VIA NATURAL OR ARTIFICIAL OPENING: ICD-10-PCS | Performed by: OBSTETRICS & GYNECOLOGY

## 2020-04-01 PROCEDURE — 86900 BLOOD TYPING SEROLOGIC ABO: CPT | Performed by: OBSTETRICS & GYNECOLOGY

## 2020-04-01 PROCEDURE — 25000125 ZZHC RX 250: Performed by: ANESTHESIOLOGY

## 2020-04-01 PROCEDURE — 25000132 ZZH RX MED GY IP 250 OP 250 PS 637

## 2020-04-01 PROCEDURE — 00HU33Z INSERTION OF INFUSION DEVICE INTO SPINAL CANAL, PERCUTANEOUS APPROACH: ICD-10-PCS | Performed by: ANESTHESIOLOGY

## 2020-04-01 PROCEDURE — 85025 COMPLETE CBC W/AUTO DIFF WBC: CPT | Performed by: ADVANCED PRACTICE MIDWIFE

## 2020-04-01 PROCEDURE — 25800030 ZZH RX IP 258 OP 636: Performed by: ADVANCED PRACTICE MIDWIFE

## 2020-04-01 PROCEDURE — 86780 TREPONEMA PALLIDUM: CPT | Performed by: ADVANCED PRACTICE MIDWIFE

## 2020-04-01 PROCEDURE — 37000011 ZZH ANESTHESIA WARD SERVICE

## 2020-04-01 PROCEDURE — 25000128 H RX IP 250 OP 636: Performed by: OBSTETRICS & GYNECOLOGY

## 2020-04-01 PROCEDURE — 36415 COLL VENOUS BLD VENIPUNCTURE: CPT | Performed by: ADVANCED PRACTICE MIDWIFE

## 2020-04-01 PROCEDURE — 25000125 ZZHC RX 250: Performed by: ADVANCED PRACTICE MIDWIFE

## 2020-04-01 PROCEDURE — 12000000 ZZH R&B MED SURG/OB

## 2020-04-01 PROCEDURE — 25000128 H RX IP 250 OP 636: Performed by: ADVANCED PRACTICE MIDWIFE

## 2020-04-01 PROCEDURE — 59025 FETAL NON-STRESS TEST: CPT | Mod: 26 | Performed by: ADVANCED PRACTICE MIDWIFE

## 2020-04-01 PROCEDURE — 0DQR0ZZ REPAIR ANAL SPHINCTER, OPEN APPROACH: ICD-10-PCS | Performed by: OBSTETRICS & GYNECOLOGY

## 2020-04-01 PROCEDURE — 59025 FETAL NON-STRESS TEST: CPT

## 2020-04-01 PROCEDURE — 72200001 ZZH LABOR CARE VAGINAL DELIVERY SINGLE

## 2020-04-01 RX ORDER — ONDANSETRON 4 MG/1
4 TABLET, ORALLY DISINTEGRATING ORAL EVERY 6 HOURS PRN
Status: DISCONTINUED | OUTPATIENT
Start: 2020-04-01 | End: 2020-04-03 | Stop reason: HOSPADM

## 2020-04-01 RX ORDER — SODIUM CHLORIDE, SODIUM LACTATE, POTASSIUM CHLORIDE, CALCIUM CHLORIDE 600; 310; 30; 20 MG/100ML; MG/100ML; MG/100ML; MG/100ML
INJECTION, SOLUTION INTRAVENOUS CONTINUOUS
Status: DISCONTINUED | OUTPATIENT
Start: 2020-04-01 | End: 2020-04-03 | Stop reason: HOSPADM

## 2020-04-01 RX ORDER — OXYTOCIN/0.9 % SODIUM CHLORIDE 30/500 ML
340 PLASTIC BAG, INJECTION (ML) INTRAVENOUS CONTINUOUS PRN
Status: DISCONTINUED | OUTPATIENT
Start: 2020-04-01 | End: 2020-04-03 | Stop reason: HOSPADM

## 2020-04-01 RX ORDER — METHYLERGONOVINE MALEATE 0.2 MG/ML
200 INJECTION INTRAVENOUS
Status: DISCONTINUED | OUTPATIENT
Start: 2020-04-01 | End: 2020-04-03 | Stop reason: HOSPADM

## 2020-04-01 RX ORDER — LANOLIN 100 %
OINTMENT (GRAM) TOPICAL
Status: DISCONTINUED | OUTPATIENT
Start: 2020-04-01 | End: 2020-04-03 | Stop reason: HOSPADM

## 2020-04-01 RX ORDER — NALOXONE HYDROCHLORIDE 0.4 MG/ML
.1-.4 INJECTION, SOLUTION INTRAMUSCULAR; INTRAVENOUS; SUBCUTANEOUS
Status: DISCONTINUED | OUTPATIENT
Start: 2020-04-01 | End: 2020-04-03 | Stop reason: HOSPADM

## 2020-04-01 RX ORDER — DEXTROSE, SODIUM CHLORIDE, SODIUM LACTATE, POTASSIUM CHLORIDE, AND CALCIUM CHLORIDE 5; .6; .31; .03; .02 G/100ML; G/100ML; G/100ML; G/100ML; G/100ML
INJECTION, SOLUTION INTRAVENOUS CONTINUOUS
Status: DISCONTINUED | OUTPATIENT
Start: 2020-04-01 | End: 2020-04-03 | Stop reason: HOSPADM

## 2020-04-01 RX ORDER — OXYTOCIN/0.9 % SODIUM CHLORIDE 30/500 ML
1-24 PLASTIC BAG, INJECTION (ML) INTRAVENOUS CONTINUOUS
Status: DISCONTINUED | OUTPATIENT
Start: 2020-04-01 | End: 2020-04-03 | Stop reason: HOSPADM

## 2020-04-01 RX ORDER — ONDANSETRON 2 MG/ML
4 INJECTION INTRAMUSCULAR; INTRAVENOUS EVERY 6 HOURS PRN
Status: DISCONTINUED | OUTPATIENT
Start: 2020-04-01 | End: 2020-04-03 | Stop reason: HOSPADM

## 2020-04-01 RX ORDER — OXYCODONE AND ACETAMINOPHEN 5; 325 MG/1; MG/1
1 TABLET ORAL
Status: DISCONTINUED | OUTPATIENT
Start: 2020-04-01 | End: 2020-04-03 | Stop reason: HOSPADM

## 2020-04-01 RX ORDER — ROPIVACAINE HYDROCHLORIDE 2 MG/ML
10 INJECTION, SOLUTION EPIDURAL; INFILTRATION; PERINEURAL ONCE
Status: COMPLETED | OUTPATIENT
Start: 2020-04-01 | End: 2020-04-01

## 2020-04-01 RX ORDER — DOCUSATE SODIUM 100 MG/1
100 CAPSULE, LIQUID FILLED ORAL 2 TIMES DAILY
Status: DISCONTINUED | OUTPATIENT
Start: 2020-04-02 | End: 2020-04-03 | Stop reason: HOSPADM

## 2020-04-01 RX ORDER — BISACODYL 10 MG
10 SUPPOSITORY, RECTAL RECTAL DAILY PRN
Status: DISCONTINUED | OUTPATIENT
Start: 2020-04-03 | End: 2020-04-03 | Stop reason: HOSPADM

## 2020-04-01 RX ORDER — CARBOPROST TROMETHAMINE 250 UG/ML
250 INJECTION, SOLUTION INTRAMUSCULAR
Status: DISCONTINUED | OUTPATIENT
Start: 2020-04-01 | End: 2020-04-03 | Stop reason: HOSPADM

## 2020-04-01 RX ORDER — IBUPROFEN 400 MG/1
800 TABLET, FILM COATED ORAL EVERY 6 HOURS PRN
Status: DISCONTINUED | OUTPATIENT
Start: 2020-04-01 | End: 2020-04-03 | Stop reason: HOSPADM

## 2020-04-01 RX ORDER — HYDROCODONE BITARTRATE AND ACETAMINOPHEN 5; 325 MG/1; MG/1
1 TABLET ORAL EVERY 4 HOURS PRN
Status: DISCONTINUED | OUTPATIENT
Start: 2020-04-01 | End: 2020-04-03 | Stop reason: HOSPADM

## 2020-04-01 RX ORDER — HYDROCORTISONE 2.5 %
CREAM (GRAM) TOPICAL 3 TIMES DAILY PRN
Status: DISCONTINUED | OUTPATIENT
Start: 2020-04-01 | End: 2020-04-03 | Stop reason: HOSPADM

## 2020-04-01 RX ORDER — IBUPROFEN 400 MG/1
800 TABLET, FILM COATED ORAL
Status: COMPLETED | OUTPATIENT
Start: 2020-04-01 | End: 2020-04-02

## 2020-04-01 RX ORDER — OXYTOCIN 10 [USP'U]/ML
10 INJECTION, SOLUTION INTRAMUSCULAR; INTRAVENOUS
Status: DISCONTINUED | OUTPATIENT
Start: 2020-04-01 | End: 2020-04-03 | Stop reason: HOSPADM

## 2020-04-01 RX ORDER — MISOPROSTOL 200 UG/1
TABLET ORAL
Status: COMPLETED
Start: 2020-04-01 | End: 2020-04-01

## 2020-04-01 RX ORDER — LIDOCAINE 40 MG/G
CREAM TOPICAL
Status: DISCONTINUED | OUTPATIENT
Start: 2020-04-01 | End: 2020-04-03 | Stop reason: HOSPADM

## 2020-04-01 RX ORDER — NALBUPHINE HYDROCHLORIDE 10 MG/ML
2.5-5 INJECTION, SOLUTION INTRAMUSCULAR; INTRAVENOUS; SUBCUTANEOUS EVERY 6 HOURS PRN
Status: DISCONTINUED | OUTPATIENT
Start: 2020-04-01 | End: 2020-04-03 | Stop reason: HOSPADM

## 2020-04-01 RX ORDER — ACETAMINOPHEN 325 MG/1
650 TABLET ORAL EVERY 4 HOURS PRN
Status: DISCONTINUED | OUTPATIENT
Start: 2020-04-01 | End: 2020-04-03 | Stop reason: HOSPADM

## 2020-04-01 RX ORDER — EPHEDRINE SULFATE 50 MG/ML
5 INJECTION, SOLUTION INTRAMUSCULAR; INTRAVENOUS; SUBCUTANEOUS
Status: DISCONTINUED | OUTPATIENT
Start: 2020-04-01 | End: 2020-04-03 | Stop reason: HOSPADM

## 2020-04-01 RX ORDER — FENTANYL CITRATE 50 UG/ML
50-100 INJECTION, SOLUTION INTRAMUSCULAR; INTRAVENOUS
Status: DISCONTINUED | OUTPATIENT
Start: 2020-04-01 | End: 2020-04-03 | Stop reason: HOSPADM

## 2020-04-01 RX ORDER — MISOPROSTOL 200 UG/1
800 TABLET ORAL
Status: DISCONTINUED | OUTPATIENT
Start: 2020-04-01 | End: 2020-04-03 | Stop reason: HOSPADM

## 2020-04-01 RX ORDER — LIDOCAINE HYDROCHLORIDE AND EPINEPHRINE 15; 5 MG/ML; UG/ML
INJECTION, SOLUTION EPIDURAL PRN
Status: DISCONTINUED | OUTPATIENT
Start: 2020-04-01 | End: 2020-04-02 | Stop reason: HOSPADM

## 2020-04-01 RX ORDER — OXYTOCIN/0.9 % SODIUM CHLORIDE 30/500 ML
100 PLASTIC BAG, INJECTION (ML) INTRAVENOUS CONTINUOUS
Status: DISCONTINUED | OUTPATIENT
Start: 2020-04-02 | End: 2020-04-03 | Stop reason: HOSPADM

## 2020-04-01 RX ORDER — OXYTOCIN/0.9 % SODIUM CHLORIDE 30/500 ML
100-340 PLASTIC BAG, INJECTION (ML) INTRAVENOUS CONTINUOUS PRN
Status: DISCONTINUED | OUTPATIENT
Start: 2020-04-01 | End: 2020-04-03 | Stop reason: HOSPADM

## 2020-04-01 RX ADMIN — MISOPROSTOL 800 MCG: 200 TABLET ORAL at 23:37

## 2020-04-01 RX ADMIN — FENTANYL CITRATE 100 MCG: 50 INJECTION, SOLUTION INTRAMUSCULAR; INTRAVENOUS at 14:40

## 2020-04-01 RX ADMIN — SODIUM CHLORIDE, POTASSIUM CHLORIDE, SODIUM LACTATE AND CALCIUM CHLORIDE: 600; 310; 30; 20 INJECTION, SOLUTION INTRAVENOUS at 14:36

## 2020-04-01 RX ADMIN — ROPIVACAINE HYDROCHLORIDE 5 ML: 2 INJECTION, SOLUTION EPIDURAL; INFILTRATION at 16:08

## 2020-04-01 RX ADMIN — Medication 12 ML/HR: at 16:10

## 2020-04-01 RX ADMIN — ROPIVACAINE HYDROCHLORIDE 5 ML: 2 INJECTION, SOLUTION EPIDURAL; INFILTRATION at 16:12

## 2020-04-01 RX ADMIN — SODIUM CHLORIDE, POTASSIUM CHLORIDE, SODIUM LACTATE AND CALCIUM CHLORIDE 1000 ML: 600; 310; 30; 20 INJECTION, SOLUTION INTRAVENOUS at 15:33

## 2020-04-01 RX ADMIN — SODIUM CHLORIDE, SODIUM LACTATE, POTASSIUM CHLORIDE, CALCIUM CHLORIDE AND DEXTROSE MONOHYDRATE: 5; 600; 310; 30; 20 INJECTION, SOLUTION INTRAVENOUS at 15:18

## 2020-04-01 RX ADMIN — LIDOCAINE HYDROCHLORIDE AND EPINEPHRINE 3 ML: 15; 5 INJECTION, SOLUTION EPIDURAL at 16:07

## 2020-04-01 RX ADMIN — Medication 2 MILLI-UNITS/MIN: at 18:29

## 2020-04-01 ASSESSMENT — LIFESTYLE VARIABLES: TOBACCO_USE: 0

## 2020-04-01 NOTE — PLAN OF CARE
1300- no change in cervix. Orquidea KEARNEY CNM updated, plan to recheck again in one hour. pt visibly uncomfortable, tearful, gripping side rails. Pt states pain is 10/10 but does not want any pain medications at the moment.     1400- SVE 2/75/-2, fetal head better applied to cervix. Admission orders received from Orquidea Sandoval CNM. Will move to room 218.

## 2020-04-01 NOTE — ANESTHESIA PROCEDURE NOTES
Procedure note : epidural catheter  Staff -   Anesthesiologist:  Veto Quezada MD      Performed By: anesthesiologist        Pre-Procedure  Performed by Veto Quezada MD  Location: OB      Pre-Anesthestic Checklist: patient identified, IV checked, site marked, risks and benefits discussed, informed consent, monitors and equipment checked, pre-op evaluation and at physician/surgeon's request    Timeout  Correct Patient: Yes   Correct Procedure: Yes   Correct Site: Yes   Correct Laterality: Yes   Correct Position: Yes   Site Marked: Yes   .   Procedure Documentation    .    Procedure: epidural catheter, .   Patient Position:sitting Insertion Site:L2-3  (midline approach) Injection technique: LORT saline and LORT air   Local skin infiltrated with 1 mL of 1% lidocaine.      Patient Prep/Sterile Barriers; povidone-iodine 7.5% surgical scrub.  .  Needle: Touhy needle   Needle Gauge: 17.    Needle Length (Inches) 3.5   # of attempts: 1 and # of redirects:  .    Catheter: 19 G . .  Catheter threaded easily  .  .   .    Assessment/Narrative  Paresthesias: No.  .  .  Aspiration negative for heme or CSF  . Test dose of 3 mL lidocaine 1.5% w/ 1:200,000 epinephrine at. Test dose negative for signs of intravascular, subdural or intrathecal injection. Comments:  Pre-procedure time out completed. Patient in sitting position, the lumbar spine was prepped and draped in sterile fashion. The L2/L3 interspace was identified and local anesthetic was injected for local skin infiltration. A 17 G touhy needle was advanced to the epidural space which was confirmed with the loss of resistance technique at 7 cm. A catheter was then advanced easily into the epidural space. The catheter was left at 11 cm at the skin. Negative aspiration of blood and CSF was confirmed. A test dose of 1.5% lidocaine with 1:200,000 epinephrine was injected through the catheter and was negative for intravascular injection. The site was covered  with sterile tegaderm and the catheter was secured with tape.

## 2020-04-01 NOTE — PROGRESS NOTES
Call to MAC at 1100 to check on pt. after noticing Chloe's name on the Epic MAC dashboard.  RN reports Chloe presented to the MAC with an increase in strength/frequency of contractions since this morning. Contractions are every 2-3 minutes. RN reports pt. appears uncomfortable. SVE 1.5/70-80/-3. Denies LOF or bleeding. Plan to recheck cervix at 1300.    1300:SVE by RN was unchanged. Plan to orally hydrate or option to begin IVF. Recheck cervix in 1 hour. May have Fentanyl for pain if needed. Would consider discharge home with Vistaril if no cervical change at 1400.     1400: Per RN exam, SVE 2/70-80/-2 and pt. is now more uncomfortable with contractions, rating them a 10/10. Upon presentation to the [facility] pain was a 7/10. Will plan to admit pt.  Routine admission orders placed. Initiate Pitocin if needed. Pt. may have an epidural if requested.    1805: Call to L&D to check on pt. status. RN reports Chloe  has an epidural and is comfortable. Contractions have spaced apart to ~5 minutes apart and inquires about beginning Pitocin. Membranes artificially ruptured by Dr. Gonzalez   Pitocin orders placed, RN will initiate. Will plan to call L&D in 2-4 hours for pt. update.     Orquidea BUCKLEY CNM

## 2020-04-01 NOTE — PLAN OF CARE
Pt arrives to maternal assessment center via wheel chair with sister. Sister interpreting for pt at times,  through jabber used for admission questions. Prenatal and medical history reviewed. EFM monitors applied. Pt states she had contractions all day yesterday but today they have gotten more frequent and more uncomfortable, about every 5 minutes. SVE 1-2/75/-3. +FM, denies leaking of fluid or vaginal bleeding. Orquidea Sandoval CNM updated on pt's arrival. Plan to recheck cervix in two hours.

## 2020-04-01 NOTE — H&P
[facility] Labor Admission History & Physical    Chloe Cleaning is a 24 year old  with an IUP at 39w6d  [place]; partnered,   Partner/support Person: Sister  Language Barrier: Faroese  [facility]  Provider: CNM's    Chloe Cleaning is admitted to the [facility] at [facility] on 2020 at 2:13 PM       History of present inllness/Chief Complaint:  Here with: spontaneous onset of labor  Patient reports contractions are Regular           Baby active Yes  Membranes are intact upon admission  Bloody show No   Any changes with medical history since last prenatal visit No    Obstetrical history  Estimated Date of Delivery: 2020 determined by early 1st trimester US  Patient's last menstrual period was 2019.   Dating U/S: 2019    Fetal anatomic survey: Abnormal: Echogenic Focus in R ventricle  Placenta: Anterior    PRENATAL COURSE  Prenatal care began at 12w6d gestation for a total of 13  prenatal visits.  Total wt gain 30 lbs ; BMI 26.28   Prenatal Blood Pressure: WNL  Prenatal course was   complicated by    Patient Active Problem List    Diagnosis Date Noted     Encounter for triage in pregnant patient 2020     Priority: Medium     Indication for care in labor or delivery 2020     Priority: Medium     Vitamin D deficiency 2019     Priority: Medium     Supervision of high-risk pregnancy 2019     Priority: Medium     **HR FOB:Renard  DOM: 2020  A neg/Anterior/Boy!  Genetic:Declines Tdap:     Flu: 10/21  GBS:      Rhogam:  Multiple Sclerosis/4 cm R Ovarian Cyst  Pap PP Vit D Deficiency   Echogenic focus RT vent-ref to MFM  1 hr GCT/Hgb: Passed 103, 9.2             Rh negative state in antepartum period 2019     Priority: Medium     Multiple sclerosis (H) 2015     Priority: Medium     Tdap: 2020  Rhogam: 2020    Patient Active Problem List   Diagnosis     Multiple sclerosis (H)     Supervision of high-risk pregnancy     Rh  negative state in antepartum period     Vitamin D deficiency     Encounter for triage in pregnant patient     Indication for care in labor or delivery       HISTORY  No Known Allergies  Past Medical History:   Diagnosis Date     Conjunctival melanosis, bilateral      Dry eye      Keratitis      Limbal stem cell deficiency      Microcytic anemia      Migraines      Pannus (corneal), bilateral      Past Surgical History:   Procedure Laterality Date     REPAIR RETRACTION LID Left 2017    Procedure: REPAIR RETRACTION LID;  Left Lower Eyelid Retraction Repair with Acellular Dermis Graft and Temporary Tarsorrhaphy, Blunt Suture;  Surgeon: Jessi Chapman MD;  Location: [facility] OR     REPAIR RETRACTION LID Right 2017    Procedure: REPAIR RETRACTION LID;  Right Lower Eyelid Retraction Repair with Acellular Dermis Graft and Temporary Tarsorrhaphy;  Surgeon: Jessi Chapman MD;  Location: [facility] OR     TARSORRHAPHY Left 2017    Procedure: TARSORRHAPHY;;  Surgeon: Jessi Chapman MD;  Location: [facility] OR     TARSORRHAPHY Right 2017    Procedure: TARSORRHAPHY;;  Surgeon: Jessi Chapman MD;  Location: [facility] OR     Family History   Problem Relation Age of Onset     Glaucoma No family hx of      Macular Degeneration No family hx of      Social History     Tobacco Use     Smoking status: Never Smoker     Smokeless tobacco: Never Used   Substance Use Topics     Alcohol use: No     Alcohol/week: 0.0 standard drinks     OB History    Para Term  AB Living   1 0 0 0 0 0   SAB TAB Ectopic Multiple Live Births   0 0 0 0 0      # Outcome Date GA Lbr Jonnathan/2nd Weight Sex Delivery Anes PTL Lv   1 Current                LABS:  Lab Results   Component Value Date    ABO A 2019    RH Neg 2019    AS Neg 2019    HGB 11.2 (L) 2020    HEPBANG Nonreactive 2019       GBS Status:   Lab Results   Component Value Date    GBS Negative 03/10/2020     Rubella: Immune    HIV: Non-Reactive   Platelets:   174  1hr GCT:  103    ROS   Pt is alert and oriented  Pt denies significant constitutional symptoms including fever and/or malaise.    Pt denies significant respiratory, cardiovacular, GI, or muscular/skeletal complaints.    Neuro: Denies HA and visual changes  Muscoloskeletal: Denies except for discomforts r/t pregnancy     PHYSICAL EXAM:  /76   Temp 98.6  F (37  C) (Temporal)   Resp 16   LMP 2019   General appearance:  healthy, alert, active and no distress   Heart: RRR  Lungs: CTA bilaterally, normal respiratory effort  Abdomen: gravid, single vertex fetus, non-tender, EFW 7 lbs.       Contractions: Upon admission pt is zachariah every 2-3 minutes, lasting 50-60 seconds and palpates mild    Fetal heart tones: Baseline 130   Variability: Moderate   Accelerations: Present  Decelerations: Absent    NST: Reactive    Cervix: 2/ 70-80/ -2 RN exam, Vtx  Bloody show: No  Membranes:  Intact at admission    ASSESSMENT:  24 year old  with lerner IUP 39w6d in early labor  NST Reactive  GBS Negative and membranes intact  Hx of Multiple Sclerosis  Rh negative    PLAN:  Admit - see IP orders  Routine CNM care  Labs ordered: CBC, anti-treponema, T&H  Teaching done r/t comfort measures, pain management options, and labor processes.  Pain medication: May have IV pain medications or epidural when requested.   Anticipate     INA Justice CNM

## 2020-04-01 NOTE — ANESTHESIA PREPROCEDURE EVALUATION
Anesthesia Pre-Procedure Evaluation    Patient: Chloe Cleaning   MRN: [id number] : 1995          Preoperative Diagnosis: * No surgery found *        Past Medical History:   Diagnosis Date     Conjunctival melanosis, bilateral      Dry eye      Keratitis      Limbal stem cell deficiency      Microcytic anemia      Migraines      Pannus (corneal), bilateral      Past Surgical History:   Procedure Laterality Date     REPAIR RETRACTION LID Left 2017    Procedure: REPAIR RETRACTION LID;  Left Lower Eyelid Retraction Repair with Acellular Dermis Graft and Temporary Tarsorrhaphy, Blunt Suture;  Surgeon: Jessi Chapman MD;  [facility]: [facility]     REPAIR RETRACTION LID Right 2017    Procedure: REPAIR RETRACTION LID;  Right Lower Eyelid Retraction Repair with Acellular Dermis Graft and Temporary Tarsorrhaphy;  Surgeon: Jessi Chapman MD;  [facility]     TARSORRHAPHY Left 2017    Procedure: TARSORRHAPHY;;  Surgeon: Jessi Chapman MD;  [facility]: [facility]     TARSORRHAPHY Right 2017    Procedure: TARSORRHAPHY;;  Surgeon: Jessi Chapman MD;  Location: [facility]       Anesthesia Evaluation       history and physical reviewed .      No history of anesthetic complications          ROS/MED HX    ENT/Pulmonary:  - neg pulmonary ROS    (-) tobacco use   Neurologic:  - neg neurologic ROS   (+)migraines, Multiple Sclerosis     Cardiovascular:  - neg cardiovascular ROS       METS/Exercise Tolerance:  >4 METS   Hematologic:     (+) Anemia, -      Musculoskeletal:         GI/Hepatic:  - neg GI/hepatic ROS       Renal/Genitourinary:         Endo:         Psychiatric:         Infectious Disease:         Malignancy:         Other:                     neg OB ROS            Physical Exam  Normal systems: cardiovascular, pulmonary and dental    Airway   Mallampati: II  TM distance: > 3 FB  Neck ROM: full  Mouth opening: > 3 cm    Dental     Cardiovascular       Pulmonary             Lab Results   Component Value  "Date    WBC 8.4 04/01/2020    HGB 13.2 04/01/2020    HCT 39.0 04/01/2020     04/01/2020    SED 31 (H) 09/03/2015     05/30/2018    POTASSIUM 4.0 05/30/2018    CHLORIDE 109 05/30/2018    CO2 22 05/30/2018    BUN 9 05/30/2018    CR 0.63 05/30/2018    GLC 88 05/30/2018    MARGARITA 8.8 05/30/2018    ALBUMIN 3.9 05/30/2018    PROTTOTAL 8.0 05/30/2018    ALT 12 05/30/2018    AST 9 05/30/2018    ALKPHOS 62 05/30/2018    BILITOTAL 0.4 05/30/2018    LIPASE 74 05/30/2018    TSH 0.94 09/25/2019    HCG Negative 11/19/2017    HCGS Negative 05/30/2018       Preop Vitals  BP Readings from Last 3 Encounters:   04/01/20 108/76   03/31/20 108/66   03/24/20 102/64    Pulse Readings from Last 3 Encounters:   11/08/19 72   09/25/19 82   05/30/18 78      Resp Readings from Last 3 Encounters:   04/01/20 16   05/30/18 18   04/06/18 16    SpO2 Readings from Last 3 Encounters:   05/30/18 100%   04/06/18 100%   11/19/17 100%      Temp Readings from Last 1 Encounters:   04/01/20 37  C (98.6  F) (Temporal)    Ht Readings from Last 1 Encounters:   11/08/19 1.549 m (5' 0.98\")      Wt Readings from Last 1 Encounters:   03/31/20 76.7 kg (169 lb)    Estimated body mass index is 31.95 kg/m  as calculated from the following:    Height as of 11/8/19: 1.549 m (5' 0.98\").    Weight as of 3/31/20: 76.7 kg (169 lb).       Anesthesia Plan      History & Physical Review      ASA Status:  2 .  OB Epidural Asa: 2   NPO Status:  > 8 hours         Postoperative Care      Consents  Anesthetic plan, risks, benefits and alternatives discussed with:  Patient..                 Veto Quezada MD  "

## 2020-04-01 NOTE — PLAN OF CARE
Pt ambulates to room 218. All belongings sent with pt.  Bedside report to Daryl Vinson RN.  Care taken over.

## 2020-04-01 NOTE — PLAN OF CARE
Patient is transferred to L&D, Room 218, via ambulatory.   Patient is accompanied by Freida NARANJO RN and pt's friend.  Patient is oriented to room, how to call for help and instructed to call RN before getting out of bed and/or not to get out of bed without nursing staff in room to help.     This RN assumes care at 1418

## 2020-04-02 PROBLEM — Z37.9 VACUUM-ASSISTED VAGINAL DELIVERY: Status: ACTIVE | Noted: 2020-04-02

## 2020-04-02 LAB
ABO + RH BLD: ABNORMAL
ABO + RH BLD: ABNORMAL
BLD GP AB INVEST PLASRBC-IMP: ABNORMAL
BLD GP AB SCN SERPL QL: ABNORMAL
BLOOD BANK CMNT PATIENT-IMP: ABNORMAL
BLOOD BANK CMNT PATIENT-IMP: NORMAL
HGB BLD-MCNC: 11.5 G/DL (ref 11.7–15.7)
SPECIMEN EXP DATE BLD: ABNORMAL
T PALLIDUM AB SER QL: NONREACTIVE

## 2020-04-02 PROCEDURE — 12000035 ZZH R&B POSTPARTUM

## 2020-04-02 PROCEDURE — 86900 BLOOD TYPING SEROLOGIC ABO: CPT | Performed by: ADVANCED PRACTICE MIDWIFE

## 2020-04-02 PROCEDURE — 36415 COLL VENOUS BLD VENIPUNCTURE: CPT | Performed by: ADVANCED PRACTICE MIDWIFE

## 2020-04-02 PROCEDURE — 25000132 ZZH RX MED GY IP 250 OP 250 PS 637: Performed by: ADVANCED PRACTICE MIDWIFE

## 2020-04-02 PROCEDURE — 85018 HEMOGLOBIN: CPT | Performed by: ADVANCED PRACTICE MIDWIFE

## 2020-04-02 RX ADMIN — DOCUSATE SODIUM 100 MG: 100 CAPSULE, LIQUID FILLED ORAL at 09:08

## 2020-04-02 RX ADMIN — DOCUSATE SODIUM 100 MG: 100 CAPSULE, LIQUID FILLED ORAL at 21:25

## 2020-04-02 RX ADMIN — ACETAMINOPHEN 650 MG: 325 TABLET, FILM COATED ORAL at 14:46

## 2020-04-02 RX ADMIN — IBUPROFEN 800 MG: 400 TABLET, FILM COATED ORAL at 14:46

## 2020-04-02 RX ADMIN — ACETAMINOPHEN 650 MG: 325 TABLET, FILM COATED ORAL at 06:31

## 2020-04-02 RX ADMIN — IBUPROFEN 800 MG: 400 TABLET, FILM COATED ORAL at 22:28

## 2020-04-02 RX ADMIN — HYDROCODONE BITARTRATE AND ACETAMINOPHEN 1 TABLET: 5; 325 TABLET ORAL at 16:53

## 2020-04-02 RX ADMIN — ACETAMINOPHEN 650 MG: 325 TABLET, FILM COATED ORAL at 00:29

## 2020-04-02 RX ADMIN — HYDROCODONE BITARTRATE AND ACETAMINOPHEN 1 TABLET: 5; 325 TABLET ORAL at 22:28

## 2020-04-02 RX ADMIN — IBUPROFEN 800 MG: 400 TABLET, FILM COATED ORAL at 00:29

## 2020-04-02 RX ADMIN — IBUPROFEN 800 MG: 400 TABLET, FILM COATED ORAL at 06:30

## 2020-04-02 ASSESSMENT — ACTIVITIES OF DAILY LIVING (ADL)
RETIRED_COMMUNICATION: 0-->UNDERSTANDS/COMMUNICATES WITHOUT DIFFICULTY
AMBULATION: 0-->INDEPENDENT
TOILETING: 0-->INDEPENDENT
DRESS: 0-->INDEPENDENT
TRANSFERRING: 0-->INDEPENDENT
FALL_HISTORY_WITHIN_LAST_SIX_MONTHS: NO
COGNITION: 0 - NO COGNITION ISSUES REPORTED
RETIRED_EATING: 0-->INDEPENDENT
SWALLOWING: 0-->SWALLOWS FOODS/LIQUIDS WITHOUT DIFFICULTY
BATHING: 0-->INDEPENDENT

## 2020-04-02 NOTE — L&D DELIVERY NOTE
Vacuum delivery of viable male    Vacuum performed for persistent decelerations with pushing, lates   Vacuum on for 3 pulls/CTXs with 3 pop-offs.   Baby was in *OP position, +4 station, EFW 7lb   Alternative strategies were discussed.   Consent Obtained   Surgical and resuscitation teams were available.   Baby's weight 6 lbs. 9oz.     Apgars 8, 9   ebl 250               Second degree episiotomy due to needing room for vacum, very tight introitus  Third degree extension  loos nuchal cord  Very small placenta  Extensive caput so vacum didn't stick well so hard to get suction up into green zone

## 2020-04-02 NOTE — L&D DELIVERY NOTE
Delivery Summary    Chloe Cleaning MRN# [id number]   Age: 24 year old YOB: 1995     CNM in room at 2215. FHR decelerations into the 50-90s lasting 60-90 seconds. Pitocin off at 2208. Position changes utilized. In house MD called into pt. room, present at bedside at 2220. Dr. Gonzalez called at the same time and en route to hospital.  Fetal HR recovered at 2221 into the 140s. In house MD recommended pt. to labor down and decrease epidural rate. Rate decreased to 6 at 2224.   Dr. Gonzalez on L&D unit at 2240. EFM strip reviewed. Plan made to beginning pushing.   Pt. set up for pushing. Pushing began at 2253. Minimal fetal descent with maternal pushing effort. Deep variables with each pushing effort. Dr. Gonzalez at bedside at 2310. Decision made to attempt a vacuum assisted delivery. See MD note for further detail of vacuum attempt.    Delivery Note    IUP at 40w0d gestation delivered on 2020.     delivery of a viable. Vacuum attempt with 3 pop-offs Male infant.  Apgars of 8 at 1 minute and 9 at 5 minutes.  NNP present for delivery: Yes  Labor was augmented with AROM and Pitocin   Medications administered  in labor:  Pain Rx: Epidural. Antibiotics: No.   Perineum: 3rd degree and R mediolateral episiotomy cut by Dr. Gonzalez. Repaired: Yes by Dr. Gonzalez  Delayed cord clamping: Yes   Placenta-mechanism: spontaneous, intact,  with a 3 vessel cord. IV oxytocin was given and 800 mcg of rectal cytotec. Hospital disposal of placenta.   Cord blood collected. Gases not collected.   Estimated Blood Loss:  250 cc's  Complications of pregnancy, labor and delivery: Malpresentation. 3rd degree laceration with a R mediolateral episiotomy. Category 2 fetal tracing        Labor Event Times    Labor onset date:  20 Onset time:   5:00 PM      Rupture date/time: 20 1705   Rupture type:  Artificial Rupture of Membranes  Fluid color:  Clear  Fluid odor:  Normal     Delivery/Placenta Date and Time    Delivery Date:   "20 Delivery Time:  11:19 PM   Placenta Date/Time:  2020 11:23 PM     Vaginal Counts          Needles Suture [place] Sponges Instruments   Initial counts       Added to count       Final counts       Placed during labor Accounted for at the end of labor   No NA   No NA   No NA           Apgars    Living status:  Living   1 Minute 5 Minute 10 Minute 15 Minute 20 Minute   Skin color: 0  1       Heart rate: 2  2       Reflex irritability: 2  2       Muscle tone: 2  2       Respiratory effort: 2  2       Total: 8  9       Apgars assigned by:  HILARIO JORGENSEN RN     Cord    Vessels:  3 Vessels Complications:  Nuchal   Cord Blood Disposition:  Lab Gases Sent?:  No      [place] Resuscitation    Methods:  None  [place] Care at Delivery:  Requested to attend delivery due to vacuum assist.   Infant vigorous at delivery. No direct care provided.   Ginny Marieel, APRN CNP 2020 2319 hours     [place] Measurements    Weight:  6 lb 9.1 oz Length:  1' 8\"   Head circumference:  33.7 cm       Skin to Skin and Feeding Plan    Skin to skin initiation date/time: 1841    Skin to skin with:  Mother  Skin to skin end date/time:     How do you plan to feed your baby:  Breastfeeding     Labor Events and Shoulder Dystocia    Fetal Tracing Prior to Delivery:  Category 2  Fetal Tracing Comments:  Repetative deep variable and late declerations.   Shoulder dystocia present?:  Neg     Delivery (Maternal) (Provider to Complete) (729190)    Episiotomy:  Right Mediolateral  Indications for episiotomy:  Instrumented Delivery  Perineal lacerations:  3rd Repaired?:  Yes      Blood Loss  Mother: Chloe Cleaning #[id number]   Start of Mother's Information    IO Blood Loss  20 1700 - 20 0020    None           End of Mother's Information  Mother: Chloe Cleaning #[id number]         Delivery - Provider to Complete ([id number])    Delivering clinician:  Sherice Gonzalez MD  CNM Care:  Any CNM care in labor  Attempted Delivery Types (Choose all that " apply):  Vacuum (Extractor)  Delivery Type (Choose the 1 that will go to the Birth History):  Vaginal, Vacuum (Extractor)  Verbal Informed Consent Obtained For Vacuum:  Yes Alternate Labor Strategies Discussed (vacuum):  Yes   Emergency Resources Available (vacuum):  Resuscitation Team  Position (vacuum):  OP    Indication for Operative Vaginal Delivery (vacuum):  Fetal Status  Operative Vaginal Delivery Brief Note Vacuum:  Refer to MD dictation for further detail on  delivery and vacuum information.    Other personnel:   Provider Role   Orquidea Sandoval APRN CNM Certified Nurse Midwife   Laya Hargrove, RN Registered Nurse   Leandra Crow RN Registered Nurse   Robb, INA Schaffer CNP Nurse Practitioner         Placenta    Delayed Cord Clamping:  Done  Date/Time:  2020 11:23 PM  Removal:  Spontaneous  Disposition:  Hospital disposal     Presentation and Position    Presentation:  Vertex   Occiput Posterior       Assessment/Plan:     Vacuum attempt ( 3 pop-offs)    over a R mediolateral episiotomy  S/P repaired episiotomy and 3rd degree laceration.    Fundus firm, vaginal bleeding appropriate.    Routine PP care    Planning to breastfeed, lactation to round    Will consider IV iron transfusion if significant decrease from admission Hgb level.     Hgb level in the am    Rh negative. Rhogam work-up pending    Anticipate discharge on PPD #2      INA Justice CNM

## 2020-04-02 NOTE — L&D DELIVERY NOTE
Delivery Date:  2020      I was called in on consultation for a midwife service due to fetal heart tone abnormalities.  On my arrival, the baby had improved and the patient was complete, so they did some intrauterine resuscitation and I waited to be available as needed.  They started pushing and eventually the patient was having repetitive late decelerations with every push and had a still pretty dense epidural and was not bringing the baby all the way down by herself, so I counseled the patient that a vacuum trial would be a worthwhile option at this point, because the alternative would be  section due to fetal intolerance.  Consent was obtained.  NNP was present and at that point, the baby's head was occiput posterior position, +4 station.  She had extensive caput but not extensive molding.  Vacuum was applied.  The patient had a very narrow introitus.  With the first pull on the vacuum, the suction did not stay well attached and so we switched to a different style of vacuum.  We pushed with the next contraction and the head came down nicely, but was still having trouble maintaining suction due to the patient's preexisting caput.  Pulled one more time and I did cut a second-degree mediolateral episiotomy at that point, due to clearly needing extra room and vacuum once again pulled off for the third time, apparently with inadequate suction.  By that point, the baby's head was half  and she was able to finish pushing and deliver the head.  It came out straight OP.  There was a very loose nuchal cord.  It did not appear to have any sort of constriction around the baby's neck and the baby's head as it delivered extended the episiotomy into a third-degree laceration.  Baby was delivered easily.  There were no issues with the shoulders.  Baby was delivered onto the maternal abdomen.  We did delayed cord clamping.  Baby weighed 6 pounds 9 ounces, Apgars were 8 and 9.  Placenta delivered  spontaneously.  It was abnormally small but intact.  We repaired the third-degree laceration in layers with 2-0 Vicryl.  Figure-of-eight sutures were placed to pull the sphincter back together and a standard multilayer episiotomy repair was done with 2-0 Vicryl.  She continued to have oozing at that point and her measured blood loss was pretty low.  We measured 250 mL, but she continued to have existing bleeding.  I palpated the lower segment and could tell the uterus was well contracted but the lower segment was ballooned and so we went ahead and gave her rectal Cytotec just to prevent excessive continued bleeding in the face of clearly low blood supplies during the COVID pandemic.  The patient responded well and was taken to recovery in stable condition.            SARATH HAMMOND MD             D: 2020   T: 2020   MT: TONYA      Name:     HIEN LOZANO   MRN:      [id number]        Account:        [id number]   :      1995        Delivery Date:  2020               Document: [id number]

## 2020-04-02 NOTE — ANESTHESIA POSTPROCEDURE EVALUATION
Patient: Chloe Cleaning    * No procedures listed *    Diagnosis:* No pre-op diagnosis entered *  Diagnosis Additional Information: No value filed.    Anesthesia Type:  No value filed.    Note:  Anesthesia Post Evaluation    Patient location during evaluation: Bedside and Floor  Patient participation: Able to fully participate in evaluation  Level of consciousness: awake  Pain management: adequate  Airway patency: patent  Cardiovascular status: acceptable  Respiratory status: acceptable  Hydration status: acceptable  PONV: none     Anesthetic complications: None          Last vitals:  Vitals:    04/02/20 0400 04/02/20 0800 04/02/20 1625   BP: 107/63 98/62 104/79   Pulse: 58 64 70   Resp: 16 16 16   Temp: 36.9  C (98.4  F) 36.7  C (98.1  F) 36.7  C (98  F)   SpO2:            Electronically Signed By: Keyshawn Marshall MD  April 2, 2020  5:01 PM

## 2020-04-02 NOTE — PROGRESS NOTES
"OB In House  Called to bedside for decreased FHT, consult for possible assisted delivery.   CNM at bedside    Patient comfortable with epidural, not feeling urge to push.  /54   Temp 99  F (37.2  C)   Resp 16   LMP 2019   SpO2 96%      Cx: C/+1-+2 per CNM exam at bedside  Rapid progression to Complete  FHT baseline 120 with repetitive decels to 60s with ctxns, Recovery to baseline with positional changes, and cease with pushing.   Ctxns: q2\" pitocin now off.   Patient not feeling urge to push, poor maternal effort with pushing.    A/P: 23 yo  @ 39 6/7 weeks, active labor, quick progression to Complete dilation  Suspect rapid fetal descent as etiology for change in FHT. FHT now recovered.   Recommend Primary MD to come to bedside.  Suggest positional changes to assist with fetal descent.   Would avoid having patient push until she has urge to push.   Keep pitocin off.  Suggest decreasing epidural to see if that may help with maternal urge to push.   Plan reviewed with managing CNM.  Nanda Whittington MD, MD on 2020 at 10:34 PM    "

## 2020-04-02 NOTE — PROGRESS NOTES
CNM PROGRESS NOTE    SUBJECTIVE:  Resting comfortably. Epidural working well. Sister in room providing support.     OBJECTIVE:  /54   Temp 99  F (37.2  C)   Resp 16   LMP 2019   SpO2 96%     Fetal heart tones: Baseline 120  Variability: Moderate  Accelerations: present  Decelerations: Present. Occasional late deceleration, resolves with position changes. Early decelerations noted.     Contractions: Pt is zachariah every 3-6.5 minutes, lasting  seconds and palpates moderate  Coupling of contractions noted     Cervix: 5/ 90% / -1, RN exam at  Vtx  ROM: Clear fluid    Pitocin- 4 mu/min.  Antibiotics- None  Cervical ripening: N/A    ASSESSMENT:  IUP @ 39w6d early labor   GBS- negative  Rh negative  Membranes ruptured x 4.5 hours, clear fluid  Hx of MS  Pain well managed, epidural in place.   Coupling of contractions. Suspect asynclitic fetal position     PLAN:   Continue regular position changes with peanut ball to facilitate with fetal positioning.  Comfort measures prn   Pain medication:Continue with epidural  Labor augmentation with Pitocin, increase per protocol  Anticipate   Reevaluate in 2-4 hours/PRN    INA Justice CNM

## 2020-04-02 NOTE — PROGRESS NOTES
Chloe Cleaning  2020    S: pt doing well.  Patient sleeping during interaction, family present.  Marjorie Stewart planning to see    O: BP 98/62 (BP Location: Right arm)   Pulse 64   Temp 98.1  F (36.7  C) (Oral)   Resp 16   LMP 2019   SpO2 96%   Breastfeeding Unknown   Recent Labs   Lab 20  0744 20  1432   HGB 11.5* 13.2         A/P: s/p  PPD #1, vacuum assisted delivery  Doing well  Continue routine post partum care  Discharge planning for tomorrow    Sharon Ruth MD

## 2020-04-02 NOTE — PLAN OF CARE
Vitals stable. See previous notes regarding voiding and reyna placement. Adequate urine output. Ice to perineum. Encouraged ambulation. Working on breastfeeding but infant very sleepy and spitty. Pt started pumping this afternoon. Cousin in room helping interpret and very supportive.

## 2020-04-02 NOTE — PROVIDER NOTIFICATION
Dr. Ruth notified pt straight cath for 1500 at 0600 and unable to void at 1000. Would like reyna placed and remain in until morning.

## 2020-04-02 NOTE — PLAN OF CARE
Patient complained of pain 10/10 when nurse first assessed her.  She had a third degree repair and nurse encouraged her to use ice pack and tucks frequently.  She also has an order for Norco q 4 which nurse started her on.  Patient has a reyna that is draining well.  All questions answered.  Nurse will also try to find helpful information written in [place]ik as well as English.

## 2020-04-02 NOTE — PLAN OF CARE
Data: Chloe Cleaning transferred to Rm 423 via wheelchair at 0145. Baby transferred via moms arms.  Action: Receiving unit notified of transfer: Yes. Patient and family notified of room change. Report given to Rosalee Romero RN at 0150. Belongings sent to receiving unit. Accompanied by Registered Nurse. Oriented patient to surroundings. Call light within reach. ID bands double-checked with receiving RN.  Response: Patient tolerated transfer and is stable.

## 2020-04-02 NOTE — PLAN OF CARE
VSS on RA.  Fundus firm, midline, U/U.  Scant lochia rubra.  Pt attempted to void multiple times, but was unable to go.  Bladder scanned for 999+.  Straight cathed for 1500mL at 0600, due to void.  Up w/SBA.  Pain managed w/ibuprofen and Tylenol.  Breastfeeding poor.  Supplemented w/formula via bottle x1 after birth.  Bonding well w/infant.  Cousin at bedside and supportive.  Nursing to continue to monitor.

## 2020-04-02 NOTE — PROVIDER NOTIFICATION
04/01/20 2230   Uterine Activity Assessment   Method external tocotransducer   Contraction Frequency (Minutes) 2-3   Contraction Duration (seconds) 60-80   Contraction Intensity moderate by palpation   Uterine Resting Tone soft by palpation   Fetal Assessment   Fetal HR Assessment Method external US   Fetal HR (beats/min) 130   Fetal Heart Baseline Rate normal range   Fetal HR Variability moderate (amplitude range 6 to 25 bpm)   Fetal HR Accelerations absent   Fetal HR Decelerations late;variable     Decel noted down in the 60's. Turned pt to left side. Briefly recovered back to baseline and FHT decelerations back to 60's lasting 60 to 90 seconds. Pitocin turned off at 2208. FHT back to baseline with position changes. Orquidea Sandoval CNM bedside at 2215. Per KENIA SVE complete. Dr. Garces in house MD bedside at 2220. Per MD let pt labor down and decrease epidural to 6ml. Will continue to monitor.

## 2020-04-02 NOTE — PROGRESS NOTES
Ebenezer Wong CNM Progress Note: Postpartum Day #1    April 2, 2020  11:06 AM    SUBJECTIVE:  Patient is stable and is is not tolerating activity well, patient complains of not being able to urinate  Baby is rooming in  Complications since 2 hours post delivery:  unable to urinate  Pain is well controlled.  Patient is taking pain medications.  Breastfeeding status:initiated and latching difficulties   Elimination:  She is voiding with difficulty.  She has not had a bowel movement  Desired contraception not asked  Denies heavy bleeding and passing large clots.  Feels happy about birth experience.    OBJECTIVE:  BP 98/62 (BP Location: Right arm)   Pulse 64   Temp 98.1  F (36.7  C) (Oral)   Resp 16   LMP 07/05/2019   SpO2 96%   Breastfeeding Unknown     Constitutional: healthy, alert and mild distress    Breasts: Currently breastfeeding    Fundus: Uterine fundus is firm, non-tender and at the level of the umbilicus per RN flowsheet    Perineum: Perineum is well approximated, minimal swelling    Lochia: Lochia is appropriate for the duration of time since delivery.     ASSESSMENT:  PPD #1  Vacuum assisted vaginal delivery  Pain well-controlled.  Urinary retention  3rd degree laceration  Hemoglobin   Date Value Ref Range Status   04/02/2020 11.5 (L) 11.7 - 15.7 g/dL Final   ]      PLAN:  Continue routine care  Lactation consultation  Kan catheter and urinary retention management per Dr. Ruth  Reviewed breastfeeding  Reviewed postpartum warning signs  Reviewed postpartum blues and postpartum depression warning signs  Plans unsure for contraception postpartum  Anticipated discharge 4/3/2020        INA Argueta CNM

## 2020-04-02 NOTE — LACTATION NOTE
"Initial visit with Chloe, her cousin, and baby boy. Chloe does not speak English, her cousin is present at bedside and interprets for Chloe. Per Primary RN, infant has been breastfeeding very poorly, he has been very spitty and sleepy. Even bottle feeding not going well. Primary RN did start Chloe pumping this afternoon. Chloe was pumping at time of visit. She was able to pump drops of colostrum, which LC fed to infant on a gloved finger. LC reviewed pump set-up with the cousin and discussed how to clean pumping equipment. Encouraged the cousin to call for Lactation support if/when infant becomes more awake/alert.    Reviewed general breastfeeding information with the cousin, along with the breastfeeding section in [facility] patient education booklet. Chloe and cousin educated to typical  feeding patterns and how to know when infant is done at the breast. Encouraged skin to skin prior to breastfeeding to promote better breastfeeding outcomes. We also discussed \"cluster-feeding ;\" what it is and when to expect it. Reviewed pumping and physiology of milk supply and production.    Feeding plan: Recommend unlimited, exclusive, and frequent breast feedings (reviewed early feeding cues): At least 8 - 12 times every 24 hours. Bottle supplementation with formula also attempted.    Will follow as needed.    Leonor Farrell RN, Lactation Educator   "

## 2020-04-03 VITALS
RESPIRATION RATE: 16 BRPM | SYSTOLIC BLOOD PRESSURE: 100 MMHG | HEART RATE: 70 BPM | TEMPERATURE: 98.4 F | DIASTOLIC BLOOD PRESSURE: 72 MMHG | OXYGEN SATURATION: 96 %

## 2020-04-03 PROBLEM — Z36.89 ENCOUNTER FOR TRIAGE IN PREGNANT PATIENT: Status: RESOLVED | Noted: 2020-04-01 | Resolved: 2020-04-03

## 2020-04-03 PROCEDURE — 25000132 ZZH RX MED GY IP 250 OP 250 PS 637: Performed by: ADVANCED PRACTICE MIDWIFE

## 2020-04-03 RX ORDER — IBUPROFEN 800 MG/1
800 TABLET, FILM COATED ORAL EVERY 6 HOURS PRN
Qty: 60 TABLET | Refills: 0 | Status: SHIPPED | OUTPATIENT
Start: 2020-04-03 | End: 2024-01-02

## 2020-04-03 RX ORDER — ACETAMINOPHEN 500 MG
1000 TABLET ORAL EVERY 6 HOURS PRN
Qty: 60 TABLET | Refills: 0 | Status: SHIPPED | OUTPATIENT
Start: 2020-04-03 | End: 2024-01-02

## 2020-04-03 RX ORDER — DOCUSATE SODIUM 100 MG/1
100 CAPSULE, LIQUID FILLED ORAL 2 TIMES DAILY
Qty: 90 CAPSULE | Refills: 0 | Status: SHIPPED | OUTPATIENT
Start: 2020-04-03 | End: 2024-01-02

## 2020-04-03 RX ADMIN — DOCUSATE SODIUM 100 MG: 100 CAPSULE, LIQUID FILLED ORAL at 08:03

## 2020-04-03 RX ADMIN — IBUPROFEN 800 MG: 400 TABLET, FILM COATED ORAL at 04:31

## 2020-04-03 RX ADMIN — IBUPROFEN 800 MG: 400 TABLET, FILM COATED ORAL at 11:03

## 2020-04-03 RX ADMIN — HYDROCODONE BITARTRATE AND ACETAMINOPHEN 1 TABLET: 5; 325 TABLET ORAL at 08:03

## 2020-04-03 RX ADMIN — HYDROCODONE BITARTRATE AND ACETAMINOPHEN 1 TABLET: 5; 325 TABLET ORAL at 03:33

## 2020-04-03 NOTE — CONSULTS
Care Transition Initial Assessment - SW     Met with: Patient and cousin acting as interpretor    Active Problems:    Encounter for triage in pregnant patient    Indication for care in labor or delivery    Vacuum-assisted vaginal delivery       DATA  Lives With: mother and sister  Who is your support system?: Sibling(s), mother, cousin,  from afar  Identified issues/concerns regarding health management: post partum potential     ASSESSMENT  Cognitive Status:  awake, alert, oriented  Concerns to be addressed: Postpartum information provided.  SW consulted to assist with discharge planning, emotional support and transportation arrangements.  Pt is a 24 yr old  [place] women who admitted on 4/2/20, for the delivery of her first child. Patient explained that her  is in [place] and awaiting his VISA. Patient currently living with her mother and sister who will be assisting with babies needs upon return home. Patient's cousin allowed and present in hospital room for interpretor purposes.     Patient denies history of depression or anxiety. States that she has equipment and supplies as needed. SW noted that clinic CC is following patient, providing resources, etc.     Patient states no questions or concerns and anticipates discharging to home later today.    INTERVENTION  SW provided education around post partum depression, obtained anad provided [place] literature to provide for HOPE program with contact numbers for resources.      PLAN  Patient anticipates discharging to:  Home with family.    SW has identified no other social service needs at this time. SW will remain available should other needs arise.    MIKAYLA Stout  Daytime (8:00am-4:30pm): [phone]  After-Hours SW Pager (4:30pm-11:30pm): [phone]

## 2020-04-03 NOTE — PLAN OF CARE
D: VSS, assessments WDL.   I: Pt. received complete discharge paperwork and home medications as filled by discharge pharmacy.  Pt. was given times of last dose for all discharge medications in writing on discharge medication sheets.  Discharge teaching included home medication, pain management, activity restrictions, postpartum cares, and signs and symptoms of infection along with Pt's cousin (who's helping with interpreting).    A: Discharge outcomes on care plan met.  Mother states understanding and comfort with self and baby cares.  P: Pt. discharged to home.  Pt. was discharged with baby, and bands were checked at time of discharge.  Pt. was accompanied by cousin, nurse and baby, and left with personal belongings.  Home care ordered.  Pt. to follow up with OB per MD order.  Pt. had no further questions at the time of discharge and no unmet needs were identified.

## 2020-04-03 NOTE — PLAN OF CARE
Haven Petersen called to the floor and asked RN to discontinue Pt's reyna.  Reyna discontinued without any difficulty.  Continues to monitor Pt.

## 2020-04-03 NOTE — PROGRESS NOTES
[facility]    Discharge Summary  Obstetrics    Date of Admission:  2020  Date of Discharge:  4/3/2020  Discharging Provider: Delisa Butt  Date of Service (when I saw the patient): 20    Discharge Diagnoses   VAVD    History of Present Illness   Chloe Cleaning is a 24 year old female who presented with spontaneous labor    Hospital Course   The patient's hospital course was unremarkable.  She recovered as anticipated and experienced no post-delivery complications. On discharge, her pain was well controlled. Vaginal bleeding is stable.  Voiding without difficulty.  Ambulating well and tolerating a normal diet.  No fever.  Working on breastfeeding, pumping.  Infant is stable.  She was discharged on post-partum day #2.    Post-partum hemoglobin:   Hemoglobin   Date Value Ref Range Status   2020 11.5 (L) 11.7 - 15.7 g/dL Final       Delisa Butt, APRN CNM    Discharge Disposition   Discharged to home   Condition at discharge: Stable    Primary Care Physician   Physician No Ref-Primary    Consultations This Hospital Stay   SOCIAL WORK IP CONSULT  ANESTHESIOLOGY IP CONSULT  HOME CARE POST PARTUM/ IP CONSULT  LACTATION IP CONSULT    Discharge Orders      Activity    Review discharge instructions     Reason for your hospital stay    Maternity care     Follow Up and recommended labs and tests    Follow up with [facility] in 6 weeks for routine postpartum visit     Discharge Instructions - Postpartum visit    Schedule postpartum visit with your provider and return to clinic in 6 weeks.     Diet    Resume previous diet     Discharge Medications   Current Discharge Medication List      START taking these medications    Details   acetaminophen (TYLENOL) 500 MG tablet Take 2 tablets (1,000 mg) by mouth every 6 hours as needed for mild pain or fever  Qty: 60 tablet, Refills: 0    Associated Diagnoses: Supervision of high risk pregnancy,  antepartum      ibuprofen (ADVIL/MOTRIN) 800 MG tablet Take 1 tablet (800 mg) by mouth every 6 hours as needed for other (cramping)  Qty: 60 tablet, Refills: 0    Associated Diagnoses: Indication for care in labor or delivery         CONTINUE these medications which have CHANGED    Details   docusate sodium (COLACE) 100 MG capsule Take 1 capsule (100 mg) by mouth 2 times daily  Qty: 90 capsule, Refills: 0    Associated Diagnoses: Indication for care in labor or delivery         CONTINUE these medications which have NOT CHANGED    Details   carboxymethylcellulose (REFRESH PLUS) 0.5 % SOLN ophthalmic solution Place 1 drop into both eyes 5 times daily  Qty: 1 each, Refills: 11    Comments: Preservative free eye drops  Associated Diagnoses: Limbal stem cell deficiency, bilateral; Pannus (corneal), bilateral; Dry eyes, bilateral      cycloSPORINE (RESTASIS) 0.05 % ophthalmic emulsion Place 1 drop into both eyes 2 times daily  Qty: 60 each, Refills: 11    Associated Diagnoses: Limbal stem cell deficiency, bilateral; Pannus (corneal), bilateral; Dry eyes, bilateral      hypromellose (GENTEAL) ophthalmic gel 0.3% Place 0.1 g (2 drops) into both eyes At Bedtime Apply approximately a half inch ribbon of ointment to the inside of your lower lids.  Warning, application of the ointment to your eyes will cause temporary blurring of your vision from the ointment coating your eye.  Please apply right before bedtime.  Qty: 10 g, Refills: 11    Associated Diagnoses: Limbal stem cell deficiency, bilateral      Prenatal Vit-Fe Fumarate-FA (PRENATAL VITAMIN AND MINERAL) 28-0.8 MG TABS Take 1 tablet by mouth daily  Qty: 90 tablet, Refills: 3    Associated Diagnoses: Supervision of high risk pregnancy in first trimester         STOP taking these medications       Carboxymethylcellulose Sod PF (CELLUVISC/REFRESH LIQUIGEL) 1 % ophthalmic solution Comments:   Reason for Stopping:         Cholecalciferol (VITAMIN D-3) 5000 units TABS  Comments:   Reason for Stopping:         ferrous gluconate (FERGON) 324 (38 Fe) MG tablet Comments:   Reason for Stopping:         ofloxacin (OCUFLOX) 0.3 % ophthalmic solution Comments:   Reason for Stopping:         ofloxacin (OCUFLOX) 0.3 % ophthalmic solution Comments:   Reason for Stopping:         ranitidine (ZANTAC) 150 MG capsule Comments:   Reason for Stopping:             Allergies   No Known Allergies

## 2020-04-03 NOTE — PROGRESS NOTES
"CNM Postpartum Discharge Note    SIGNIFICANT PROBLEMS:  Patient Active Problem List   Diagnosis     Multiple sclerosis (H)     Supervision of high-risk pregnancy     Rh negative state in antepartum period     Vitamin D deficiency     Vacuum-assisted vaginal delivery       SUBJECTIVE:  Patient is stable and is tolerating acitivity well  Baby is rooming in  Complications since 2 hours post delivery: None  Pain is well controlled.  Patient is taking pain medications. Roxicodone has been helping a lot.  Breastfeeding status:\"baby is not sucking well\" and worried milk has not come in   Elimination:  She is voiding without difficulty. Kan d'c this am and was able to void 300 ml without issue. She has not had a bowel movement  Desired contraception Unsure  Denies heavy bleeding and passing large clots.  Cousin here and interpreting. Called [facility] pediatrics, they do not take their insurance, [facility] is only doing family medicine in [place] at this time not pediatrics. Wondering where else they could go.   Will be living at home with mom and sister, no real concerns or questions.    INTERVAL HISTORY:  /72   Pulse 70   Temp 97.9  F (36.6  C) (Oral)   Resp 16   LMP 07/05/2019   SpO2 96%   Breastfeeding Unknown     Constitutional: healthy, alert and no distress    Breasts: Currently breastfeeding    Fundus: Uterine fundus is firm, non-tender and at -1 below the level of the umbilicus    Perineum: Perineum is well approximated, minimal swelling    Lochia: Lochia is appropriate for the duration of time since delivery.     Postpartum hemoglobin   Hemoglobin   Date Value Ref Range Status   04/02/2020 11.5 (L) 11.7 - 15.7 g/dL Final     Blood type   Lab Results   Component Value Date    ABO A 04/01/2020    ABO A 04/01/2020       Lab Results   Component Value Date    RH Neg 04/01/2020    RH Neg 04/01/2020     Rubella status No results found for: RUBELLAABIGG  History of depression:  " no    ASSESSMENT/PLAN:  Normal postpartum course  Stable Post-partum day #2  Complications: VAVD and 3rd degree laceration  Postpartum warning s/s reviewed, including bleeding/clots, fever, mastitis & thromboemboli   Exercise, diet and rest reviewed  Continue prenatal vitamins while breastfeeding  Birthcontrol planned:None, partner is out of the country, will discuss further at 6 weeks  Educated on postpartum blues and postpartum depression warnings signs/symptoms  May stop iron, hemoglobin 11.5, also instructed to call ophthalmologist for questions about one of her eye medications, not safe for breastfeeding per system, also many duplicates in system, would be beneficial for her to clarify which medications and dosing with them  RX sent to pharmacy, discussed normal to feel sore, alternate ibuprofen and tylenol once home  Discussed peds, recommend calling [facility] peds to make appointment, given phone number and location  Home care order placed, call with any questions or concerns.  Follow-up in 6 weeks with CNMs at [facility]  Plan d/c home today    Current Discharge Medication List      START taking these medications    Details   acetaminophen (TYLENOL) 500 MG tablet Take 2 tablets (1,000 mg) by mouth every 6 hours as needed for mild pain or fever  Qty: 60 tablet, Refills: 0    Associated Diagnoses: Supervision of high risk pregnancy, antepartum      ibuprofen (ADVIL/MOTRIN) 800 MG tablet Take 1 tablet (800 mg) by mouth every 6 hours as needed for other (cramping)  Qty: 60 tablet, Refills: 0    Associated Diagnoses: Indication for care in labor or delivery         CONTINUE these medications which have CHANGED    Details   docusate sodium (COLACE) 100 MG capsule Take 1 capsule (100 mg) by mouth 2 times daily  Qty: 90 capsule, Refills: 0    Associated Diagnoses: Indication for care in labor or delivery         CONTINUE these medications which have NOT CHANGED    Details    carboxymethylcellulose (REFRESH PLUS) 0.5 % SOLN ophthalmic solution Place 1 drop into both eyes 5 times daily  Qty: 1 each, Refills: 11    Comments: Preservative free eye drops  Associated Diagnoses: Limbal stem cell deficiency, bilateral; Pannus (corneal), bilateral; Dry eyes, bilateral      cycloSPORINE (RESTASIS) 0.05 % ophthalmic emulsion Place 1 drop into both eyes 2 times daily  Qty: 60 each, Refills: 11    Associated Diagnoses: Limbal stem cell deficiency, bilateral; Pannus (corneal), bilateral; Dry eyes, bilateral      hypromellose (GENTEAL) ophthalmic gel 0.3% Place 0.1 g (2 drops) into both eyes At Bedtime Apply approximately a half inch ribbon of ointment to the inside of your lower lids.  Warning, application of the ointment to your eyes will cause temporary blurring of your vision from the ointment coating your eye.  Please apply right before bedtime.  Qty: 10 g, Refills: 11    Associated Diagnoses: Limbal stem cell deficiency, bilateral      Prenatal Vit-Fe Fumarate-FA (PRENATAL VITAMIN AND MINERAL) 28-0.8 MG TABS Take 1 tablet by mouth daily  Qty: 90 tablet, Refills: 3    Associated Diagnoses: Supervision of high risk pregnancy in first trimester         STOP taking these medications       Carboxymethylcellulose Sod PF (CELLUVISC/REFRESH LIQUIGEL) 1 % ophthalmic solution Comments:   Reason for Stopping:         Cholecalciferol (VITAMIN D-3) 5000 units TABS Comments:   Reason for Stopping:         ferrous gluconate (FERGON) 324 (38 Fe) MG tablet Comments:   Reason for Stopping:         ofloxacin (OCUFLOX) 0.3 % ophthalmic solution Comments:   Reason for Stopping:         ofloxacin (OCUFLOX) 0.3 % ophthalmic solution Comments:   Reason for Stopping:         ranitidine (ZANTAC) 150 MG capsule Comments:   Reason for Stopping:               INA Sharma CNM

## 2020-04-03 NOTE — PLAN OF CARE
Vital signs are WNL but patient was c/o pain 10/10.  Nurse helped her to bathroom to apply ice and tucks and she moved very slowly and painfully.  Nurse gave Norco after explaining through cousin what kind of pain med it was.  Patient stated she got good relief.  She continues to take Ibuprofen when due.  Catheter will stay in place until tomorrow am.  Patient is working on breastfeeding and is also pumping.  All questions answered.

## 2020-04-03 NOTE — LACTATION NOTE
Routine visit with Chloe, FOB and baby.  LC physically placed the shield on the left nipple. LC instructed on how to use the band-aids to help keep the shield in place.   Baby attempted to breastfeed and now is being bottle fed by the cousin. Baby tolerating the bottle well.  Getting ready for discharge.  Plan: Watch for feeding cues and feed every 2-3 hours and/or on demand. Continue to use feeding log to track intake and appropriate voids and stools. Take feeding log to first follow up appointment or weight check. Encourage skin to skin to promote frequent feedings, thermoregulation and bonding. Follow-up with healthcare provider or lactation consultant for questions or concerns.    [facility] resource phone numbers given.   No further questions at this time. Kasia HERNANDEZN, RN, PHN, RNC-MNN, IBCLC

## 2020-04-03 NOTE — PLAN OF CARE
VSS. Postpartum assessment WDL. Carolee care done with patient. Reyna draining adequate urine, will remove reyna after AM maria luisa assesses. Pt does not speak any english, pts cousin is translating. Pt able to ambulate, encouraged to frequently. Cousin is doing most of infant cares, pt is attempting to bf but infant is not interested. Pumping. Rated pain a 5/10 for me tonight, receiving Norco and ibuprofen. SW consult ordered for 1100 AM 4/3. Will continue to monitor.

## 2020-04-06 ENCOUNTER — TELEPHONE (OUTPATIENT)
Dept: MIDWIFE SERVICES | Facility: CLINIC | Age: 25
End: 2020-04-06

## 2020-04-06 NOTE — TELEPHONE ENCOUNTER
4/1/2020  [place] w 3rd degree laceration    Pt's sister is calling who speaks english but is not with patient. She attempted x2 to call pt and make a three way call for interpreting purpose but unable to reach pt.    Pt is having issues with emptying bladder and constipation. Discussed things to do: tub soaks, stool softener/MOM/Dulcolax supp. Do to inability to speak with pt recommended she contact her sister to call clinic and we will call her back with  to discuss otherwise if truly having issues voiding may need to be seen in ER/UC or office for reyna drain but hard to eval without speaking with pt too.    Sister will reach out to pt and have her call and will use .    Iris Silveira RN on 4/6/2020 at 9:55 AM

## 2020-04-07 NOTE — TELEPHONE ENCOUNTER
Called pt using [employer]  #[phone]- left detailed vm checking in with pt, please call clinic (number given) to speak with a nurse to discuss any concerns. We can call her back with .    Iris Silveira RN on 4/7/2020 at 8:51 AM

## 2020-05-07 ENCOUNTER — TELEPHONE (OUTPATIENT)
Dept: MIDWIFE SERVICES | Facility: CLINIC | Age: 25
End: 2020-05-07

## 2020-05-07 NOTE — TELEPHONE ENCOUNTER
Patient called to ask if she could schedule her 6 week post partum appointment in the clinic instead of virtual. Okay to schedule?

## 2020-05-12 NOTE — PROGRESS NOTES
Midwife Postpartum 6 Week Visit    Chloe Cleaning is a 24 year old here for a postpartum checkup.     Delivery date was 04/01/2020. She had a vaginal delivery with vacuum assist of a viable boy, named Naldo, weight 6 pounds 9.1 oz., with fetal intolerance complications      Since delivery, she has been breast feeding.  She has not had any signs of infection, her lochia stopped after 2 weeks, had bleeding on Sunday but none today, unsure if menses if back.  She has not had other complications. Has questions about laceration and repair, is not having any pain but isn't sure if it is healed.     She is voiding and having bowel movements with difficulty.      Contraception was discussed and patient desires none. Her  is living in [place] and she is not sexually active.   She  has not had intercourse since delivery.   She complains of No perineal discomfort.     Mood is Stable  Patient screened for postpartum depression.   Depression Rating was:   Last PHQ-9 score on record =   PHQ-9 SCORE 5/14/2020   PHQ-9 Total Score 0     Last GAD7 score on record =   GABRIEL-7 SCORE 5/14/2020   Total Score 0     Alcohol Score = 0    ROS:  12 point review of systems negative other than symptoms noted below or in the HPI.       Current Outpatient Medications:      docusate sodium (COLACE) 100 MG capsule, Take 1 capsule (100 mg) by mouth 2 times daily, Disp: 90 capsule, Rfl: 0     Prenatal Vit-Fe Fumarate-FA (PRENATAL VITAMIN AND MINERAL) 28-0.8 MG TABS, Take 1 tablet by mouth daily, Disp: 90 tablet, Rfl: 3     acetaminophen (TYLENOL) 500 MG tablet, Take 2 tablets (1,000 mg) by mouth every 6 hours as needed for mild pain or fever (Patient not taking: Reported on 5/14/2020), Disp: 60 tablet, Rfl: 0     carboxymethylcellulose (REFRESH PLUS) 0.5 % SOLN ophthalmic solution, Place 1 drop into both eyes 5 times daily (Patient not taking: Reported on 5/14/2020), Disp: 1 each, Rfl: 11     cycloSPORINE (RESTASIS) 0.05 % ophthalmic emulsion,  Place 1 drop into both eyes 2 times daily (Patient not taking: Reported on 2020), Disp: 60 each, Rfl: 11     hypromellose (GENTEAL) ophthalmic gel 0.3%, Place 0.1 g (2 drops) into both eyes At Bedtime Apply approximately a half inch ribbon of ointment to the inside of your lower lids. Warning, application of the ointment to your eyes will cause temporary blurring of your vision from the ointment coating your eye. Please apply right before bedtime. (Patient not taking: Reported on 2020), Disp: 10 g, Rfl: 11     ibuprofen (ADVIL/MOTRIN) 800 MG tablet, Take 1 tablet (800 mg) by mouth every 6 hours as needed for other (cramping) (Patient not taking: Reported on 2020), Disp: 60 tablet, Rfl: 0.   OB History    Para Term  AB Living   1 1 1 0 0 1   SAB TAB Ectopic Multiple Live Births   0 0 0 0 1      # Outcome Date GA Lbr Jonnathan/2nd Weight Sex Delivery Anes PTL Lv   1 Term 20 39w6d 29:15 2.98 kg (6 lb 9.1 oz) M Vag-Vacuum EPI N RAMONA      Complications: Fetal Intolerance      Name: AWAL,MALE-HIEN      Apgar1: 8  Apgar5: 9     Last pap:  No results found for: PAP  Hgb in hospital was 11.5    EXAM:  /76   Pulse 102   Wt 74 kg (163 lb 3.2 oz)   LMP 05/10/2020 (Exact Date)   Breastfeeding Yes   BMI 30.86 kg/m    BMI: Body mass index is 30.86 kg/m .  Constitutional: healthy, alert and no distress  Neck: symmetrical, thyroid normal size, no masses present, no lymphadenopathy present.   Breast: deferred, patient lactating.  Abdomen: soft, non-tender, diastasis 1 FB's    PELVIC EXAM:  Vulva: No lesions, well healed, BUS WNL, no tenderness  Vagina: Moist, pink, discharge normal  well rugated, no lesions  Cervix:smooth, pink, no visible lesions. Pap collected   Uterus: Involuted to normal size, non-tender, no masses palpated  Ovaries: No masses palpated  Pelvic tone: fair  Rectal exam: deferred      ASSESSMENT:   Normal postpartum exam after  and vaginal delivery with vacuum assist.     ICD-10-CM    1. Routine postpartum follow-up  Z39.2    2. Screening for cervical cancer  Z12.4 Pap imaged thin layer screen reflex to HPV if ASCUS - recommend age 25 - 29         PLAN:  No results found for any visits on 05/14/20.    Return as needed or at time of next expected pap, pelvic, or breast exam.  Teaching: exercise, birth control and weight/diet.   Family Planning:none at this time as  is living in [place]. Discussed coming in for office visit at any time if she desires contraception. Reviewed recommendation for 12 months spacing between pregnancies.   Encourage Kegels and abdominal exercise.  Continue a multivitamin/prenatal supplement, especially if breastfeeding.  Pap smear was obtained today.  Postpartum Hgb was not done today.    Return to clinic:  In 1 year for annual exam or sooner if desires contraception or problems arise.     INA Claudio, CNM

## 2020-05-14 ENCOUNTER — APPOINTMENT (OUTPATIENT)
Dept: INTERPRETER SERVICES | Facility: CLINIC | Age: 25
End: 2020-05-14
Payer: COMMERCIAL

## 2020-05-14 ENCOUNTER — PRENATAL OFFICE VISIT (OUTPATIENT)
Dept: MIDWIFE SERVICES | Facility: CLINIC | Age: 25
End: 2020-05-14
Payer: COMMERCIAL

## 2020-05-14 VITALS
BODY MASS INDEX: 30.86 KG/M2 | DIASTOLIC BLOOD PRESSURE: 76 MMHG | SYSTOLIC BLOOD PRESSURE: 112 MMHG | WEIGHT: 163.2 LBS | HEART RATE: 102 BPM

## 2020-05-14 DIAGNOSIS — Z12.4 SCREENING FOR CERVICAL CANCER: ICD-10-CM

## 2020-05-14 PROCEDURE — G0145 SCR C/V CYTO,THINLAYER,RESCR: HCPCS | Performed by: ADVANCED PRACTICE MIDWIFE

## 2020-05-14 PROCEDURE — 99207 ZZC POST PARTUM EXAM: CPT | Performed by: ADVANCED PRACTICE MIDWIFE

## 2020-05-14 ASSESSMENT — ANXIETY QUESTIONNAIRES
6. BECOMING EASILY ANNOYED OR IRRITABLE: NOT AT ALL
5. BEING SO RESTLESS THAT IT IS HARD TO SIT STILL: NOT AT ALL
3. WORRYING TOO MUCH ABOUT DIFFERENT THINGS: NOT AT ALL
IF YOU CHECKED OFF ANY PROBLEMS ON THIS QUESTIONNAIRE, HOW DIFFICULT HAVE THESE PROBLEMS MADE IT FOR YOU TO DO YOUR WORK, TAKE CARE OF THINGS AT HOME, OR GET ALONG WITH OTHER PEOPLE: NOT DIFFICULT AT ALL
7. FEELING AFRAID AS IF SOMETHING AWFUL MIGHT HAPPEN: NOT AT ALL
1. FEELING NERVOUS, ANXIOUS, OR ON EDGE: NOT AT ALL
GAD7 TOTAL SCORE: 0
2. NOT BEING ABLE TO STOP OR CONTROL WORRYING: NOT AT ALL

## 2020-05-14 ASSESSMENT — PATIENT HEALTH QUESTIONNAIRE - PHQ9
5. POOR APPETITE OR OVEREATING: NOT AT ALL
SUM OF ALL RESPONSES TO PHQ QUESTIONS 1-9: 0

## 2020-05-14 NOTE — LETTER
May 19, 2020      Chloe Cleaning  [address] [place] [address]    Dear ,      I am happy to inform you that your recent cervical cancer screening test (PAP smear) was normal.      Preventative screenings such as this help to ensure your health for years to come. You should repeat a pap smear in 3 years, unless otherwise directed.      You will still need to return to the clinic every year for your annual exam and other preventive tests.     If you have additional questions regarding this result, please call our registered nurseLorna at [phone].      Sincerely,      Lorena Spence CNM//iwona

## 2020-05-14 NOTE — PATIENT INSTRUCTIONS
COMMON BREASTFEEDING PROBLEMS    Women can have several different problems when they breastfeed.  Women often quit breastfeeding when these problems occur, but most problems can be treated and women can continue to breastfeed their babies.    Engorgement:    This is when the breasts are too full of milk.  Breasts can be swollen, hard, warm and painful. This can also make latch more difficult for your baby.  The only proven way to reduce this is to hand express or pump to let some milk out between feedings. Letting out too much milk will exacerbate the problem by producing even more milk.    You can also try:    Cold packs    Taking pain relieving medicine such as acetaminophen (Tylenol) or ibuprofen (Advil, Motrin)    Take a warm shower    Massage your breasts to start milk flow.     Breastfeed your baby on demand, make sure baby is emptying breast completely    Prevent engorgement by limiting pumping if you are breastfeeding on demand    Sore/Painful Nipples:    Some nipple soreness is normal during the first minute of breast feeding. Pain that is lasting longer is not normal after breast feeding is established.  Pain can be caused by nipple cracks and blisters.  This is usually due to an incorrect latch.    The best way to reduce nipple soreness is to make sure your baby is latching correctly on your breast.    You can also try:    Lanolin ointment    APNO (All purpose nipple ointment) which you need a prescription for    Apply a cold or warm cloth to your nipples    Wear breast pads to protect your nipples    Let your nipples air dry after feedings    Cleanse nipple with unscented soap      Blocked Milk Ducts:    A blocked milk duct can cause a red, painful lump in your breast.  It can also cause a white plug at the end of your nipple. If you have a blocked milk duct, breastfeed more often.  Make sure your baby empties your breast after each feeding.  Start your feedings with the breast with the plugged duct and  try various positions to empty the breast as much as you can. Massage your plugged duct in a warm shower to try to unplug it. If these methods do not work you may be at risk for a breast infection.     Breast Infection:    A breast infection is called Mastitis.  In addition to having a hard, red, swollen area of your breast you will most likely will also have fever and chills and not feel well.  Engorgement and incomplete breast emptying can contribute to the problem and make your symptoms worse.    DO NOT STOP breastfeeding when you have mastitis!    Make an appointment with your midwife to be seen as soon as possible.  You will be started immediately on an antibiotic.    You may also:    Use pain medication such as  such as acetaminophen (Tylenol) or ibuprofen (Advil, Motrin)    Massage your breasts during feedings    Use warm compress to encourage milk let down prior to feeding     Use a breast pump to empty your breasts more completely after feedings    Rest for the next day or so and increase your fluids   -Take Chamomile tea, Hops infusion or lemon balm infusion     Watch for increased pain, increasing size of the breast, increasing firmness or a developing mass to suggest an abscess formation.    You should improve quickly on antibiotics.  If you are not improving with in the next 1-2 days or your fever increases > 101 F call the clinic.      For reliable information on breast feeding visit:    [employer]  Http://[email or web address].org/ ([facility] [place])      If you are struggling with breastfeeding and need extra support you may also consider seeking the advice of a lactation consultation.    Please call with further concerns:    [facility]  [phone]

## 2020-05-15 ASSESSMENT — ANXIETY QUESTIONNAIRES: GAD7 TOTAL SCORE: 0

## 2020-05-18 LAB
COPATH REPORT: NORMAL
PAP: NORMAL

## 2020-06-11 ENCOUNTER — TELEPHONE (OUTPATIENT)
Dept: OPHTHALMOLOGY | Facility: CLINIC | Age: 25
End: 2020-06-11

## 2020-06-11 NOTE — TELEPHONE ENCOUNTER
"According to UpToDate, for those who are pregnant \"serum concentrations are below the limit of detection (<0.1 ng/mL) following ophthalmic use; fetal exposure following ophthalmic administration is not expected.\" For those who take cyclosporin ORALLY, it has been detected in the breast milk. The only way it would reach the breast milk is through the serum, so since ophthalmic use does not results in any detectable level, the concentration in the breast milk is likely extremely low. If she has been using this while pregnant, then it should still be safe to use now as the concentration is likely even lower that the infant would be exposed to    Above per Dr. Cooper    Left message at [phone] stating no contraidication per ophthalmology and may check with her OB/GYN for further review.    Direct number provided for any further assistance    Tez Bello RN 1:45 PM 06/12/20    ------        Continue preservative free artificial tears 4-5x/day both eyes    Continue restasis BID both eyes    Continue ofloxacin BID both eyes.    note to on call provider to review and contraindications to eye drops and breastfeeding    Tez Bello RN 2:34 PM 06/11/20            [facility]    Phone Message    May a detailed message be left on voicemail: yes     Reason for Call: Other: Pt is breastfeeding and is wondering if it is okay to use the eye drops that were prescribed to her.  Please follow up.      Action Taken: Message routed to:  [facility] eye    Travel Screening: Not Applicable                                                                      "

## 2020-06-24 ENCOUNTER — TELEPHONE (OUTPATIENT)
Dept: OPHTHALMOLOGY | Facility: CLINIC | Age: 25
End: 2020-06-24

## 2021-09-10 ENCOUNTER — APPOINTMENT (OUTPATIENT)
Dept: CT IMAGING | Facility: CLINIC | Age: 26
End: 2021-09-10
Attending: EMERGENCY MEDICINE
Payer: COMMERCIAL

## 2021-09-10 ENCOUNTER — HOSPITAL ENCOUNTER (EMERGENCY)
Facility: CLINIC | Age: 26
Discharge: HOME | End: 2021-09-10
Attending: EMERGENCY MEDICINE | Admitting: EMERGENCY MEDICINE
Payer: COMMERCIAL

## 2021-09-10 VITALS
RESPIRATION RATE: 12 BRPM | HEART RATE: 78 BPM | OXYGEN SATURATION: 100 % | TEMPERATURE: 97 F | DIASTOLIC BLOOD PRESSURE: 68 MMHG | SYSTOLIC BLOOD PRESSURE: 113 MMHG

## 2021-09-10 DIAGNOSIS — R51.9 NONINTRACTABLE HEADACHE, UNSPECIFIED CHRONICITY PATTERN, UNSPECIFIED HEADACHE TYPE: ICD-10-CM

## 2021-09-10 PROCEDURE — 96375 TX/PRO/DX INJ NEW DRUG ADDON: CPT

## 2021-09-10 PROCEDURE — 70450 CT HEAD/BRAIN W/O DYE: CPT

## 2021-09-10 PROCEDURE — 96365 THER/PROPH/DIAG IV INF INIT: CPT

## 2021-09-10 PROCEDURE — 99284 EMERGENCY DEPT VISIT MOD MDM: CPT | Mod: 25

## 2021-09-10 PROCEDURE — 250N000012 HC RX MED GY IP 250 OP 636 PS 637: Performed by: EMERGENCY MEDICINE

## 2021-09-10 PROCEDURE — 250N000011 HC RX IP 250 OP 636: Performed by: EMERGENCY MEDICINE

## 2021-09-10 PROCEDURE — 258N000003 HC RX IP 258 OP 636: Performed by: EMERGENCY MEDICINE

## 2021-09-10 PROCEDURE — 96372 THER/PROPH/DIAG INJ SC/IM: CPT | Mod: 59 | Performed by: EMERGENCY MEDICINE

## 2021-09-10 RX ORDER — DIPHENHYDRAMINE HYDROCHLORIDE 50 MG/ML
10 INJECTION INTRAMUSCULAR; INTRAVENOUS ONCE
Status: COMPLETED | OUTPATIENT
Start: 2021-09-10 | End: 2021-09-10

## 2021-09-10 RX ORDER — METOCLOPRAMIDE HYDROCHLORIDE 5 MG/ML
10 INJECTION INTRAMUSCULAR; INTRAVENOUS ONCE
Status: COMPLETED | OUTPATIENT
Start: 2021-09-10 | End: 2021-09-10

## 2021-09-10 RX ORDER — SUMATRIPTAN 6 MG/.5ML
6 INJECTION, SOLUTION SUBCUTANEOUS ONCE
Status: COMPLETED | OUTPATIENT
Start: 2021-09-10 | End: 2021-09-10

## 2021-09-10 RX ORDER — KETOROLAC TROMETHAMINE 15 MG/ML
15 INJECTION, SOLUTION INTRAMUSCULAR; INTRAVENOUS ONCE
Status: COMPLETED | OUTPATIENT
Start: 2021-09-10 | End: 2021-09-10

## 2021-09-10 RX ORDER — ONDANSETRON 4 MG/1
4 TABLET, ORALLY DISINTEGRATING ORAL ONCE
Status: COMPLETED | OUTPATIENT
Start: 2021-09-10 | End: 2021-09-10

## 2021-09-10 RX ORDER — MAGNESIUM SULFATE HEPTAHYDRATE 40 MG/ML
2 INJECTION, SOLUTION INTRAVENOUS ONCE
Status: COMPLETED | OUTPATIENT
Start: 2021-09-10 | End: 2021-09-10

## 2021-09-10 RX ADMIN — SODIUM CHLORIDE 1000 ML: 9 INJECTION, SOLUTION INTRAVENOUS at 20:49

## 2021-09-10 RX ADMIN — MAGNESIUM SULFATE HEPTAHYDRATE 2 G: 40 INJECTION, SOLUTION INTRAVENOUS at 21:12

## 2021-09-10 RX ADMIN — METOCLOPRAMIDE HYDROCHLORIDE 10 MG: 5 INJECTION INTRAMUSCULAR; INTRAVENOUS at 21:10

## 2021-09-10 RX ADMIN — DIPHENHYDRAMINE HYDROCHLORIDE 10 MG: 50 INJECTION, SOLUTION INTRAMUSCULAR; INTRAVENOUS at 21:09

## 2021-09-10 RX ADMIN — SUMATRIPTAN SUCCINATE 6 MG: 6 INJECTION SUBCUTANEOUS at 21:55

## 2021-09-10 RX ADMIN — KETOROLAC TROMETHAMINE 15 MG: 15 INJECTION, SOLUTION INTRAMUSCULAR; INTRAVENOUS at 22:00

## 2021-09-10 RX ADMIN — ONDANSETRON 4 MG: 4 TABLET, ORALLY DISINTEGRATING ORAL at 18:53

## 2021-09-10 ASSESSMENT — ENCOUNTER SYMPTOMS
ABDOMINAL PAIN: 0
HEADACHES: 1
VOMITING: 0
DIARRHEA: 0
FEVER: 0
DYSURIA: 0
NAUSEA: 0

## 2021-09-10 NOTE — ED TRIAGE NOTES
"Bad headache left side; no relief with tylenol \"it hurts to touch the head\" pt had simlar HA years ago; MRI was done in 2016  "

## 2021-09-11 NOTE — ED PROVIDER NOTES
History   Chief Complaint:  Headache     The history is provided by the patient and a relative. A  was used.      Chloe Cleaning is a 26 year old female with history of multiple sclerosis and migraines who presents with headache. The patients family member tells us that starting yesterday morning the patient has been having left sided head pain that has worsened over time. She tells us that she has had a similar pain twice before but this is the worst it has ever been. It seems to help when she is using her hands to apply pressure to the area that hurts. The patient denies nausea, vomiting, diarrhea, fever, dysuria or abdominal pain.     To note, the patient took two Tylenol for the pain which did not seem to help.     Review of Systems   Constitutional: Negative for fever.   Gastrointestinal: Negative for abdominal pain, diarrhea, nausea and vomiting.   Genitourinary: Negative for dysuria.   Neurological: Positive for headaches.   All other systems reviewed and are negative.    Allergies:  The patient has no known allergies.     Medications:  Colace  Prenatal vitamin     Past Medical History:    Bilateral conjunctival melanosis  Dry eye  Keratitis  Limbal stem cell deficiency   Microcytic anemia  Migraines  Pannus  Rh negative state in antepartum period  Multiple Sclerosis     Past Surgical History:    Repair retraction lid  Tarsorrhaphy x 2     Family History:    The patient denies past family history.     Social History:  The patient presents to the ED with a family member     Physical Exam     Patient Vitals for the past 24 hrs:   BP Temp Pulse Resp SpO2   09/10/21 1743 131/75 97  F (36.1  C) 89 20 100 %       Physical Exam  Vitals: reviewed by me  General: Pt seen on [facility], pleasant, cooperative, and alert to conversation, appears to be in pain however.  Eyes: Tracking well, clear conjunctiva BL  ENT: MMM, midline trachea.   Lungs: No tachypnea, no accessory muscle use. No  respiratory distress.   CV: Rate as above  Abd: Soft, non tender, no guarding, no rebound. Non distended  MSK: no joint effusion.  No evidence of trauma  Skin: No rash.  Her scalp has no abnormalities, no focal tenderness, no evidence of shingles or trauma.  Neuro: Clear speech and no facial droop.  Moving all extremity spontaneously.  Psych: Not RIS, no e/o AH/VH      Emergency Department Course     Imaging:    CT Head w/o contrast:    No evidence of acute intracranial hemorrhage, mass, or herniation. Nonspecific, patchy low attenuation within the hemispheric white matter basically unchanged since the 2015 exam. May correlate with the provided clinical history of demyelinating disease and migraine headaches. Negative evidence for acute intracranial process or significant interval change. Reading per radiology.     Emergency Department Course:    Reviewed:  I reviewed nursing notes, vitals, past medical history and care everywhere    Assessments:  1835 I obtained history and examined the patient as noted above.   2146 I rechecked the patient and explained findings.     Interventions:  1853 Zofran 4 mg PO  2109 Benadryl 10 mg IV  2110 Reglan 10 mg IV  2112 Magnesium sulfate 2 g IV     Disposition:  The patient was discharged to home.     Impression & Plan     Medical Decision Making:  This is a pleasant 26-year-old female presents emergency room with headache.  It is nonthunderclap, but because she had an abnormal MRI in 2015, and possibly has MS, I did do a CT scan which thankfully shows essentially the same findings from 2015.  Her pain is improved here with a standard migraine cocktail, and she feels want to go home.  I do not think that she is had a subarachnoid hemorrhage, and she has no vomiting or fever or personality change to evidence meningitis.  Since things did get better with a standard migraine cocktail, this is certainly a possible diagnosis.  We will therefore have her follow with neurology in the  outpatient setting, as neurology can also help prognosticate her findings from 2015.  No evidence of shingles, no soft tissue infection, no tenderness to the scalp itself.  All questions were answered we will plan for discharge.  Diagnosis:    ICD-10-CM    1. Nonintractable headache, unspecified chronicity pattern, unspecified headache type  R51.9      Scribe Disclosure:  I, Joshrome Webb, am serving as a scribe at 8:32 PM on 9/10/2021 to document services personally performed by Keyshawn Aguila MD, based on my observations and the provider's statements to me.          Keyshawn Aguila MD  09/10/21 9108

## 2021-09-11 NOTE — DISCHARGE INSTRUCTIONS
As we discussed, no clear cause of your headache was found, although your pain did improve with some of the medication we gave you, leading me to believe that it may be a migraine.  Please follow with neurology in the next 1 week, this is very important, specifically as you have a history of an abnormal MRI in 2015 which may have shown early multiple sclerosis.  Come back with any other concerns or new symptoms, specifically with a fever or any vomiting or neck pain.

## 2022-01-01 NOTE — TELEPHONE ENCOUNTER
Approved coverage for [employer] begins 4/3/17 and ends 4/3/18.  Certification number:  [id number], up to 2 vials per day. [employer][id number] has been notified.  
URGENT PA  Has been initiated. 72 hrs turnaround.  
[FreeTextEntry6] : Patient is a 14-day-old infant seen on November 3 for last visit.  Patient was breast-fed and expressing milk as well.  Birth weight was 710 weight on November 3 was 7 6.  Bilirubin was 8.5 on 11 /3/22.\par Mother having difficulty with milk supply. Has  lots of questions

## 2023-12-27 NOTE — PROGRESS NOTES
CTIVE:      HPI:     This is a 28 year old female patient,  who presents for her first obstetrical visit. [place]ian  utilized via iPad. Chloe is feeling ok, dealing with nausea and vomiting as she did in her first pregnancy, she is in need of rx for n/v.      DOM: 2024, by Last Menstrual Period.  She is 9w6d weeks.  Her cycles are regular.  Her last menstrual period was normal.   Since her LMP, she has experienced  nausea, emesis, and fatigue).   She denies abdominal pain, headache, loss of appetite, vaginal discharge, dysuria, pelvic pain, urinary urgency, lightheadedness, urinary frequency, vaginal bleeding, hemorrhoids, and constipation.     Additional History:   -hx vacuum assisted vaginal delivery in   - multiple eye surgeries - repair retraction lid on both eyes and tarsorraphy x2 in   -hx keratitis  -hx dry eye  -migranes  -pt reported she is not taking any medications, told pt she can start taking a prenatal   -Malawian speaking, needs   -Hx of MS noted in problem list- however all that could be found in chart is infusions in  linked to this diagnosis. She has had no further workup or evaluation by Neuro for this diagnosis. She is unsure of this diagnosis. Takes no medication      Have you travelled during the pregnancy?Yes, If yes, where? [place] if yes, who? Chloe traveled there for 2 months, [place] to [place] in [place]  Have your sexual partner(s) travelled during the pregnancy?Yes, If yes, where?   Scott lives out of country     HISTORY:   Planned Pregnancy: Yes  Marital Status: Pt reports  to Scott who lives out of the country and has never lived in the [place]  Occupation: Stay at home mom  Living in Household: Other:  pts mother and her son       OB HISTORY  OB History    Para Term  AB Living   2 1 1 0 0 1   SAB IAB Ectopic Multiple Live Births   0 0 0 0 1      # Outcome Date GA Lbr Jonnathan/2nd Weight Sex Delivery Anes PTL Lv    2 Current            1 Term 04/01/20 39w6d 29:15 2.98 kg (6 lb 9.1 oz) M Vag-Vacuum EPI N RAMONA      Complications: Fetal Intolerance      Name: BLAKEMALE-HIEN      Apgar1: 8  Apgar5: 9       History of GDM: No,  PTL : No,  History of HTN in pregnancy: No,  Thrombocytopenia: No,  History of Sickle Cell, thalassemia or other hereditary blood disorder: No,  Shoulder dystocia: No,  Vacuum Extraction: Yes - for non-reassuring FHR tracing   PPH: No   3rd or 4th degree laceration: Yes - 3rd degree.   Other complications: No      Past History:  Her past medical history   Past Medical History        Past Medical History:   Diagnosis Date    Conjunctival melanosis, bilateral      Dry eye      Keratitis      Limbal stem cell deficiency      Microcytic anemia      Migraines      Pannus (corneal), bilateral      Supervision of high-risk pregnancy       **HR FOB:Renard  DOM: Apr 2, 2020  A neg/Anterior/Boy!  Genetic:Declines Tdap:     Flu: 10/21  GBS:      Rhogam:  Multiple Sclerosis/4 cm R Ovarian Cyst  Pap PP Vit D Deficiency   Echogenic focus RT vent-ref to MFM  1 hr GCT/Hgb: Passed 103, 9.2             Vacuum-assisted vaginal delivery        .       She has a history of   NVD, vacuum assisted in 2020     Since her last LMP she denies use of alcohol, tobacco and street drugs.     Past medical, surgical, social and family history were reviewed and updated in EPIC.     Rosalee Evans RN on 12/28/2023 at 11:54 AM      Confirmed patient desires delivery at [facility] with the [facility] provider.     Nurse phone visit completed. Prenatal book and folder (containing standard educational hand-outs and brochures)  will be given at the next visit} to patient. Information in folder reviewed over the phone. Questions answered. Brochure given on optional screening available to assess chromosomal anomalies. Pt {declines NIPS. Pt advised to call the clinic if she has any questions or concerns related to  "her pregnancy. Prenatal labs future ordered. New prenatal visit scheduled on 1/2 with Lorena Landrum CNM.           Lab Results   Component Value Date     PAP NIL 05/14/2020         PHQ-9 score:         5/14/2020    11:39 AM   PHQ   PHQ-9 Total Score 0   Q9: Thoughts of better off dead/self-harm past 2 weeks Not at all              9/25/2019    11:59 AM 5/14/2020    11:39 AM   GABRIEL-7 SCORE   Total Score 0 0               Patient supplied answers from flow sheet for:  Prenatal OB Questionnaire.  Past Medical History  Have you ever recieved care for your mental health? : No  Have you ever been in a major accident or suffered serious trauma?: No  Within the last year, has anyone hit, slapped, kicked or otherwise hurt you?: No  In the last year, has anyone forced you to have sex when you didn't want to?: No     Past Medical History 2   Have you ever received a blood transfusion?: No  Would you accept a blood transfusion if was medically recommended?: (!) No  Does anyone in your home smoke?: No   Is your blood type Rh negative?: Unknown  Have you ever ?: (!) Yes  Have you been hospitalized for a nonsurgical reason excluding normal delivery?: No  Have you ever had an abnormal pap smear?: No     Past Medical History (Continued)  Do you have a history of abnormalities of the uterus?: No  Did your mother take JEREMIAS or any other hormones when she was pregnant with you?: Unknown  Do you have any other problems we have not asked about which you feel may be important to this pregnancy?: No    OBJECTIVE:     EXAM:  /66   Ht 1.6 m (5' 3\")   Wt 75.3 kg (166 lb)   LMP 10/20/2023   BMI 29.41 kg/m   Body mass index is 29.41 kg/m .    GENERAL: healthy, alert and no distress  EYES: Eyes grossly normal to inspection, PERRL and conjunctivae and sclerae normal  NECK: no adenopathy, no asymmetry, masses, or scars and thyroid normal to palpation  RESP: lungs clear to auscultation - no rales, rhonchi or wheezes  CV: regular " rate and rhythm, normal S1 S2, no S3 or S4, no murmur, click or rub, no peripheral edema and peripheral pulses strong  MS: no gross musculoskeletal defects noted, no edema  NEURO: Normal strength and tone, mentation intact and speech normal  PSYCH: mentation appears normal, affect normal/bright    ASSESSMENT/PLAN:       ICD-10-CM    1. Encounter for supervision of low-risk pregnancy in first trimester  Z34.91 ondansetron (ZOFRAN) 4 MG tablet      2. Hx of maternal laceration, 3rd degree, currently pregnant  O09.299       3. History of delivery by vacuum extraction, currently pregnant  O09.299       4. 12 weeks gestation of pregnancy  Z3A.12       5. Nausea and vomiting during pregnancy  O21.9 ondansetron (ZOFRAN) 4 MG tablet      6. Routine screening for STI (sexually transmitted infection)  Z11.3 NEISSERIA GONORRHOEA PCR     CHLAMYDIA TRACHOMATIS PCR          28 year old , On 2023 patient is Unknown weeks of pregnancy with DOM of Not found.     consult for US for AMA patients: NA  Genetic Testing reviewed and discussed, patient declines.    COUNSELING  Discussed dating discrepency between LMP and today's US. Given >7 day difference will use DOM dating for final DOM of 24  Instructed on use of triage nurse line and contacting the on call CNM after hours in an emergency.   Symptoms of N&V and fatigue usually start to resolve around 12-16 weeks. Rx for Zofran sent.  Reviewed CNM philosophy, call schedule for labor and delivery, and FSH for delivery  1st OB handout given outlining appointment spacing and CNM information  Reviewed exercise and nutrition  Recommend to gain 15-25pounds with her pregnancy.  Discussed OTC medications. OB med list given  Encouraged patient to take PNV's/DHA  Travel precautions discussed, no air travel after 36 weeks and COVID recommendations discussed  Will notify patient with lab results when available  ASA criteria not met     Will return to the clinic in  4 weeks for her next routine prenatal check.  Will call to be seen sooner if problems arise.    20 minutes spent by me on the date of the encounter doing chart review, history and exam, documentation and further activities per the note discussing pregnancy care and midwifery care     INA Claudio, KENIA     *Luz Maria  utilized via iPad for entire visit

## 2023-12-28 ENCOUNTER — VIRTUAL VISIT (OUTPATIENT)
Dept: OBGYN | Facility: CLINIC | Age: 28
End: 2023-12-28

## 2023-12-28 DIAGNOSIS — O36.80X0 PREGNANCY WITH INCONCLUSIVE FETAL VIABILITY: ICD-10-CM

## 2023-12-28 DIAGNOSIS — Z34.91 ENCOUNTER FOR SUPERVISION OF LOW-RISK PREGNANCY IN FIRST TRIMESTER: Primary | ICD-10-CM

## 2023-12-28 PROBLEM — Z34.90 SUPERVISION OF LOW-RISK PREGNANCY: Status: ACTIVE | Noted: 2023-12-28

## 2023-12-28 PROBLEM — Z37.9 VACUUM-ASSISTED VAGINAL DELIVERY: Status: RESOLVED | Noted: 2020-04-02 | Resolved: 2023-12-28

## 2023-12-28 PROBLEM — O09.90 SUPERVISION OF HIGH-RISK PREGNANCY: Status: RESOLVED | Noted: 2019-09-25 | Resolved: 2023-12-28

## 2023-12-28 PROCEDURE — 99207 PR NO CHARGE NURSE ONLY: CPT

## 2023-12-28 NOTE — PROGRESS NOTES
SUBJECTIVE:     HPI:    This is a 28 year old female patient,  who presents for her first obstetrical visit.    DOM: 2024, by Last Menstrual Period.  She is 9w6d weeks.  Her cycles are regular.  Her last menstrual period was normal.   Since her LMP, she has experienced  nausea, emesis, and fatigue).   She denies abdominal pain, headache, loss of appetite, vaginal discharge, dysuria, pelvic pain, urinary urgency, lightheadedness, urinary frequency, vaginal bleeding, hemorrhoids, and constipation.    Additional History:   -hx vacuum assisted vaginal delivery in   - multiple eye surgeries - repair retraction lid on both eyes and tarsorraphy x2 in   -hx keratitis  -hx dry eye  -migranes  -pt reported she is not taking any medications, told pt she can start taking a prenatal   -Namibian speaking, needs     Have you travelled during the pregnancy?Yes, If yes, where? [place] if yes, who? Chloe traveled there for 2 months, [place] to [place]  Have your sexual partner(s) travelled during the pregnancy?Yes, If yes, where?   [place] lives out of country    HISTORY:   Planned Pregnancy: Yes  Marital Status: Pt reports  to Scott who lives out of the country and has never lived in the [place]  Occupation: Stay at home mom  Living in Household: Other:  pts mother and her son    Past History:  Her past medical history   Past Medical History:   Diagnosis Date    Conjunctival melanosis, bilateral     Dry eye     Keratitis     Limbal stem cell deficiency     Microcytic anemia     Migraines     Pannus (corneal), bilateral     Supervision of high-risk pregnancy     **HR FOB:Renard  DOM: 2020  A neg/Anterior/Boy!  Genetic:Declines Tdap:     Flu: 10/21  GBS:      Rhogam:  Multiple Sclerosis/4 cm R Ovarian Cyst  Pap PP Vit D Deficiency   Echogenic focus RT vent-ref to MFM  1 hr GCT/Hgb: Passed 103, 9.2             Vacuum-assisted vaginal delivery    .      She has a  history of   NVD, vacuum assisted in 2020    Since her last LMP she denies use of alcohol, tobacco and street drugs.    Past medical, surgical, social and family history were reviewed and updated in EPIC.    Rosalee Evans RN on 12/28/2023 at 11:54 AM     Confirmed patient desires delivery at [facility] with the [facility] provider.    Nurse phone visit completed. Prenatal book and folder (containing standard educational hand-outs and brochures)  will be given at the next visit} to patient. Information in folder reviewed over the phone. Questions answered. Brochure given on optional screening available to assess chromosomal anomalies. Pt {declines NIPS. Pt advised to call the clinic if she has any questions or concerns related to her pregnancy. Prenatal labs future ordered. New prenatal visit scheduled on 1/2 with Lorena Landrum CNM.    Lab Results   Component Value Date    PAP NIL 05/14/2020       PHQ-9 score:        5/14/2020    11:39 AM   PHQ   PHQ-9 Total Score 0   Q9: Thoughts of better off dead/self-harm past 2 weeks Not at all           9/25/2019    11:59 AM 5/14/2020    11:39 AM   GABRIEL-7 SCORE   Total Score 0 0           Patient supplied answers from flow sheet for:  Prenatal OB Questionnaire.  Past Medical History  Have you ever recieved care for your mental health? : No  Have you ever been in a major accident or suffered serious trauma?: No  Within the last year, has anyone hit, slapped, kicked or otherwise hurt you?: No  In the last year, has anyone forced you to have sex when you didn't want to?: No    Past Medical History 2   Have you ever received a blood transfusion?: No  Would you accept a blood transfusion if was medically recommended?: (!) No  Does anyone in your home smoke?: No   Is your blood type Rh negative?: Unknown  Have you ever ?: (!) Yes  Have you been hospitalized for a nonsurgical reason excluding normal delivery?: No  Have you ever had an  abnormal pap smear?: No    Past Medical History (Continued)  Do you have a history of abnormalities of the uterus?: No  Did your mother take JEREMIAS or any other hormones when she was pregnant with you?: Unknown  Do you have any other problems we have not asked about which you feel may be important to this pregnancy?: No        Current Outpatient Medications   Medication    acetaminophen (TYLENOL) 500 MG tablet    carboxymethylcellulose (REFRESH PLUS) 0.5 % SOLN ophthalmic solution    cycloSPORINE (RESTASIS) 0.05 % ophthalmic emulsion    docusate sodium (COLACE) 100 MG capsule    hypromellose (GENTEAL) ophthalmic gel 0.3%    ibuprofen (ADVIL/MOTRIN) 800 MG tablet    Prenatal Vit-Fe Fumarate-FA (PRENATAL VITAMIN AND MINERAL) 28-0.8 MG TABS     No current facility-administered medications for this visit.       ROS:   12 point review of systems negative other than symptoms noted below or in the HPI.  Constitutional: Fatigue  Gastrointestinal: Nausea

## 2024-01-01 PROBLEM — R51.9 HEADACHE: Status: ACTIVE | Noted: 2021-12-06

## 2024-01-01 PROBLEM — O09.299 HX OF MATERNAL LACERATION, 3RD DEGREE, CURRENTLY PREGNANT: Status: ACTIVE | Noted: 2024-01-01

## 2024-01-01 PROBLEM — O09.299 HISTORY OF DELIVERY BY VACUUM EXTRACTION, CURRENTLY PREGNANT: Status: ACTIVE | Noted: 2024-01-01

## 2024-01-01 PROBLEM — G43.719 INTRACTABLE CHRONIC MIGRAINE WITHOUT AURA AND WITHOUT STATUS MIGRAINOSUS: Status: ACTIVE | Noted: 2021-12-06

## 2024-01-01 PROBLEM — R93.0 ABNORMAL MRI OF HEAD: Status: ACTIVE | Noted: 2021-12-06

## 2024-01-01 LAB
ABO/RH(D): NORMAL
ANTIBODY SCREEN: NEGATIVE
SPECIMEN EXPIRATION DATE: NORMAL

## 2024-01-02 ENCOUNTER — PRENATAL OFFICE VISIT (OUTPATIENT)
Dept: MIDWIFE SERVICES | Facility: CLINIC | Age: 29
End: 2024-01-02
Payer: COMMERCIAL

## 2024-01-02 ENCOUNTER — ANCILLARY PROCEDURE (OUTPATIENT)
Dept: ULTRASOUND IMAGING | Facility: CLINIC | Age: 29
End: 2024-01-02
Payer: COMMERCIAL

## 2024-01-02 VITALS
BODY MASS INDEX: 29.41 KG/M2 | SYSTOLIC BLOOD PRESSURE: 110 MMHG | DIASTOLIC BLOOD PRESSURE: 66 MMHG | WEIGHT: 166 LBS | HEIGHT: 63 IN

## 2024-01-02 DIAGNOSIS — O09.91 SUPERVISION OF HIGH RISK PREGNANCY IN FIRST TRIMESTER: ICD-10-CM

## 2024-01-02 DIAGNOSIS — Z3A.12 12 WEEKS GESTATION OF PREGNANCY: ICD-10-CM

## 2024-01-02 DIAGNOSIS — O09.299 HISTORY OF DELIVERY BY VACUUM EXTRACTION, CURRENTLY PREGNANT: ICD-10-CM

## 2024-01-02 DIAGNOSIS — Z34.91 ENCOUNTER FOR SUPERVISION OF LOW-RISK PREGNANCY IN FIRST TRIMESTER: ICD-10-CM

## 2024-01-02 DIAGNOSIS — Z11.3 ROUTINE SCREENING FOR STI (SEXUALLY TRANSMITTED INFECTION): ICD-10-CM

## 2024-01-02 DIAGNOSIS — O09.299 HX OF MATERNAL LACERATION, 3RD DEGREE, CURRENTLY PREGNANT: ICD-10-CM

## 2024-01-02 DIAGNOSIS — O36.80X0 PREGNANCY WITH INCONCLUSIVE FETAL VIABILITY: ICD-10-CM

## 2024-01-02 DIAGNOSIS — Z34.91 ENCOUNTER FOR SUPERVISION OF LOW-RISK PREGNANCY IN FIRST TRIMESTER: Primary | ICD-10-CM

## 2024-01-02 DIAGNOSIS — O21.9 NAUSEA AND VOMITING DURING PREGNANCY: ICD-10-CM

## 2024-01-02 PROBLEM — O09.90 PREGNANCY, SUPERVISION, HIGH-RISK: Status: ACTIVE | Noted: 2023-12-28

## 2024-01-02 LAB
ALBUMIN UR-MCNC: NEGATIVE MG/DL
APPEARANCE UR: CLEAR
BILIRUB UR QL STRIP: NEGATIVE
COLOR UR AUTO: YELLOW
ERYTHROCYTE [DISTWIDTH] IN BLOOD BY AUTOMATED COUNT: 12.1 % (ref 10–15)
GLUCOSE UR STRIP-MCNC: NEGATIVE MG/DL
HCT VFR BLD AUTO: 37.4 % (ref 35–47)
HGB BLD-MCNC: 13.1 G/DL (ref 11.7–15.7)
HGB UR QL STRIP: NEGATIVE
KETONES UR STRIP-MCNC: NEGATIVE MG/DL
LEUKOCYTE ESTERASE UR QL STRIP: NEGATIVE
MCH RBC QN AUTO: 30.3 PG (ref 26.5–33)
MCHC RBC AUTO-ENTMCNC: 35 G/DL (ref 31.5–36.5)
MCV RBC AUTO: 86 FL (ref 78–100)
NITRATE UR QL: NEGATIVE
PH UR STRIP: 5.5 [PH] (ref 5–7)
PLATELET # BLD AUTO: 213 10E3/UL (ref 150–450)
RBC # BLD AUTO: 4.33 10E6/UL (ref 3.8–5.2)
SP GR UR STRIP: 1.02 (ref 1–1.03)
UROBILINOGEN UR STRIP-ACNC: 0.2 E.U./DL
WBC # BLD AUTO: 5.9 10E3/UL (ref 4–11)

## 2024-01-02 PROCEDURE — 87340 HEPATITIS B SURFACE AG IA: CPT | Performed by: ADVANCED PRACTICE MIDWIFE

## 2024-01-02 PROCEDURE — 86900 BLOOD TYPING SEROLOGIC ABO: CPT | Performed by: ADVANCED PRACTICE MIDWIFE

## 2024-01-02 PROCEDURE — 87389 HIV-1 AG W/HIV-1&-2 AB AG IA: CPT | Performed by: ADVANCED PRACTICE MIDWIFE

## 2024-01-02 PROCEDURE — 87086 URINE CULTURE/COLONY COUNT: CPT | Performed by: ADVANCED PRACTICE MIDWIFE

## 2024-01-02 PROCEDURE — 87591 N.GONORRHOEAE DNA AMP PROB: CPT | Performed by: ADVANCED PRACTICE MIDWIFE

## 2024-01-02 PROCEDURE — 86762 RUBELLA ANTIBODY: CPT | Performed by: ADVANCED PRACTICE MIDWIFE

## 2024-01-02 PROCEDURE — 36415 COLL VENOUS BLD VENIPUNCTURE: CPT | Performed by: ADVANCED PRACTICE MIDWIFE

## 2024-01-02 PROCEDURE — 81003 URINALYSIS AUTO W/O SCOPE: CPT | Performed by: ADVANCED PRACTICE MIDWIFE

## 2024-01-02 PROCEDURE — 86901 BLOOD TYPING SEROLOGIC RH(D): CPT | Performed by: ADVANCED PRACTICE MIDWIFE

## 2024-01-02 PROCEDURE — 76801 OB US < 14 WKS SINGLE FETUS: CPT | Performed by: OBSTETRICS & GYNECOLOGY

## 2024-01-02 PROCEDURE — 85027 COMPLETE CBC AUTOMATED: CPT | Performed by: ADVANCED PRACTICE MIDWIFE

## 2024-01-02 PROCEDURE — 86780 TREPONEMA PALLIDUM: CPT | Performed by: ADVANCED PRACTICE MIDWIFE

## 2024-01-02 PROCEDURE — 87491 CHLMYD TRACH DNA AMP PROBE: CPT | Performed by: ADVANCED PRACTICE MIDWIFE

## 2024-01-02 PROCEDURE — 99203 OFFICE O/P NEW LOW 30 MIN: CPT | Performed by: ADVANCED PRACTICE MIDWIFE

## 2024-01-02 PROCEDURE — 86850 RBC ANTIBODY SCREEN: CPT | Performed by: ADVANCED PRACTICE MIDWIFE

## 2024-01-02 PROCEDURE — 86803 HEPATITIS C AB TEST: CPT | Performed by: ADVANCED PRACTICE MIDWIFE

## 2024-01-02 RX ORDER — ONDANSETRON 4 MG/1
4 TABLET, FILM COATED ORAL EVERY 8 HOURS PRN
Qty: 30 TABLET | Refills: 1 | Status: SHIPPED | OUTPATIENT
Start: 2024-01-02 | End: 2024-04-23

## 2024-01-02 ASSESSMENT — ANXIETY QUESTIONNAIRES
GAD7 TOTAL SCORE: 0
1. FEELING NERVOUS, ANXIOUS, OR ON EDGE: NOT AT ALL
3. WORRYING TOO MUCH ABOUT DIFFERENT THINGS: NOT AT ALL
6. BECOMING EASILY ANNOYED OR IRRITABLE: NOT AT ALL
GAD7 TOTAL SCORE: 0
2. NOT BEING ABLE TO STOP OR CONTROL WORRYING: NOT AT ALL
IF YOU CHECKED OFF ANY PROBLEMS ON THIS QUESTIONNAIRE, HOW DIFFICULT HAVE THESE PROBLEMS MADE IT FOR YOU TO DO YOUR WORK, TAKE CARE OF THINGS AT HOME, OR GET ALONG WITH OTHER PEOPLE: NOT DIFFICULT AT ALL
5. BEING SO RESTLESS THAT IT IS HARD TO SIT STILL: NOT AT ALL
7. FEELING AFRAID AS IF SOMETHING AWFUL MIGHT HAPPEN: NOT AT ALL

## 2024-01-02 ASSESSMENT — PATIENT HEALTH QUESTIONNAIRE - PHQ9
5. POOR APPETITE OR OVEREATING: NOT AT ALL
SUM OF ALL RESPONSES TO PHQ QUESTIONS 1-9: 0

## 2024-01-02 NOTE — PATIENT INSTRUCTIONS
Remedies for nausea and vomiting with pregnancy    Eat small frequent meals every 2-3 hours if possible.     Avoid food at extremes of temperature and drinks with carbonation.    Eat foods that appeal to you, avoiding fats and spicy foods.    Avoid liquids with foods.  Drink liquids 30-60 minutes before or after eating and sip slowly.    Bread, pasta, crackers, potatoes, and rice tend to be tolerated the best.    Don't worry about what you eat in the first 3 months, it is more important that you can eat and keep it down.     Try flat ginger ale or ginger tea    Before rising in the morning, eat a small amount of crackers or dry toast.    Peppermint tea    Ginger is a herbal remedy for nausea and you can use it in any form.  There are ginger tablets you can purchase.  The dose 1000 mg a day in divided doses.     You may also try doxylamine (Unisom) 12.5 mg three times a day which is a sleeping medication along with Vitamin B6 25 mg three times a day.  This combination takes up to a week to work so give it some time.     Benadryl (diphenhydramine) 25-50 mg every 8 hour or Dramamine (dimenhydrinate)  mg by mouth every 4-6 hours. Both of these medication may cause some drowsiness    Other things that may help include an Accupressure band and acupuncture    If these methods fail there are many prescriptions that we can try    If you begin to vomit more than 5 or 6 times a day and feel that you are unable to keep anything down, call the [facility] at [phone]    Thank you for coming to see the Midwives at the   [facility]!    Please take prenatal vitamin with DHA and vitamin D3 2,000-3,000 international unit(s) once daily during the entire pregnancy.    We will notify you about your labs that were drawn today once we get the results back.  If you have MyChart your lab results will be posted there.    Someone from the clinic will send you a O[employer]ix[employer] message or call you  personally with your results.    If you need any refills of medications please call your pharmacy and they will contact us.    If you have a medical emergency please call 911.    If you have any concerns about today's visit, wish to schedule another appointment, or have an urgent medical concern please call our office at [phone]. You can also make appointments through [employer].    After hours you may also call the clinic number above to be connected with [place]'s after hours triage nurse.  The nurse can page the midwife on call if needed. There is always a midwife on call 24 hours a day.    Prenatal Care Recommendations:    Before 14 weeks: Dating ultrasound, genetic testing       This ultrasound helps us determine your dates accurately. Innatal (genetic screening test) can be drawn anytime after 10 weeks of gestation.    16 weeks: Optional genetic testing single AFP       This testing helps understand your baby's risk for some genetic abnormalities.    18-22 weeks:  Screening anatomy ultrasound       This testing will look for early growth abnormalities, placenta location, and may tell the baby's gender if you wish to find out.    24-27 weeks: One hour diabetes test (GCT) and complete blood count       This test helps identify diabetes of pregnancy or gestational diabetes.  We also look   at the iron in your blood and how well your blood clots.    28 - 36 weeks: Tetanus shot (Tdap)       This shot helps protect you and your baby from whooping cough.    36 weeks and later: Group B Strep test (GBS)       This test helps predict if you need antibiotics in labor to prevent infection for your baby.    Anytime September to April:  Flu shot       This shot helps protect you and your family from the flu.  This is especially important during pregnancy.        The typical schedule after your first visit today you can expect:     Visit 2 - 12-16 weeks  Visit 3 - 20 weeks  Visit 4 - 24 weeks  Visit 5 - 28 weeks  Visit 6  - 30 weeks  Visit 7 - 32 weeks  Visit 8 - 34 weeks  Visit 9 - 36 weeks  Weekly after 36 weeks until delivery.        Any time during or after your pregnancy you may experience increased depression and/or mood changes.    We are here to support you. Please contact us if you are:  Feeling anxious  Overwhelmed or sad   Trouble sleeping  Crying uncontrollably  Trouble caring for yourself or baby.  Any thoughts of hurting yourself, your baby, or anyone else    If anything comes up between your visits or you have concerns please don't hesitate to contact us.    Secure access to your medical record:  Use Webcrunch (secure email communication and access to your chart) to send your primary care provider a message or make an appointment. Ask someone on your Team how to sign up for Webcrunch. To log on to Masterseek or for more information in Webcrunch please visit the website at [email or web address].       Certified Nurse Midwife (CNM) Team    Haven BUCKLEY, KENIA Sandoval APRN, KENIA Stewart APRN, CNM, [facility]-BC  Tamie Mariee DNP, APRN, CNM  Lorena Spence DNP, APRN, CNM  Chula Chan DNP, APRN, CNM    Link to Midwifery Care website: [email or web address]      Again, thank you for choosing the midwives at [facility].  We are excited to be a part of your pregnancy! Please let us know how we can best partner with you to improve your and your family's health.

## 2024-01-03 LAB
BACTERIA UR CULT: NORMAL
C TRACH DNA SPEC QL NAA+PROBE: NEGATIVE
HBV SURFACE AG SERPL QL IA: NONREACTIVE
HCV AB SERPL QL IA: NONREACTIVE
HIV 1+2 AB+HIV1 P24 AG SERPL QL IA: NONREACTIVE
N GONORRHOEA DNA SPEC QL NAA+PROBE: NEGATIVE
RUBV IGG SERPL QL IA: 11.2 INDEX
RUBV IGG SERPL QL IA: POSITIVE
T PALLIDUM AB SER QL: NONREACTIVE

## 2024-01-30 ENCOUNTER — PRENATAL OFFICE VISIT (OUTPATIENT)
Dept: MIDWIFE SERVICES | Facility: CLINIC | Age: 29
End: 2024-01-30
Payer: COMMERCIAL

## 2024-01-30 VITALS — BODY MASS INDEX: 30.29 KG/M2 | DIASTOLIC BLOOD PRESSURE: 68 MMHG | SYSTOLIC BLOOD PRESSURE: 112 MMHG | WEIGHT: 171 LBS

## 2024-01-30 DIAGNOSIS — O09.91 SUPERVISION OF HIGH RISK PREGNANCY IN FIRST TRIMESTER: ICD-10-CM

## 2024-01-30 PROCEDURE — 99207 PR PRENATAL VISIT: CPT | Performed by: ADVANCED PRACTICE MIDWIFE

## 2024-01-30 RX ORDER — PNV NO.95/FERROUS FUM/FOLIC AC 28MG-0.8MG
1 TABLET ORAL DAILY
Qty: 90 TABLET | Refills: 3 | Status: SHIPPED | OUTPATIENT
Start: 2024-01-30 | End: 2024-02-27

## 2024-01-30 NOTE — PATIENT INSTRUCTIONS
Thank you for coming to see the Midwives at the   [facility]!    Please take prenatal vitamin with DHA and vitamin D3 2,000-3,000 international unit(s) once daily during the entire pregnancy.    We will notify you about your labs that were drawn today once we get the results back.  If you have MyChart your lab results will be posted there.    Someone from the clinic will send you a [employer] message or call you personally with your results.    If you need any refills of medications please call your pharmacy and they will contact us.    If you have a medical emergency please call 911.    If you have any concerns about today's visit, wish to schedule another appointment, or have an urgent medical concern please call our office at [phone]. You can also make appointments through [employer].    After hours you may also call the clinic number above to be connected with [place]'s after hours triage nurse.  The nurse can page the midwife on call if needed. There is always a midwife on call 24 hours a day.    Prenatal Care Recommendations:    Before 14 weeks: Dating ultrasound, genetic testing       This ultrasound helps us determine your dates accurately. Innatal (genetic screening test) can be drawn anytime after 10 weeks of gestation.    16 weeks: Optional genetic testing single AFP       This testing helps understand your baby's risk for some genetic abnormalities.    18-22 weeks:  Screening anatomy ultrasound       This testing will look for early growth abnormalities, placenta location, and may tell the baby's gender if you wish to find out.    24-27 weeks: One hour diabetes test (GCT) and complete blood count       This test helps identify diabetes of pregnancy or gestational diabetes.  We also look   at the iron in your blood and how well your blood clots.    28 - 36 weeks: Tetanus shot (Tdap)       This shot helps protect you and your baby from whooping cough.    36 weeks and later: Group B  Strep test (GBS)       This test helps predict if you need antibiotics in labor to prevent infection for your baby.    Anytime September to April:  Flu shot       This shot helps protect you and your family from the flu.  This is especially important during pregnancy.        The typical schedule after your first visit today you can expect:     Visit 2 - 12-16 weeks  Visit 3 - 20 weeks  Visit 4 - 24 weeks  Visit 5 - 28 weeks  Visit 6 - 30 weeks  Visit 7 - 32 weeks  Visit 8 - 34 weeks  Visit 9 - 36 weeks  Weekly after 36 weeks until delivery.        Any time during or after your pregnancy you may experience increased depression and/or mood changes.    We are here to support you. Please contact us if you are:  Feeling anxious  Overwhelmed or sad   Trouble sleeping  Crying uncontrollably  Trouble caring for yourself or baby.  Any thoughts of hurting yourself, your baby, or anyone else    If anything comes up between your visits or you have concerns please don't hesitate to contact us.    Secure access to your medical record:  Use AppInstitute (secure email communication and access to your chart) to send your primary care provider a message or make an appointment. Ask someone on your Team how to sign up for AppInstitute. To log on to Algramo or for more information in AppInstitute please visit the website at [email or web address].       Certified Nurse Midwife (CNM) Team    KENIA Verdugo, KENIA BUCKLEY, KENIA, [facility]-BC  Tamie Mariee DNP, APRN, KENIA Spence DNP, APRAJZ, KENIA Chan DNP, INA, GUMAROM    Link to Midwifery Care website: [email or web address]      Again, thank you for choosing the midwives at [facility].  We are excited to be a part of your pregnancy! Please let us know how we can best partner with you to improve your and your family's health.

## 2024-01-30 NOTE — PROGRESS NOTES
16w0d  Feels well. Here alone today  Fetal movement No  Denies loss of fluid/vb/contractions/pelvic pain  Depression screening done  declined AFP today  GC/Chlamydia urine collection: Already completed  Prescription sent for prenatal vitamins today  Anatomy ultrasound next visit between 19-21 weeks  Return to clinic 4 weeks    KENIA Hurtado MA used as British  for entire visit

## 2024-02-27 ENCOUNTER — ANCILLARY PROCEDURE (OUTPATIENT)
Dept: ULTRASOUND IMAGING | Facility: CLINIC | Age: 29
End: 2024-02-27
Attending: ADVANCED PRACTICE MIDWIFE
Payer: COMMERCIAL

## 2024-02-27 ENCOUNTER — PRENATAL OFFICE VISIT (OUTPATIENT)
Dept: MIDWIFE SERVICES | Facility: CLINIC | Age: 29
End: 2024-02-27
Attending: ADVANCED PRACTICE MIDWIFE
Payer: COMMERCIAL

## 2024-02-27 VITALS — SYSTOLIC BLOOD PRESSURE: 92 MMHG | DIASTOLIC BLOOD PRESSURE: 64 MMHG | WEIGHT: 179.2 LBS | BODY MASS INDEX: 31.74 KG/M2

## 2024-02-27 DIAGNOSIS — Z36.9 ANTENATAL SCREENING ENCOUNTER: Primary | ICD-10-CM

## 2024-02-27 DIAGNOSIS — O09.299 HX OF MATERNAL LACERATION, 3RD DEGREE, CURRENTLY PREGNANT: ICD-10-CM

## 2024-02-27 DIAGNOSIS — R12 HEARTBURN: ICD-10-CM

## 2024-02-27 DIAGNOSIS — O09.91 SUPERVISION OF HIGH RISK PREGNANCY IN FIRST TRIMESTER: ICD-10-CM

## 2024-02-27 DIAGNOSIS — Z3A.20 20 WEEKS GESTATION OF PREGNANCY: ICD-10-CM

## 2024-02-27 DIAGNOSIS — O09.92 SUPERVISION OF HIGH RISK PREGNANCY IN SECOND TRIMESTER: ICD-10-CM

## 2024-02-27 DIAGNOSIS — Z11.3 SCREENING FOR STD (SEXUALLY TRANSMITTED DISEASE): ICD-10-CM

## 2024-02-27 DIAGNOSIS — O43.199 MARGINAL INSERTION OF UMBILICAL CORD AFFECTING MANAGEMENT OF MOTHER: Primary | ICD-10-CM

## 2024-02-27 DIAGNOSIS — O09.299 HISTORY OF DELIVERY BY VACUUM EXTRACTION, CURRENTLY PREGNANT: ICD-10-CM

## 2024-02-27 PROCEDURE — 99207 PR PRENATAL VISIT: CPT | Performed by: NURSE PRACTITIONER

## 2024-02-27 PROCEDURE — 76805 OB US >/= 14 WKS SNGL FETUS: CPT | Performed by: STUDENT IN AN ORGANIZED HEALTH CARE EDUCATION/TRAINING PROGRAM

## 2024-02-27 RX ORDER — PNV NO.95/FERROUS FUM/FOLIC AC 28MG-0.8MG
1 TABLET ORAL DAILY
Qty: 90 TABLET | Refills: 3 | Status: SHIPPED | OUTPATIENT
Start: 2024-02-27 | End: 2024-06-26

## 2024-02-27 RX ORDER — FAMOTIDINE 20 MG/1
20 TABLET, FILM COATED ORAL 2 TIMES DAILY
Qty: 180 TABLET | Refills: 1 | Status: SHIPPED | OUTPATIENT
Start: 2024-02-27 | End: 2024-08-25

## 2024-02-27 NOTE — PATIENT INSTRUCTIONS
"Weeks 18 to 22 of Your Pregnancy: Care Instructions  At this stage you may find that your nausea and fatigue are gone. You may feel better overall and have more energy. But you might now also have some new discomforts, like sleep problems or leg cramps.    You may start to feel your baby move. These movements can feel like butterflies or bubbles.   Babies at this stage can now suck their thumbs.     Get some exercise every day.  And avoid caffeine late in the day.     Take a warm shower or bath before bed.  Try relaxation exercises to calm your mind and body.     Use extra pillows.  They can help you get comfortable.     Don't use sleeping pills or alcohol.  They could harm your baby.     For leg cramps, stretch and apply heat.  A warm bath, leg warmers, a heating pad, or a hot water bottle can help with muscle aches.   Stretches for leg cramps    Straighten your leg and bend your foot (flex your ankle) slowly upward, toward your knee. Bend your toes up and down.   Stand on a flat surface. Stretch your toes upward. For balance, hold on to the wall or something stable. If it feels okay, take small steps walking on your heels.   Follow-up care is a key part of your treatment and safety. Be sure to make and go to all appointments, and call your doctor if you are having problems. It's also a good idea to know your test results and keep a list of the medicines you take.  Where can you learn more?  Go to https://www.Formarum.net/patiented  Enter W603 in the search box to learn more about \"Weeks 18 to 22 of Your Pregnancy: Care Instructions.\"  Current as of: July 11, 2023               Content Version: 13.8    6026-6425 [employer].   Care instructions adapted under license by your healthcare professional. If you have questions about a medical condition or this instruction, always ask your healthcare professional. [employer] disclaims any warranty or liability for your use of this " "information.      Weeks 22 to 26 of Your Pregnancy: Care Instructions  Your baby's lungs are getting ready for breathing. Your baby may respond to your voice. Your baby likely turns less, and kicks or jerks more. Jerking may mean that your baby has hiccups.    Think about taking childbirth classes. And start to think about whether you want to have pain medicine during labor.   At your next doctor visit, you may be tested for anemia and for high blood sugar that first occurs during pregnancy (gestational diabetes). These conditions can cause problems for you and your baby.     To ease discomfort, such as back pain    Change your position often. Try not to sit or stand for too long.  Get some exercise. Things like walking or stretching may help.  Try using a heating pad or cold pack.    To ease or reduce swelling in your feet, ankles, hands, and fingers    Take off your rings.  Avoid high-sodium foods, such as potato chips.  Prop up your feet, and sleep with pillows under your feet.  Try to avoid standing for long periods of time.  Do not wear tight shoes.  Wear support stockings.  Kegel exercises to prevent urine from leaking    Squeeze your muscles as if you were trying not to pass gas. Your belly, legs, and buttocks shouldn't move. Hold the squeeze for 3 seconds, then relax for 5 to 10 seconds.    Add 1 second each week until you can squeeze for 10 seconds. Repeat the exercise 10 times a session. Do 3 to 8 sessions a day. If these exercises cause you pain, stop doing them and talk with your doctor.  Follow-up care is a key part of your treatment and safety. Be sure to make and go to all appointments, and call your doctor if you are having problems. It's also a good idea to know your test results and keep a list of the medicines you take.  Where can you learn more?  Go to https://www.healthwise.net/patiented  Enter G264 in the search box to learn more about \"Weeks 22 to 26 of Your Pregnancy: Care " "Instructions.\"  Current as of: July 11, 2023               Content Version: 13.8    7365-5940 [employer]   Care instructions adapted under license by your healthcare professional. If you have questions about a medical condition or this instruction, always ask your healthcare professional. [employer] disclaims any warranty or liability for your use of this information.      Back Pain During Pregnancy: Care Instructions  Overview     Back pain has many possible causes. It is often caused by problems with muscles and ligaments in your back. The extra weight during pregnancy can put stress on your back. Moving, lifting, standing, sitting, or sleeping in an awkward way also can strain your back. Back pain can also be a sign of labor. Although it may hurt a lot, back pain often improves on its own. Use good home treatment, and take care not to stress your back.  Follow-up care is a key part of your treatment and safety. Be sure to make and go to all appointments, and call your doctor if you are having problems. It's also a good idea to know your test results and keep a list of the medicines you take.  How can you care for yourself at home?  Ask your doctor about taking acetaminophen (Tylenol) for pain. Do not take aspirin, ibuprofen (Advil, Motrin), or naproxen (Aleve).  Do not take two or more pain medicines at the same time unless the doctor told you to. Many pain medicines have acetaminophen, which is Tylenol. Too much acetaminophen (Tylenol) can be harmful.  Lie on your side with your knees and hips bent and a pillow between your legs. This reduces stress on your back.  Put ice or cold packs on your back for 10 to 20 minutes at a time, several times a day. Put a thin cloth between the ice and your skin.  Warm baths may also help reduce pain.  Change positions every 30 minutes. Take breaks if you must sit for a long time. Get up and walk around.  Ask your doctor about how much exercise you " "can do. You may feel better taking short walks or doing gentle movements and stretching in a swimming pool.  Ask your doctor about exercises to stretch and strengthen your back.  When should you call for help?   Call your doctor now or seek immediate medical care if:    You think you are in labor.     You have new numbness in your buttocks, genital or rectal areas, or legs.     You have a new loss of bowel or bladder control.   Watch closely for changes in your health, and be sure to contact your doctor if:    You do not get better as expected.   Where can you learn more?  Go to https://www.[employer].net/patiented  Enter C696 in the search box to learn more about \"Back Pain During Pregnancy: Care Instructions.\"  Current as of: 2023               Content Version: 13.8    4236-4150 [employer].   Care instructions adapted under license by your healthcare professional. If you have questions about a medical condition or this instruction, always ask your healthcare professional. [employer] disclaims any warranty or liability for your use of this information.       Labor: Care Instructions  Overview      labor is the start of labor between 20 and 36 weeks of pregnancy. Most babies are born at 37 to 42 weeks of pregnancy. In labor, the uterus contracts to open the cervix. This is the first stage of childbirth.  labor can be caused by a problem with the baby, the mother, or both. Often the cause is not known.  In some cases, doctors use medicines to try to delay labor until 34 or more weeks of pregnancy. By this time, a baby has grown enough so that problems are not likely. In some cases--such as with a serious infection--it is healthier for the baby to be born early. Your treatment will depend on how far along you are in your pregnancy and on your health and your baby's health.  Follow-up care is a key part of your treatment and safety. Be sure to make and go to all " appointments, and call your doctor if you are having problems. It's also a good idea to know your test results and keep a list of the medicines you take.  How can you care for yourself at home?  If your doctor prescribed medicines, take them exactly as directed. Call your doctor if you think you are having a problem with your medicine.  Rest until your doctor advises you about activity.  Do not have sexual intercourse unless your doctor says it is safe.  Use sanitary pads if you have vaginal bleeding. Using pads makes it easier to monitor your bleeding.  Do not smoke or allow others to smoke around you. If you need help quitting, talk to your doctor about stop-smoking programs and medicines. These can increase your chances of quitting for good.  When should you call for help?   Call 911  anytime you think you may need emergency care. For example, call if:    You passed out (lost consciousness).     You have a seizure.     You have severe vaginal bleeding.     You have severe pain in your belly or pelvis that doesn't get better between contractions.     You have had fluid gushing or leaking from your vagina and you know or think the umbilical cord is bulging into your vagina. If this happens, immediately get down on your knees so your rear end (buttocks) is higher than your head. This will decrease the pressure on the cord until help arrives.   Call your doctor now or seek immediate medical care if:    You have signs of preeclampsia, such as:  Sudden swelling of your face, hands, or feet.  New vision problems (such as dimness, blurring, or seeing spots).  A severe headache.     You have any vaginal bleeding.     You have belly pain or cramping.     You have a fever.     You have had regular contractions (with or without pain) for an hour. This means that you have 6 or more within 1 hour after you change your position and drink fluids.     You have a sudden release of fluid from the vagina.     You have low back pain  "or pelvic pressure that does not go away.     You notice that your baby has stopped moving or is moving much less than normal.   Watch closely for changes in your health, and be sure to contact your doctor if you have any problems.  Where can you learn more?  Go to https://www.introNetworks.net/patiented  Enter Q400 in the search box to learn more about \" Labor: Care Instructions.\"  Current as of: 2023               Content Version: 13.8    9731-9576 [employer].   Care instructions adapted under license by your healthcare professional. If you have questions about a medical condition or this instruction, always ask your healthcare professional. [employer] disclaims any warranty or liability for your use of this information.      "

## 2024-02-27 NOTE — PROGRESS NOTES
20w0d  Here alone today.  Feels good, reports some heart burn.  Asking for gelatin free PVN prescription   Fetal movement: positive   Denies loss of fluid/vb/contractions  Anatomy ultrasound preliminary results discussed; to be reviewed by OB MD, Placenta:  anterior, marginal cord insertion on US today  GCT visit between 24-28 weeks, handout provided, reminded of longer appointment  Round ligament pain and comfort measures reviewed  Discussed growth US q 4 weeks starting at 28 wks d/t marginal CI  *would like a call with US results when available    Return to clinic 4 weeks for GCT and OB visit    Marjorie BUCKLEY, KENIA, [facility]-BC  [phone]      **tablet [employer]  used the entire visit

## 2024-03-25 NOTE — PATIENT INSTRUCTIONS
"Weeks 22 to 26 of Your Pregnancy: Care Instructions  Your baby's lungs are getting ready for breathing. Your baby may respond to your voice. Your baby likely turns less, and kicks or jerks more. Jerking may mean that your baby has hiccups.    Think about taking childbirth classes. And start to think about whether you want to have pain medicine during labor.    At your next doctor visit, you may be tested for anemia and for high blood sugar that first occurs during pregnancy (gestational diabetes). These conditions can cause problems for you and your baby.    To ease discomfort, such as back pain    Change your position often. Try not to sit or stand for too long.  Get some exercise. Things like walking or stretching may help.  Try using a heating pad or cold pack.    To ease or reduce swelling in your feet, ankles, hands, and fingers    Take off your rings.  Avoid high-sodium foods, such as potato chips.  Prop up your feet, and sleep with pillows under your feet.  Try to avoid standing for long periods of time.  Do not wear tight shoes.  Wear support stockings.  Kegel exercises to prevent urine from leaking    Squeeze your muscles as if you were trying not to pass gas. Your belly, legs, and buttocks shouldn't move. Hold the squeeze for 3 seconds, then relax for 5 to 10 seconds.    Add 1 second each week until you can squeeze for 10 seconds. Repeat the exercise 10 times a session. Do 3 to 8 sessions a day. If these exercises cause you pain, stop doing them and talk with your doctor.  Follow-up care is a key part of your treatment and safety. Be sure to make and go to all appointments, and call your doctor if you are having problems. It's also a good idea to know your test results and keep a list of the medicines you take.  Where can you learn more?  Go to https://www.healthwise.net/patiented  Enter G264 in the search box to learn more about \"Weeks 22 to 26 of Your Pregnancy: Care Instructions.\"  Current as of: July " 10, 2023               Content Version: 14.0    6981-1904 [employer].   Care instructions adapted under license by your healthcare professional. If you have questions about a medical condition or this instruction, always ask your healthcare professional. [employer] disclaims any warranty or liability for your use of this information.      Learning About Screening for Gestational Diabetes  What is gestational diabetes screening?     Screening for gestational diabetes is a way to look for high blood sugar during pregnancy. You drink some very sweet liquid. Then you have a blood test to see how your body uses sugar (glucose).  How is gestational diabetes screening done?  Screening for gestational diabetes may be done in a couple of ways.  Two-part screening.  Part one (glucose challenge test): A blood sample is taken after you drink a liquid that contains sugar (glucose). You don't need to stop eating or drinking before this test. If the test shows that you don't have a lot of sugar in your blood, you don't have gestational diabetes.  Part two (oral glucose tolerance test, or OGTT): If the first test shows a lot of sugar in your blood, then you may have an OGTT. You can't eat or drink for at least 8 hours before this test. A blood sample is taken, then you drink a sweet liquid. You have more blood tests after 1 to 3 hours. If the OGTT shows that you have a lot of sugar in your blood, you may have gestational diabetes.  One-part screening.  Sometimes doctors use the OGTT on its own. If the test shows that you don't have a lot of sugar in your blood, you don't have gestational diabetes. If you do have a lot of sugar in your blood, you may have the condition.  What are the risks of screening?  Your blood glucose level may drop very low toward the end of the test. If this happens, you may feel weak, hungry, and restless. Tell your doctor if you have these symptoms. The test usually will be  "stopped.  You may vomit after drinking the sweet liquid. If this happens, you may need to take the test at a later time.  Your doctor may do more glucose tests at other times during your pregnancy.  Follow-up care is a key part of your treatment and safety. Be sure to make and go to all appointments, and call your doctor if you are having problems. It's also a good idea to know your test results and keep a list of the medicines you take.  Where can you learn more?  Go to https://www.[employer] [facility].net/patiented  Enter A472 in the search box to learn more about \"Learning About Screening for Gestational Diabetes.\"  Current as of: 2023               Content Version: 14.0    7097-1255 [employer].   Care instructions adapted under license by your healthcare professional. If you have questions about a medical condition or this instruction, always ask your healthcare professional. [employer] disclaims any warranty or liability for your use of this information.      You have been provided the My Labor and Birth Wishes document.  Please review at home and bring to your next prenatal visit. Bring this sheet to the hospital for your birth. Give copies to your care team members and support person.   Additional copies can be found here:  [email or web address]  Weeks 26 to 30 of Your Pregnancy: Care Instructions  You're starting your last trimester. You'll probably feel your baby moving around more. Your back may ache as your body gets used to your baby's size and length. Take care of yourself, and pay attention to what your body needs.    Talk to your doctor about getting the Tdap shot. It will help protect your  against whooping cough (pertussis). Also ask your doctor about flu and COVID-19 shots if you haven't had them yet. If your blood type is Rh negative, you may be given a shot of Rh immune globulin (such as RhoGAM). It can help prevent problems for your baby.    You may have " "Letcher-Hernandez contractions. They are single or several strong contractions without a pattern. These are practice contractions but not the start of labor.  Be kind to yourself.     Take breaks when you're tired.  Change positions often. Don't sit for too long or stand for too long.  At work, rest during breaks if you can. If you don't get breaks, talk to your doctor about writing a letter to your employer to request them.  Avoid fumes, chemicals, and tobacco smoke.  Be sexual if you want to.     You may be interested in sex, or you may not. Everyone is different.  Sex is okay unless your doctor tells you not to.  Your belly can make it hard to find good positions for sex. [place] and explore.  Watch for signs of  labor.    These signs include:   Menstrual-like cramps. Or you may have pain or pressure in your pelvis that happens in a pattern.  About 6 or more contractions in an hour (even after rest and a glass of water).  A low, dull backache that doesn't go away when you change positions.  An increase or change in vaginal discharge.  Light vaginal bleeding or spotting.  Your water breaking.  Know what to do if you think you are having contractions.     Drink 1 or 2 glasses of water.  Lie down on your left side for at least an hour.  While on your side, feel the top of your belly to see if it's tight.  Write down your contractions for an hour. Time how long it is from the start of one contraction to the start of the next.  Call your doctor if you have regular contractions.  Follow-up care is a key part of your treatment and safety. Be sure to make and go to all appointments, and call your doctor if you are having problems. It's also a good idea to know your test results and keep a list of the medicines you take.  Where can you learn more?  Go to https://www.healthwise.net/patiented  Enter S999 in the search box to learn more about \"Weeks 26 to 30 of Your Pregnancy: Care Instructions.\"  Current as of: July " 10, 2023               Content Version: 14.0    0932-0824 [employer].   Care instructions adapted under license by your healthcare professional. If you have questions about a medical condition or this instruction, always ask your healthcare professional. [employer] disclaims any warranty or liability for your use of this information.

## 2024-03-26 ENCOUNTER — PRENATAL OFFICE VISIT (OUTPATIENT)
Dept: MIDWIFE SERVICES | Facility: CLINIC | Age: 29
End: 2024-03-26
Payer: COMMERCIAL

## 2024-03-26 VITALS — WEIGHT: 182.8 LBS | DIASTOLIC BLOOD PRESSURE: 64 MMHG | SYSTOLIC BLOOD PRESSURE: 108 MMHG | BODY MASS INDEX: 32.38 KG/M2

## 2024-03-26 DIAGNOSIS — O43.199 MARGINAL INSERTION OF UMBILICAL CORD AFFECTING MANAGEMENT OF MOTHER: ICD-10-CM

## 2024-03-26 DIAGNOSIS — Z67.91 RH NEGATIVE STATE IN ANTEPARTUM PERIOD: ICD-10-CM

## 2024-03-26 DIAGNOSIS — Z3A.24 24 WEEKS GESTATION OF PREGNANCY: ICD-10-CM

## 2024-03-26 DIAGNOSIS — O09.92 SUPERVISION OF HIGH RISK PREGNANCY IN SECOND TRIMESTER: Primary | ICD-10-CM

## 2024-03-26 DIAGNOSIS — O26.899 RH NEGATIVE STATE IN ANTEPARTUM PERIOD: ICD-10-CM

## 2024-03-26 PROCEDURE — 99207 PR PRENATAL VISIT: CPT | Performed by: ADVANCED PRACTICE MIDWIFE

## 2024-03-26 NOTE — PROGRESS NOTES
24w0d  Feels well overall. Here alone today. No questions or concerns. Declined gct today because she recalled having it done later in pregnancy before.   Moving an hour away for better housing in the next few weeks. She has not made plans for prenatal care yet. She is excited about the move.  Fetal movement: positive   Denies loss of fluid/vb/contractions, signs and symptoms preeclampsia    Tdap next visit; reviewed CDC recommendations and partner/family vaccination recommended as well    Need for Rhogam? Yes, to be done next visit     Counseled on signs and symptoms of PTL, preeclampsia, discussed fetal movement awareness  Informed that it's important to find care when she moves because the gct and rhogam should be done at 28 weeks.   Return to clinic 4 weeks for prenatal visit and growth ultrasound.    To be done at next appointment with new provider:   1) Growth ultrasound for marginal cord insertion  2) GCT  3) Rhogam for A negative blood type    LIZBET Castellanos  [facility]    I was present with the CNM student who participated in the documentation of the services provided. I have verified the history and personally performed the physical exam and medical decision making, as documented by the student and edited by me.     Tamie Mariee, DNP, APRN, CNM

## 2024-06-19 DIAGNOSIS — O43.199 MARGINAL INSERTION OF UMBILICAL CORD AFFECTING MANAGEMENT OF MOTHER: Primary | ICD-10-CM

## 2024-06-26 ENCOUNTER — PRENATAL OFFICE VISIT (OUTPATIENT)
Dept: MIDWIFE SERVICES | Facility: CLINIC | Age: 29
End: 2024-06-26
Attending: NURSE PRACTITIONER
Payer: COMMERCIAL

## 2024-06-26 ENCOUNTER — ANCILLARY PROCEDURE (OUTPATIENT)
Dept: ULTRASOUND IMAGING | Facility: CLINIC | Age: 29
End: 2024-06-26
Attending: NURSE PRACTITIONER
Payer: COMMERCIAL

## 2024-06-26 VITALS — BODY MASS INDEX: 34.22 KG/M2 | WEIGHT: 193.2 LBS | SYSTOLIC BLOOD PRESSURE: 112 MMHG | DIASTOLIC BLOOD PRESSURE: 72 MMHG

## 2024-06-26 DIAGNOSIS — O26.893 HEARTBURN DURING PREGNANCY IN THIRD TRIMESTER: ICD-10-CM

## 2024-06-26 DIAGNOSIS — O43.199 MARGINAL INSERTION OF UMBILICAL CORD AFFECTING MANAGEMENT OF MOTHER: ICD-10-CM

## 2024-06-26 DIAGNOSIS — O09.93 SUPERVISION OF HIGH RISK PREGNANCY IN THIRD TRIMESTER: Primary | ICD-10-CM

## 2024-06-26 DIAGNOSIS — K21.9 GASTROESOPHAGEAL REFLUX DISEASE WITHOUT ESOPHAGITIS: ICD-10-CM

## 2024-06-26 DIAGNOSIS — R12 HEARTBURN DURING PREGNANCY IN THIRD TRIMESTER: ICD-10-CM

## 2024-06-26 DIAGNOSIS — Z67.91 RH NEGATIVE STATE IN ANTEPARTUM PERIOD: ICD-10-CM

## 2024-06-26 DIAGNOSIS — O09.299 HISTORY OF DELIVERY BY VACUUM EXTRACTION, CURRENTLY PREGNANT: ICD-10-CM

## 2024-06-26 DIAGNOSIS — O26.899 RH NEGATIVE STATE IN ANTEPARTUM PERIOD: ICD-10-CM

## 2024-06-26 DIAGNOSIS — Z3A.37 37 WEEKS GESTATION OF PREGNANCY: ICD-10-CM

## 2024-06-26 DIAGNOSIS — O09.299 HX OF MATERNAL LACERATION, 3RD DEGREE, CURRENTLY PREGNANT: ICD-10-CM

## 2024-06-26 PROCEDURE — 76816 OB US FOLLOW-UP PER FETUS: CPT

## 2024-06-26 PROCEDURE — 87653 STREP B DNA AMP PROBE: CPT | Performed by: ADVANCED PRACTICE MIDWIFE

## 2024-06-26 PROCEDURE — T1013 SIGN LANG/ORAL INTERPRETER: HCPCS | Mod: U4

## 2024-06-26 PROCEDURE — 99207 PR PRENATAL VISIT: CPT | Performed by: ADVANCED PRACTICE MIDWIFE

## 2024-06-26 RX ORDER — PNV NO.95/FERROUS FUM/FOLIC AC 28MG-0.8MG
1 TABLET ORAL DAILY
Qty: 90 TABLET | Refills: 3 | Status: SHIPPED | OUTPATIENT
Start: 2024-06-26

## 2024-06-26 NOTE — PROGRESS NOTES
37w1d  Feels fine, pepcid not working for heartburn would like omeprazole. Recently transferring care back from [facility]. Stopped taking prenatal because she ran out  Fetal movement: positive  Denies vaginal bleeding/lof, no contractions, denies signs and symptoms preeclampsia  Discussed growth US, 45%, cephalic, efw 6'4 comparable to last US at [place] 42%  Fetal kick/movement counts reviewed  Labor signs and symptoms discussed, aware of numbers to call, patient given new clinic phone number  Discussed warning signs, signs and symptoms preeclampsia  Recommend she schedule visits out with   SVE 1.5/50/-3/posterior  GBS swab done  Return to clinic 1 week    *abnormality noted in report, SDP 9.1, called imaging center and connected to [facility] to review US and provide FRACISCO for US given abnormal SDP/MVP     used via ipad for visit    INA Logan, GUMAROM

## 2024-06-28 ENCOUNTER — HOSPITAL ENCOUNTER (EMERGENCY)
Facility: CLINIC | Age: 29
Discharge: HOME | End: 2024-06-28
Admitting: SOCIAL WORKER
Payer: COMMERCIAL

## 2024-06-28 VITALS
HEIGHT: 64 IN | RESPIRATION RATE: 16 BRPM | HEART RATE: 102 BPM | DIASTOLIC BLOOD PRESSURE: 80 MMHG | WEIGHT: 193 LBS | SYSTOLIC BLOOD PRESSURE: 121 MMHG | OXYGEN SATURATION: 100 % | TEMPERATURE: 97.6 F | BODY MASS INDEX: 32.95 KG/M2

## 2024-06-28 PROBLEM — B95.1 POSITIVE GBS TEST: Status: ACTIVE | Noted: 2024-06-28

## 2024-06-28 LAB
ANION GAP SERPL CALCULATED.3IONS-SCNC: 12 MMOL/L (ref 7–15)
BUN SERPL-MCNC: 5.9 MG/DL (ref 6–20)
CALCIUM SERPL-MCNC: 9.3 MG/DL (ref 8.6–10)
CHLORIDE SERPL-SCNC: 104 MMOL/L (ref 98–107)
CREAT SERPL-MCNC: 0.58 MG/DL (ref 0.51–0.95)
DEPRECATED HCO3 PLAS-SCNC: 19 MMOL/L (ref 22–29)
EGFRCR SERPLBLD CKD-EPI 2021: >90 ML/MIN/1.73M2
ERYTHROCYTE [DISTWIDTH] IN BLOOD BY AUTOMATED COUNT: 13.5 % (ref 10–15)
GLUCOSE SERPL-MCNC: 95 MG/DL (ref 70–99)
GP B STREP DNA SPEC QL NAA+PROBE: POSITIVE
HCT VFR BLD AUTO: 32.7 % (ref 35–47)
HGB BLD-MCNC: 11.1 G/DL (ref 11.7–15.7)
HOLD SPECIMEN: NORMAL
MCH RBC QN AUTO: 29.1 PG (ref 26.5–33)
MCHC RBC AUTO-ENTMCNC: 33.9 G/DL (ref 31.5–36.5)
MCV RBC AUTO: 86 FL (ref 78–100)
PLATELET # BLD AUTO: 197 10E3/UL (ref 150–450)
POTASSIUM SERPL-SCNC: 3.6 MMOL/L (ref 3.4–5.3)
RBC # BLD AUTO: 3.81 10E6/UL (ref 3.8–5.2)
SODIUM SERPL-SCNC: 135 MMOL/L (ref 135–145)
TROPONIN T SERPL HS-MCNC: <6 NG/L
WBC # BLD AUTO: 8.4 10E3/UL (ref 4–11)

## 2024-06-28 PROCEDURE — 99281 EMR DPT VST MAYX REQ PHY/QHP: CPT

## 2024-06-28 PROCEDURE — 84484 ASSAY OF TROPONIN QUANT: CPT | Performed by: SOCIAL WORKER

## 2024-06-28 PROCEDURE — 82565 ASSAY OF CREATININE: CPT | Performed by: SOCIAL WORKER

## 2024-06-28 PROCEDURE — 85041 AUTOMATED RBC COUNT: CPT | Performed by: SOCIAL WORKER

## 2024-06-28 PROCEDURE — 36415 COLL VENOUS BLD VENIPUNCTURE: CPT | Performed by: SOCIAL WORKER

## 2024-06-28 ASSESSMENT — COLUMBIA-SUICIDE SEVERITY RATING SCALE - C-SSRS
2. HAVE YOU ACTUALLY HAD ANY THOUGHTS OF KILLING YOURSELF IN THE PAST MONTH?: NO
6. HAVE YOU EVER DONE ANYTHING, STARTED TO DO ANYTHING, OR PREPARED TO DO ANYTHING TO END YOUR LIFE?: NO
1. IN THE PAST MONTH, HAVE YOU WISHED YOU WERE DEAD OR WISHED YOU COULD GO TO SLEEP AND NOT WAKE UP?: NO

## 2024-06-28 NOTE — ED TRIAGE NOTES
Pt presents via EMS with complaints of hyperventilating since 0400. Pt is 37 weeks pregnant.Pt denies any other sx to EMS

## 2024-06-28 NOTE — ED TRIAGE NOTES
Pt presents via EMS for eval of SOB since last night. Pt os 37 weeks pregnant. Brother reports that she doesn't sleep at night. Pt nothing makes pain better or worse. Pt has eyes closed and somewhat hyperventilating.      Triage Assessment (Adult)       Row Name 06/28/24 1055          Cardiac WDL    Cardiac WDL chest pain        Chest Pain Assessment    Chest Pain Location substernal;epigastric     Chest Pain Radiation shoulder     Precipitating Factors activity

## 2024-07-05 ENCOUNTER — PRENATAL OFFICE VISIT (OUTPATIENT)
Dept: MIDWIFE SERVICES | Facility: CLINIC | Age: 29
End: 2024-07-05
Payer: COMMERCIAL

## 2024-07-05 VITALS — DIASTOLIC BLOOD PRESSURE: 76 MMHG | SYSTOLIC BLOOD PRESSURE: 100 MMHG | BODY MASS INDEX: 33.3 KG/M2 | WEIGHT: 194 LBS

## 2024-07-05 DIAGNOSIS — Z3A.38 38 WEEKS GESTATION OF PREGNANCY: ICD-10-CM

## 2024-07-05 DIAGNOSIS — O09.93 SUPERVISION OF HIGH RISK PREGNANCY IN THIRD TRIMESTER: Primary | ICD-10-CM

## 2024-07-05 DIAGNOSIS — B95.1 POSITIVE GBS TEST: ICD-10-CM

## 2024-07-05 PROCEDURE — 99207 PR PRENATAL VISIT: CPT | Performed by: ADVANCED PRACTICE MIDWIFE

## 2024-07-05 PROCEDURE — T1013 SIGN LANG/ORAL INTERPRETER: HCPCS | Mod: U4

## 2024-07-05 NOTE — PROGRESS NOTES
38w3d  Feels well overall. Here alone today  Fetal movement: positive  Denies vaginal bleeding/lof, no contractions, denies signs and symptoms preeclampsia  Code status at hospital: Full  Discussed positive GBS test results and recommendation for antibiotics treatment in labor.     Fetal kick/movement counts, denies questions  Labor signs and symptoms discussed, aware of numbers to call  AVS provided on warning signs, signs and symptoms preeclampsia    Plans to breastfeed: Will provide a breast pump prior to hospital discharge    Return to clinic 1 week    Orquidea BUCKLEY CNM      used for entire visit

## 2024-07-05 NOTE — PATIENT INSTRUCTIONS
"\"I hope you had a positive experience and that you can definitely recommend [facility] to your family and friends. You ll be receiving a survey soon and I would look forward to hearing your feedback\".  Week 38 of Your Pregnancy: Care Instructions  Believe it or not, your baby is almost here. You may notice how your baby responds to sounds, warmth, cold, and light. You may even know what kind of music your baby likes.    Even if you expect a vaginal birth, it's a good idea to learn about  section ().  is the delivery of a baby through a cut (incision) in your belly. It's a major surgery, so it has more risks than a vaginal birth.    During the first 2 weeks after the birth, limit visitors. Don't allow visitors who have colds or infections. Ask visitors to wash their hands before they touch your baby. And never let anyone smoke around your baby.    Know about unplanned C-sections.  Reasons for an unplanned  include labor that slows or stops, signs of distress in your baby, and high blood pressure or other problems for you.     Know about planned C-sections.  If your baby isn't in a head-down position for delivery (breech position), your doctor may plan a . Or you may have a planned  if you're having twins or more.     Get as much help as you can while you're in the hospital.  You can learn about feeding, diapering, and bathing your baby.     Talk to your doctor or midwife about how to care for the umbilical cord stump.  If your baby will be circumcised, also ask about how to care for that.     Ask friends or family for help, as you need it.  If you can, have another adult in your home for at least 2 or 3 days after the birth.     Try to nap when your baby naps.  This may be your best chance to get some sleep.     Watch for changes in your mental health.  For the first 1 to 2 weeks after birth, it's common to cry or feel sad or irritable. If these " "feelings last for more than 2 weeks, you may have postpartum depression. Ask your doctor for help. Postpartum depression can be treated.   Follow-up care is a key part of your treatment and safety. Be sure to make and go to all appointments, and call your doctor if you are having problems. It's also a good idea to know your test results and keep a list of the medicines you take.  Where can you learn more?  Go to https://www.[employer].net/patiented  Enter B044 in the search box to learn more about \"Week 38 of Your Pregnancy: Care Instructions.\"  Current as of: July 10, 2023               Content Version: 14.0    0195-9326 [employer].   Care instructions adapted under license by your healthcare professional. If you have questions about a medical condition or this instruction, always ask your healthcare professional. [employer] disclaims any warranty or liability for your use of this information.      Week 39 of Your Pregnancy: Care Instructions     babies can look different than what you see in pictures or movies. Their heads can be a strange shape right after birth. And they may have swollen eyes and red marks on their faces.    You can still get pregnant even if you are breastfeeding. If you don't want to get pregnant, talk to your doctor about birth control.  Tips for week 39 of pregnancy  If you plan to breastfeed, get prepared.     Continue to eat healthy foods.  Keep taking your prenatal vitamins during breastfeeding if your doctor recommends it.  Talk to your doctor before taking any medicines or supplements.  Choose the right birth control for you.     Intrauterine devices (IUDs) are placed in the uterus. Sometimes the IUD can be placed right after giving birth. They work for years.  Hormonal implants are placed under the skin of the arm. They also work for years.  Depo-Provera is a shot. You get it every 3 months.  Birth control pills can be used. They're taken every " "day.  Tubal ligation (tying your tubes) and vasectomy are surgeries. They're permanent.  Diaphragms, spermicide, and condoms must be used each time you have sex. If you used a diaphragm before, you should get refitted after the baby is born.  A birth control patch or ring can be used. These just can't be started until several weeks after you give birth.  Follow-up care is a key part of your treatment and safety. Be sure to make and go to all appointments, and call your doctor if you are having problems. It's also a good idea to know your test results and keep a list of the medicines you take.  Where can you learn more?  Go to https://www.[employer].net/patiented  Enter A811 in the search box to learn more about \"Week 39 of Your Pregnancy: Care Instructions.\"  Current as of: July 10, 2023               Content Version: 14.0    2043-9959 [employer].   Care instructions adapted under license by your healthcare professional. If you have questions about a medical condition or this instruction, always ask your healthcare professional. [employer] disclaims any warranty or liability for your use of this information.      "

## 2024-07-08 NOTE — PROGRESS NOTES
39w0d  Feels good, no questions or concerns today.   Informed of POS GBS results.  Fetal movement: positive  Denies vaginal bleeding/lof, no contractions, denies signs and symptoms preeclampsia    SVE: FT/50%/-3/mid/soft    Fetal kick/movement counts reviewed  Labor signs and symptoms discussed, aware of numbers to call  Discussed warning signs, signs and symptoms preeclampsia  Discussed need for BPP and OB visit for next week    Return to clinic 1 week    Marjorie BUCKLEY, KENIA, [facility]  [phone]    **Phone  used during entire visit

## 2024-07-09 ENCOUNTER — PRENATAL OFFICE VISIT (OUTPATIENT)
Dept: MIDWIFE SERVICES | Facility: CLINIC | Age: 29
End: 2024-07-09
Payer: COMMERCIAL

## 2024-07-09 VITALS — WEIGHT: 194 LBS | DIASTOLIC BLOOD PRESSURE: 70 MMHG | BODY MASS INDEX: 33.3 KG/M2 | SYSTOLIC BLOOD PRESSURE: 112 MMHG

## 2024-07-09 DIAGNOSIS — O09.299 HX OF MATERNAL LACERATION, 3RD DEGREE, CURRENTLY PREGNANT: ICD-10-CM

## 2024-07-09 DIAGNOSIS — O09.299 HISTORY OF DELIVERY BY VACUUM EXTRACTION, CURRENTLY PREGNANT: ICD-10-CM

## 2024-07-09 DIAGNOSIS — O09.93 SUPERVISION OF HIGH RISK PREGNANCY IN THIRD TRIMESTER: Primary | ICD-10-CM

## 2024-07-09 DIAGNOSIS — O26.899 RH NEGATIVE STATE IN ANTEPARTUM PERIOD: ICD-10-CM

## 2024-07-09 DIAGNOSIS — Z67.91 RH NEGATIVE STATE IN ANTEPARTUM PERIOD: ICD-10-CM

## 2024-07-09 DIAGNOSIS — O43.199 MARGINAL INSERTION OF UMBILICAL CORD AFFECTING MANAGEMENT OF MOTHER: ICD-10-CM

## 2024-07-09 DIAGNOSIS — Z3A.39 39 WEEKS GESTATION OF PREGNANCY: ICD-10-CM

## 2024-07-09 DIAGNOSIS — B95.1 POSITIVE GBS TEST: ICD-10-CM

## 2024-07-09 PROCEDURE — 99207 PR PRENATAL VISIT: CPT | Performed by: NURSE PRACTITIONER

## 2024-07-09 NOTE — PATIENT INSTRUCTIONS
"PREECLAMPSIA SIGNS AND SYMPTOMS    Preeclampsia is a dangerous condition that some women develop in the second half of pregnancy. It can also begin after the baby is born.  Preeclampsia causes high blood pressure and can cause problems with many organ systems in your body.  It can also affect the growth of your baby. The exact cause of preeclampsia is unknown, however, there are signs and symptoms to watch for:    -A bad headache that doesn't improve with Tylenol  -Visual changes such as spots, flashes of light, blurry vision  -Pain in the upper right part of your abdomen, especially under the ribs that doesn't go away  -Nausea and/or vomiting  -Feeling extremely tired  -Yellowing of the skin and/or eyes  -Feeling \"not quite right\" or that something is wrong  -An extreme amount of swelling (some swelling in pregnancy is very normal)    If your midwife feels that you are developing preeclampsia, you will have lab tests drawn and will be monitored very closely.     If you are experiencing anyof these symptoms, call the [facility] immediately at [phone].   Labor Instructions for Midwife Patients    When to call:  Both during and after office hours call  [phone]. There is a triage RN to take your calls and answer your questions 24 hours a day.  If they cannot answer your question they will page the midwife on call for you.    When to call:  Call anytime you have important concerns about you or your baby.     Call if:  You are having contractions at regular intervals about 5-6 minutes apart lasting 60 seconds and becoming increasingly more intense   You have an uncontrollable gush of fluid from your vagina or feel a pop and gush like your water has broken  You have HEAVY bleeding, like heavy period, blood running down your legs, or  soaking a pad.   Some bleeding after a pelvic exam, after intercourse, or in labor when your cervix is dilating is normal and is referred to as \"bloody " "show\"  You have severe, continuous back or abdominal pain  You feel it is time to go to the hospital  If this is your first labor, call when contractions are very intense and have been about every 3-4 minutes for about an hour  If it is your second labor or more, call when contractions are strong and about every 3-5 minutes or sooner depending on your level of discomfort.     Keep in mind we are always here for you! If you have questions, concerns please don't hesitate to call us.     What to eat/drink in labor: Drink plenty of fluid (water most importantly, juice, soda or tea without caffeine). Eat rice, pasta, soup, cereal, bread/toast, and fruit. Avoid dairy and greasy food as they are difficult to digest and you may experience some nausea during labor.    Comfort measures:  Baths and showers (ok even with ruptured membranes, it may temporarily slow contractions if you are still in the early stage of labor)  Warm/hot packs for back pain or discomfort  Back, belly, or thigh massages  Standing, rocking, walking, leaning over bed or tables, side-lying and sleeping    Miscellaneous:   Contractions are timed from the beginning of one to the beginning of the next  Try hard to sleep during the early stage of labor when you are not that uncomfortable. Timing of contractions at this point is not important  Even if you cannot sleep, resting in bed or on the couch can help you maintain your energy for labor  When you arrive at the hospital the nurse will check your baby's heartbeat, check your cervix, and will call us. The midwife on call will come in and be with you when you are in active labor  From 7am to 11pm, you can park in the East ramp and enter the hospital through Door 6  After hours you need to enter the hospital through the emergency room   Fetal Kick Counts    It is important to know when your baby's movements occur. We often get busy with work and life and do not pay close attention to their movements.    "   Women typically begin feeling movement between 18-22 weeks of gestation, sometimes it can be earlier or later depending on where your placenta is     Movements usually begin feeling like popping or fluttering and as the baby grows they become more pronounced    Toward the end of pregnancy as the baby gets larger they may not move as much or make as big of movements. Babies have maturing sleep cycles as well as not as much room to move and flip. If you are ever concerned about your baby's movements or have not felt the baby move for a while, we recommend you do a fetal kick count. Prior to starting your count drink a glass of water or juice and eat a snack. Then lay down on your side and begin to count movements.     How to do a Fetal Kick Counts    There are many different ways to monitor your baby's movements. Movements can range from large jabs to small kicks, or wiggles.  Hiccups count!    Count 10 movements in 2 hours when resting and focusing  Count 10 movements in 12 hours when doing normal activity    We recommend that if movements occur but seem decreased that you should be seen in the clinic or hospital for evaluation within 12 hours. If fetal movement is absent or fetal kick counts are low please contact us right away.    If you ever have any concerns about your baby's movements DO NOT HESITATE to call us, we are here for you!    [facility]    [phone]     Week 39 of Your Pregnancy: Care Instructions    [place] babies can look different than what you see in pictures or movies. Their heads can be a strange shape right after birth. And they may have swollen eyes and red marks on their faces.    You can still get pregnant even if you are breastfeeding. If you don't want to get pregnant, talk to your doctor about birth control.  Tips for week 39 of pregnancy  If you plan to breastfeed, get prepared.     Continue to eat healthy foods.  Keep taking your prenatal vitamins during breastfeeding  "if your doctor recommends it.  Talk to your doctor before taking any medicines or supplements.  Choose the right birth control for you.     Intrauterine devices (IUDs) are placed in the uterus. Sometimes the IUD can be placed right after giving birth. They work for years.  Hormonal implants are placed under the skin of the arm. They also work for years.  Depo-Provera is a shot. You get it every 3 months.  Birth control pills can be used. They're taken every day.  Tubal ligation (tying your tubes) and vasectomy are surgeries. They're permanent.  Diaphragms, spermicide, and condoms must be used each time you have sex. If you used a diaphragm before, you should get refitted after the baby is born.  A birth control patch or ring can be used. These just can't be started until several weeks after you give birth.  Follow-up care is a key part of your treatment and safety. Be sure to make and go to all appointments, and call your doctor if you are having problems. It's also a good idea to know your test results and keep a list of the medicines you take.  Where can you learn more?  Go to https://www.[employer].net/patiented  Enter A811 in the search box to learn more about \"Week 39 of Your Pregnancy: Care Instructions.\"  Current as of: July 10, 2023               Content Version: 14.0    5952-9296 [employer].   Care instructions adapted under license by your healthcare professional. If you have questions about a medical condition or this instruction, always ask your healthcare professional. [employer] disclaims any warranty or liability for your use of this information.      "

## 2024-07-15 NOTE — PROGRESS NOTES
40w0d  Feels well. No questions or concerns for me today   BPP 8/8, cephalic, normal MVP,  bpm  Fetal movement: positive  Denies vaginal bleeding/lof, no contractions, denies signs and symptoms preeclampsia  FT/50%/-3/mid/soft     Discussed post dates management, order for BPP between 40-41 weeks, again at 41 and 41+ weeks, discussed recommendation for induction of labor after 41 weeks. She prefers to wait for labor this week and have 41 wk appt to further discuss IOL     Fetal kick/movement counts reviewed  Labor signs and symptoms discussed, aware of numbers to call  Discussed warning signs, signs and symptoms preeclampsia    Return to clinic 1 week for BPP and routine visit    INA Claudio, CNM

## 2024-07-15 NOTE — PATIENT INSTRUCTIONS
"Labor Instructions for Midwife Patients    When to call:  Both during and after office hours call  [phone]. There is a triage RN to take your calls and answer your questions 24 hours a day.  If they cannot answer your question they will page the midwife on call for you.    When to call:  Call anytime you have important concerns about you or your baby.     Call if:  You are having contractions at regular intervals about 5-6 minutes apart lasting 60 seconds and becoming increasingly more intense   You have an uncontrollable gush of fluid from your vagina or feel a pop and gush like your water has broken  You have HEAVY bleeding, like heavy period, blood running down your legs, or  soaking a pad.   Some bleeding after a pelvic exam, after intercourse, or in labor when your cervix is dilating is normal and is referred to as \"bloody show\"  You have severe, continuous back or abdominal pain  You feel it is time to go to the hospital  If this is your first labor, call when contractions are very intense and have been about every 3-4 minutes for about an hour  If it is your second labor or more, call when contractions are strong and about every 3-5 minutes or sooner depending on your level of discomfort.     Keep in mind we are always here for you! If you have questions, concerns please don't hesitate to call us.     What to eat/drink in labor: Drink plenty of fluid (water most importantly, juice, soda or tea without caffeine). Eat rice, pasta, soup, cereal, bread/toast, and fruit. Avoid dairy and greasy food as they are difficult to digest and you may experience some nausea during labor.    Comfort measures:  Baths and showers (ok even with ruptured membranes, it may temporarily slow contractions if you are still in the early stage of labor)  Warm/hot packs for back pain or discomfort  Back, belly, or thigh massages  Standing, rocking, walking, leaning over bed or tables, side-lying and sleeping    Miscellaneous: " "  Contractions are timed from the beginning of one to the beginning of the next  Try hard to sleep during the early stage of labor when you are not that uncomfortable. Timing of contractions at this point is not important  Even if you cannot sleep, resting in bed or on the couch can help you maintain your energy for labor  When you arrive at the hospital the nurse will check your baby's heartbeat, check your cervix, and will call us. The midwife on call will come in and be with you when you are in active labor  From 7am to 11pm, you can park in the East ramp and enter the hospital through Door 6  After hours you need to enter the hospital through the emergency room      PREECLAMPSIA SIGNS AND SYMPTOMS    Preeclampsia is a dangerous condition that some women develop in the second half of pregnancy. It can also begin after the baby is born.  Preeclampsia causes high blood pressure and can cause problems with many organ systems in your body.  It can also affect the growth of your baby. The exact cause of preeclampsia is unknown, however, there are signs and symptoms to watch for:    -A bad headache that doesn't improve with Tylenol  -Visual changes such as spots, flashes of light, blurry vision  -Pain in the upper right part of your abdomen, especially under the ribs that doesn't go away  -Nausea and/or vomiting  -Feeling extremely tired  -Yellowing of the skin and/or eyes  -Feeling \"not quite right\" or that something is wrong  -An extreme amount of swelling (some swelling in pregnancy is very normal)    If your midwife feels that you are developing preeclampsia, you will have lab tests drawn and will be monitored very closely.     If you are experiencing anyof these symptoms, call the [facility] immediately at [phone].      Fetal Kick Counts    It is important to know when your baby's movements occur. We often get busy with work and life and do not pay close attention to their movements.    "   Women typically begin feeling movement between 18-22 weeks of gestation, sometimes it can be earlier or later depending on where your placenta is     Movements usually begin feeling like popping or fluttering and as the baby grows they become more pronounced    Toward the end of pregnancy as the baby gets larger they may not move as much or make as big of movements. Babies have maturing sleep cycles as well as not as much room to move and flip. If you are ever concerned about your baby's movements or have not felt the baby move for a while, we recommend you do a fetal kick count. Prior to starting your count drink a glass of water or juice and eat a snack. Then lay down on your side and begin to count movements.     How to do a Fetal Kick Counts    There are many different ways to monitor your baby's movements. Movements can range from large jabs to small kicks, or wiggles.  Hiccups count!    Count 10 movements in 2 hours when resting and focusing  Count 10 movements in 12 hours when doing normal activity    We recommend that if movements occur but seem decreased that you should be seen in the clinic or hospital for evaluation within 12 hours. If fetal movement is absent or fetal kick counts are low please contact us right away.    If you ever have any concerns about your baby's movements DO NOT HESITATE to call us, we are here for you!    [facility]    [phone]

## 2024-07-16 ENCOUNTER — ANCILLARY PROCEDURE (OUTPATIENT)
Dept: ULTRASOUND IMAGING | Facility: CLINIC | Age: 29
End: 2024-07-16
Attending: NURSE PRACTITIONER
Payer: COMMERCIAL

## 2024-07-16 ENCOUNTER — PRENATAL OFFICE VISIT (OUTPATIENT)
Dept: MIDWIFE SERVICES | Facility: CLINIC | Age: 29
End: 2024-07-16
Attending: NURSE PRACTITIONER
Payer: COMMERCIAL

## 2024-07-16 VITALS — WEIGHT: 194.8 LBS | DIASTOLIC BLOOD PRESSURE: 64 MMHG | SYSTOLIC BLOOD PRESSURE: 108 MMHG | BODY MASS INDEX: 33.44 KG/M2

## 2024-07-16 DIAGNOSIS — O09.299 HISTORY OF DELIVERY BY VACUUM EXTRACTION, CURRENTLY PREGNANT: ICD-10-CM

## 2024-07-16 DIAGNOSIS — Z3A.40 40 WEEKS GESTATION OF PREGNANCY: ICD-10-CM

## 2024-07-16 DIAGNOSIS — B95.1 POSITIVE GBS TEST: ICD-10-CM

## 2024-07-16 DIAGNOSIS — O09.93 SUPERVISION OF HIGH RISK PREGNANCY IN THIRD TRIMESTER: ICD-10-CM

## 2024-07-16 DIAGNOSIS — O43.199 MARGINAL INSERTION OF UMBILICAL CORD AFFECTING MANAGEMENT OF MOTHER: Primary | ICD-10-CM

## 2024-07-16 DIAGNOSIS — O43.199 MARGINAL INSERTION OF UMBILICAL CORD AFFECTING MANAGEMENT OF MOTHER: ICD-10-CM

## 2024-07-16 DIAGNOSIS — O09.299 HX OF MATERNAL LACERATION, 3RD DEGREE, CURRENTLY PREGNANT: ICD-10-CM

## 2024-07-16 PROCEDURE — 76819 FETAL BIOPHYS PROFIL W/O NST: CPT | Performed by: STUDENT IN AN ORGANIZED HEALTH CARE EDUCATION/TRAINING PROGRAM

## 2024-07-16 PROCEDURE — 99207 PR PRENATAL VISIT: CPT | Performed by: ADVANCED PRACTICE MIDWIFE

## 2024-07-18 ENCOUNTER — HOSPITAL ENCOUNTER (INPATIENT)
Facility: CLINIC | Age: 29
LOS: 1 days | Discharge: HOME | End: 2024-07-20
Attending: ADVANCED PRACTICE MIDWIFE | Admitting: ADVANCED PRACTICE MIDWIFE
Payer: COMMERCIAL

## 2024-07-18 PROBLEM — Z36.89 ENCOUNTER FOR TRIAGE IN PREGNANT PATIENT: Status: ACTIVE | Noted: 2024-07-18

## 2024-07-18 PROCEDURE — G0463 HOSPITAL OUTPT CLINIC VISIT: HCPCS

## 2024-07-18 RX ORDER — LIDOCAINE 40 MG/G
CREAM TOPICAL
Status: DISCONTINUED | OUTPATIENT
Start: 2024-07-18 | End: 2024-07-19 | Stop reason: HOSPADM

## 2024-07-19 ENCOUNTER — ANESTHESIA (OUTPATIENT)
Dept: OBGYN | Facility: CLINIC | Age: 29
End: 2024-07-19
Payer: COMMERCIAL

## 2024-07-19 ENCOUNTER — ANESTHESIA EVENT (OUTPATIENT)
Dept: OBGYN | Facility: CLINIC | Age: 29
End: 2024-07-19
Payer: COMMERCIAL

## 2024-07-19 LAB
ABO/RH(D): NORMAL
ALBUMIN MFR UR ELPH: 6.1 MG/DL
ALBUMIN SERPL BCG-MCNC: 3.1 G/DL (ref 3.5–5.2)
ALBUMIN SERPL BCG-MCNC: 3.5 G/DL (ref 3.5–5.2)
ALP SERPL-CCNC: 289 U/L (ref 40–150)
ALP SERPL-CCNC: 334 U/L (ref 40–150)
ALT SERPL W P-5'-P-CCNC: 9 U/L (ref 0–50)
ALT SERPL W P-5'-P-CCNC: 9 U/L (ref 0–50)
ANION GAP SERPL CALCULATED.3IONS-SCNC: 13 MMOL/L (ref 7–15)
ANION GAP SERPL CALCULATED.3IONS-SCNC: 9 MMOL/L (ref 7–15)
ANTIBODY SCREEN: NEGATIVE
AST SERPL W P-5'-P-CCNC: 17 U/L (ref 0–45)
AST SERPL W P-5'-P-CCNC: 22 U/L (ref 0–45)
BASOPHILS # BLD AUTO: 0 10E3/UL (ref 0–0.2)
BASOPHILS NFR BLD AUTO: 0 %
BILIRUB SERPL-MCNC: 0.2 MG/DL
BILIRUB SERPL-MCNC: 0.2 MG/DL
BUN SERPL-MCNC: 3.9 MG/DL (ref 6–20)
BUN SERPL-MCNC: 4.7 MG/DL (ref 6–20)
CALCIUM SERPL-MCNC: 8.7 MG/DL (ref 8.8–10.4)
CALCIUM SERPL-MCNC: 9 MG/DL (ref 8.8–10.4)
CHLORIDE SERPL-SCNC: 106 MMOL/L (ref 98–107)
CHLORIDE SERPL-SCNC: 107 MMOL/L (ref 98–107)
CREAT SERPL-MCNC: 0.65 MG/DL (ref 0.51–0.95)
CREAT SERPL-MCNC: 0.66 MG/DL (ref 0.51–0.95)
CREAT UR-MCNC: 26.5 MG/DL
EGFRCR SERPLBLD CKD-EPI 2021: >90 ML/MIN/1.73M2
EGFRCR SERPLBLD CKD-EPI 2021: >90 ML/MIN/1.73M2
EOSINOPHIL # BLD AUTO: 0.1 10E3/UL (ref 0–0.7)
EOSINOPHIL NFR BLD AUTO: 1 %
ERYTHROCYTE [DISTWIDTH] IN BLOOD BY AUTOMATED COUNT: 14.1 % (ref 10–15)
ERYTHROCYTE [DISTWIDTH] IN BLOOD BY AUTOMATED COUNT: 14.1 % (ref 10–15)
GLUCOSE SERPL-MCNC: 85 MG/DL (ref 70–99)
GLUCOSE SERPL-MCNC: 91 MG/DL (ref 70–99)
HCO3 SERPL-SCNC: 17 MMOL/L (ref 22–29)
HCO3 SERPL-SCNC: 21 MMOL/L (ref 22–29)
HCT VFR BLD AUTO: 31.2 % (ref 35–47)
HCT VFR BLD AUTO: 34.1 % (ref 35–47)
HGB BLD-MCNC: 10.5 G/DL (ref 11.7–15.7)
HGB BLD-MCNC: 11.6 G/DL (ref 11.7–15.7)
IMM GRANULOCYTES # BLD: 0.1 10E3/UL
IMM GRANULOCYTES NFR BLD: 1 %
LYMPHOCYTES # BLD AUTO: 1.8 10E3/UL (ref 0.8–5.3)
LYMPHOCYTES NFR BLD AUTO: 17 %
MCH RBC QN AUTO: 28.8 PG (ref 26.5–33)
MCH RBC QN AUTO: 29.1 PG (ref 26.5–33)
MCHC RBC AUTO-ENTMCNC: 33.7 G/DL (ref 31.5–36.5)
MCHC RBC AUTO-ENTMCNC: 34 G/DL (ref 31.5–36.5)
MCV RBC AUTO: 86 FL (ref 78–100)
MCV RBC AUTO: 86 FL (ref 78–100)
MONOCYTES # BLD AUTO: 0.8 10E3/UL (ref 0–1.3)
MONOCYTES NFR BLD AUTO: 8 %
NEUTROPHILS # BLD AUTO: 7.4 10E3/UL (ref 1.6–8.3)
NEUTROPHILS NFR BLD AUTO: 74 %
NRBC # BLD AUTO: 0 10E3/UL
NRBC BLD AUTO-RTO: 0 /100
PLATELET # BLD AUTO: 149 10E3/UL (ref 150–450)
PLATELET # BLD AUTO: 155 10E3/UL (ref 150–450)
POTASSIUM SERPL-SCNC: 4.1 MMOL/L (ref 3.4–5.3)
POTASSIUM SERPL-SCNC: 4.5 MMOL/L (ref 3.4–5.3)
PROT SERPL-MCNC: 5.9 G/DL (ref 6.4–8.3)
PROT SERPL-MCNC: 6.4 G/DL (ref 6.4–8.3)
PROT/CREAT 24H UR: 0.23 MG/MG CR (ref 0–0.2)
RBC # BLD AUTO: 3.64 10E6/UL (ref 3.8–5.2)
RBC # BLD AUTO: 3.98 10E6/UL (ref 3.8–5.2)
SODIUM SERPL-SCNC: 136 MMOL/L (ref 135–145)
SODIUM SERPL-SCNC: 137 MMOL/L (ref 135–145)
SPECIMEN EXPIRATION DATE: NORMAL
T PALLIDUM AB SER QL: NONREACTIVE
WBC # BLD AUTO: 10.1 10E3/UL (ref 4–11)
WBC # BLD AUTO: 7.3 10E3/UL (ref 4–11)

## 2024-07-19 PROCEDURE — 82040 ASSAY OF SERUM ALBUMIN: CPT | Performed by: ADVANCED PRACTICE MIDWIFE

## 2024-07-19 PROCEDURE — 0KQM0ZZ REPAIR PERINEUM MUSCLE, OPEN APPROACH: ICD-10-PCS | Performed by: ADVANCED PRACTICE MIDWIFE

## 2024-07-19 PROCEDURE — 250N000011 HC RX IP 250 OP 636: Performed by: ADVANCED PRACTICE MIDWIFE

## 2024-07-19 PROCEDURE — 258N000003 HC RX IP 258 OP 636: Performed by: ANESTHESIOLOGY

## 2024-07-19 PROCEDURE — 00HU33Z INSERTION OF INFUSION DEVICE INTO SPINAL CANAL, PERCUTANEOUS APPROACH: ICD-10-PCS | Performed by: ANESTHESIOLOGY

## 2024-07-19 PROCEDURE — 86900 BLOOD TYPING SEROLOGIC ABO: CPT | Performed by: ADVANCED PRACTICE MIDWIFE

## 2024-07-19 PROCEDURE — 250N000013 HC RX MED GY IP 250 OP 250 PS 637: Performed by: ADVANCED PRACTICE MIDWIFE

## 2024-07-19 PROCEDURE — 250N000011 HC RX IP 250 OP 636: Performed by: ANESTHESIOLOGY

## 2024-07-19 PROCEDURE — 84460 ALANINE AMINO (ALT) (SGPT): CPT | Performed by: ADVANCED PRACTICE MIDWIFE

## 2024-07-19 PROCEDURE — 370N000003 HC ANESTHESIA WARD SERVICE: Performed by: ANESTHESIOLOGY

## 2024-07-19 PROCEDURE — 85041 AUTOMATED RBC COUNT: CPT | Performed by: ADVANCED PRACTICE MIDWIFE

## 2024-07-19 PROCEDURE — 722N000001 HC LABOR CARE VAGINAL DELIVERY SINGLE

## 2024-07-19 PROCEDURE — 120N000012 HC R&B POSTPARTUM

## 2024-07-19 PROCEDURE — 86780 TREPONEMA PALLIDUM: CPT | Performed by: ADVANCED PRACTICE MIDWIFE

## 2024-07-19 PROCEDURE — 84156 ASSAY OF PROTEIN URINE: CPT | Performed by: ADVANCED PRACTICE MIDWIFE

## 2024-07-19 PROCEDURE — 59400 OBSTETRICAL CARE: CPT | Performed by: ANESTHESIOLOGY

## 2024-07-19 PROCEDURE — 85025 COMPLETE CBC W/AUTO DIFF WBC: CPT | Performed by: ADVANCED PRACTICE MIDWIFE

## 2024-07-19 PROCEDURE — 10907ZC DRAINAGE OF AMNIOTIC FLUID, THERAPEUTIC FROM PRODUCTS OF CONCEPTION, VIA NATURAL OR ARTIFICIAL OPENING: ICD-10-PCS | Performed by: ADVANCED PRACTICE MIDWIFE

## 2024-07-19 PROCEDURE — 84450 TRANSFERASE (AST) (SGOT): CPT | Performed by: ADVANCED PRACTICE MIDWIFE

## 2024-07-19 PROCEDURE — 36415 COLL VENOUS BLD VENIPUNCTURE: CPT | Performed by: ADVANCED PRACTICE MIDWIFE

## 2024-07-19 PROCEDURE — 250N000009 HC RX 250: Performed by: ADVANCED PRACTICE MIDWIFE

## 2024-07-19 PROCEDURE — G0463 HOSPITAL OUTPT CLINIC VISIT: HCPCS

## 2024-07-19 PROCEDURE — 3E0R3BZ INTRODUCTION OF ANESTHETIC AGENT INTO SPINAL CANAL, PERCUTANEOUS APPROACH: ICD-10-PCS | Performed by: ANESTHESIOLOGY

## 2024-07-19 PROCEDURE — 59400 OBSTETRICAL CARE: CPT | Performed by: ADVANCED PRACTICE MIDWIFE

## 2024-07-19 RX ORDER — NALOXONE HYDROCHLORIDE 0.4 MG/ML
0.4 INJECTION, SOLUTION INTRAMUSCULAR; INTRAVENOUS; SUBCUTANEOUS
Status: DISCONTINUED | OUTPATIENT
Start: 2024-07-19 | End: 2024-07-20 | Stop reason: HOSPADM

## 2024-07-19 RX ORDER — MISOPROSTOL 200 UG/1
400 TABLET ORAL
Status: DISCONTINUED | OUTPATIENT
Start: 2024-07-19 | End: 2024-07-20 | Stop reason: HOSPADM

## 2024-07-19 RX ORDER — MISOPROSTOL 200 UG/1
800 TABLET ORAL
Status: DISCONTINUED | OUTPATIENT
Start: 2024-07-19 | End: 2024-07-20 | Stop reason: HOSPADM

## 2024-07-19 RX ORDER — OXYTOCIN 10 [USP'U]/ML
10 INJECTION, SOLUTION INTRAMUSCULAR; INTRAVENOUS
Status: DISCONTINUED | OUTPATIENT
Start: 2024-07-19 | End: 2024-07-20 | Stop reason: HOSPADM

## 2024-07-19 RX ORDER — OXYTOCIN/0.9 % SODIUM CHLORIDE 30/500 ML
340 PLASTIC BAG, INJECTION (ML) INTRAVENOUS CONTINUOUS PRN
Status: DISCONTINUED | OUTPATIENT
Start: 2024-07-19 | End: 2024-07-20 | Stop reason: HOSPADM

## 2024-07-19 RX ORDER — LOPERAMIDE HCL 2 MG
4 CAPSULE ORAL
Status: DISCONTINUED | OUTPATIENT
Start: 2024-07-19 | End: 2024-07-20 | Stop reason: HOSPADM

## 2024-07-19 RX ORDER — METHYLERGONOVINE MALEATE 0.2 MG/ML
200 INJECTION INTRAVENOUS
Status: DISCONTINUED | OUTPATIENT
Start: 2024-07-19 | End: 2024-07-20 | Stop reason: HOSPADM

## 2024-07-19 RX ORDER — CARBOPROST TROMETHAMINE 250 UG/ML
250 INJECTION, SOLUTION INTRAMUSCULAR
Status: DISCONTINUED | OUTPATIENT
Start: 2024-07-19 | End: 2024-07-20 | Stop reason: HOSPADM

## 2024-07-19 RX ORDER — IBUPROFEN 400 MG/1
800 TABLET, FILM COATED ORAL
Status: COMPLETED | OUTPATIENT
Start: 2024-07-19 | End: 2024-07-19

## 2024-07-19 RX ORDER — ONDANSETRON 4 MG/1
4 TABLET, ORALLY DISINTEGRATING ORAL EVERY 6 HOURS PRN
Status: DISCONTINUED | OUTPATIENT
Start: 2024-07-19 | End: 2024-07-19 | Stop reason: HOSPADM

## 2024-07-19 RX ORDER — OXYTOCIN 10 [USP'U]/ML
10 INJECTION, SOLUTION INTRAMUSCULAR; INTRAVENOUS
Status: DISCONTINUED | OUTPATIENT
Start: 2024-07-19 | End: 2024-07-19 | Stop reason: HOSPADM

## 2024-07-19 RX ORDER — OXYTOCIN/0.9 % SODIUM CHLORIDE 30/500 ML
340 PLASTIC BAG, INJECTION (ML) INTRAVENOUS CONTINUOUS PRN
Status: DISCONTINUED | OUTPATIENT
Start: 2024-07-19 | End: 2024-07-19 | Stop reason: HOSPADM

## 2024-07-19 RX ORDER — LOPERAMIDE HCL 2 MG
2 CAPSULE ORAL
Status: DISCONTINUED | OUTPATIENT
Start: 2024-07-19 | End: 2024-07-19 | Stop reason: HOSPADM

## 2024-07-19 RX ORDER — LOPERAMIDE HCL 2 MG
4 CAPSULE ORAL
Status: DISCONTINUED | OUTPATIENT
Start: 2024-07-19 | End: 2024-07-19 | Stop reason: HOSPADM

## 2024-07-19 RX ORDER — NALOXONE HYDROCHLORIDE 0.4 MG/ML
0.2 INJECTION, SOLUTION INTRAMUSCULAR; INTRAVENOUS; SUBCUTANEOUS
Status: DISCONTINUED | OUTPATIENT
Start: 2024-07-19 | End: 2024-07-20 | Stop reason: HOSPADM

## 2024-07-19 RX ORDER — CARBOPROST TROMETHAMINE 250 UG/ML
250 INJECTION, SOLUTION INTRAMUSCULAR
Status: DISCONTINUED | OUTPATIENT
Start: 2024-07-19 | End: 2024-07-19 | Stop reason: HOSPADM

## 2024-07-19 RX ORDER — ONDANSETRON 4 MG/1
4 TABLET, ORALLY DISINTEGRATING ORAL EVERY 6 HOURS PRN
Status: DISCONTINUED | OUTPATIENT
Start: 2024-07-19 | End: 2024-07-20 | Stop reason: HOSPADM

## 2024-07-19 RX ORDER — PROCHLORPERAZINE MALEATE 5 MG
10 TABLET ORAL EVERY 6 HOURS PRN
Status: DISCONTINUED | OUTPATIENT
Start: 2024-07-19 | End: 2024-07-19 | Stop reason: HOSPADM

## 2024-07-19 RX ORDER — PENICILLIN G POTASSIUM 5000000 [IU]/1
5 INJECTION, POWDER, FOR SOLUTION INTRAMUSCULAR; INTRAVENOUS ONCE
Status: COMPLETED | OUTPATIENT
Start: 2024-07-19 | End: 2024-07-19

## 2024-07-19 RX ORDER — LOPERAMIDE HCL 2 MG
2 CAPSULE ORAL
Status: DISCONTINUED | OUTPATIENT
Start: 2024-07-19 | End: 2024-07-20 | Stop reason: HOSPADM

## 2024-07-19 RX ORDER — FENTANYL CITRATE 50 UG/ML
100 INJECTION, SOLUTION INTRAMUSCULAR; INTRAVENOUS
Status: DISCONTINUED | OUTPATIENT
Start: 2024-07-19 | End: 2024-07-19 | Stop reason: HOSPADM

## 2024-07-19 RX ORDER — TRANEXAMIC ACID 10 MG/ML
1 INJECTION, SOLUTION INTRAVENOUS EVERY 30 MIN PRN
Status: DISCONTINUED | OUTPATIENT
Start: 2024-07-19 | End: 2024-07-19 | Stop reason: HOSPADM

## 2024-07-19 RX ORDER — TRANEXAMIC ACID 10 MG/ML
1 INJECTION, SOLUTION INTRAVENOUS EVERY 30 MIN PRN
Status: DISCONTINUED | OUTPATIENT
Start: 2024-07-19 | End: 2024-07-20 | Stop reason: HOSPADM

## 2024-07-19 RX ORDER — KETOROLAC TROMETHAMINE 30 MG/ML
30 INJECTION, SOLUTION INTRAMUSCULAR; INTRAVENOUS
Status: COMPLETED | OUTPATIENT
Start: 2024-07-19 | End: 2024-07-19

## 2024-07-19 RX ORDER — CITRIC ACID/SODIUM CITRATE 334-500MG
30 SOLUTION, ORAL ORAL
Status: DISCONTINUED | OUTPATIENT
Start: 2024-07-19 | End: 2024-07-19 | Stop reason: HOSPADM

## 2024-07-19 RX ORDER — NALBUPHINE HYDROCHLORIDE 20 MG/ML
2.5-5 INJECTION, SOLUTION INTRAMUSCULAR; INTRAVENOUS; SUBCUTANEOUS EVERY 6 HOURS PRN
Status: DISCONTINUED | OUTPATIENT
Start: 2024-07-19 | End: 2024-07-20 | Stop reason: HOSPADM

## 2024-07-19 RX ORDER — ACETAMINOPHEN 325 MG/1
650 TABLET ORAL EVERY 4 HOURS PRN
Status: DISCONTINUED | OUTPATIENT
Start: 2024-07-19 | End: 2024-07-19 | Stop reason: HOSPADM

## 2024-07-19 RX ORDER — SODIUM CHLORIDE, SODIUM LACTATE, POTASSIUM CHLORIDE, CALCIUM CHLORIDE 600; 310; 30; 20 MG/100ML; MG/100ML; MG/100ML; MG/100ML
10-125 INJECTION, SOLUTION INTRAVENOUS CONTINUOUS
Status: DISCONTINUED | OUTPATIENT
Start: 2024-07-19 | End: 2024-07-20 | Stop reason: HOSPADM

## 2024-07-19 RX ORDER — METOCLOPRAMIDE 10 MG/1
10 TABLET ORAL EVERY 6 HOURS PRN
Status: DISCONTINUED | OUTPATIENT
Start: 2024-07-19 | End: 2024-07-19 | Stop reason: HOSPADM

## 2024-07-19 RX ORDER — FENTANYL CITRATE-0.9 % NACL/PF 10 MCG/ML
100 PLASTIC BAG, INJECTION (ML) INTRAVENOUS EVERY 5 MIN PRN
Status: DISCONTINUED | OUTPATIENT
Start: 2024-07-19 | End: 2024-07-19 | Stop reason: HOSPADM

## 2024-07-19 RX ORDER — BISACODYL 10 MG
10 SUPPOSITORY, RECTAL RECTAL DAILY PRN
Status: DISCONTINUED | OUTPATIENT
Start: 2024-07-19 | End: 2024-07-20 | Stop reason: HOSPADM

## 2024-07-19 RX ORDER — ACETAMINOPHEN 325 MG/1
650 TABLET ORAL EVERY 4 HOURS PRN
Status: DISCONTINUED | OUTPATIENT
Start: 2024-07-19 | End: 2024-07-20 | Stop reason: HOSPADM

## 2024-07-19 RX ORDER — ONDANSETRON 2 MG/ML
4 INJECTION INTRAMUSCULAR; INTRAVENOUS EVERY 6 HOURS PRN
Status: DISCONTINUED | OUTPATIENT
Start: 2024-07-19 | End: 2024-07-19 | Stop reason: HOSPADM

## 2024-07-19 RX ORDER — NALOXONE HYDROCHLORIDE 0.4 MG/ML
0.4 INJECTION, SOLUTION INTRAMUSCULAR; INTRAVENOUS; SUBCUTANEOUS
Status: DISCONTINUED | OUTPATIENT
Start: 2024-07-19 | End: 2024-07-19 | Stop reason: HOSPADM

## 2024-07-19 RX ORDER — MISOPROSTOL 200 UG/1
400 TABLET ORAL
Status: DISCONTINUED | OUTPATIENT
Start: 2024-07-19 | End: 2024-07-19 | Stop reason: HOSPADM

## 2024-07-19 RX ORDER — NALOXONE HYDROCHLORIDE 0.4 MG/ML
0.2 INJECTION, SOLUTION INTRAMUSCULAR; INTRAVENOUS; SUBCUTANEOUS
Status: DISCONTINUED | OUTPATIENT
Start: 2024-07-19 | End: 2024-07-19 | Stop reason: HOSPADM

## 2024-07-19 RX ORDER — METHYLERGONOVINE MALEATE 0.2 MG/ML
200 INJECTION INTRAVENOUS
Status: DISCONTINUED | OUTPATIENT
Start: 2024-07-19 | End: 2024-07-19 | Stop reason: HOSPADM

## 2024-07-19 RX ORDER — OXYTOCIN/0.9 % SODIUM CHLORIDE 30/500 ML
100-340 PLASTIC BAG, INJECTION (ML) INTRAVENOUS CONTINUOUS PRN
Status: DISCONTINUED | OUTPATIENT
Start: 2024-07-19 | End: 2024-07-20 | Stop reason: HOSPADM

## 2024-07-19 RX ORDER — PROCHLORPERAZINE 25 MG
25 SUPPOSITORY, RECTAL RECTAL EVERY 12 HOURS PRN
Status: DISCONTINUED | OUTPATIENT
Start: 2024-07-19 | End: 2024-07-19 | Stop reason: HOSPADM

## 2024-07-19 RX ORDER — MODIFIED LANOLIN
OINTMENT (GRAM) TOPICAL
Status: DISCONTINUED | OUTPATIENT
Start: 2024-07-19 | End: 2024-07-20 | Stop reason: HOSPADM

## 2024-07-19 RX ORDER — DOCUSATE SODIUM 100 MG/1
100 CAPSULE, LIQUID FILLED ORAL DAILY
Status: DISCONTINUED | OUTPATIENT
Start: 2024-07-19 | End: 2024-07-20 | Stop reason: HOSPADM

## 2024-07-19 RX ORDER — HYDROCORTISONE 25 MG/G
CREAM TOPICAL 3 TIMES DAILY PRN
Status: DISCONTINUED | OUTPATIENT
Start: 2024-07-19 | End: 2024-07-20 | Stop reason: HOSPADM

## 2024-07-19 RX ORDER — OXYCODONE HYDROCHLORIDE 5 MG/1
5 TABLET ORAL EVERY 6 HOURS PRN
Status: DISCONTINUED | OUTPATIENT
Start: 2024-07-19 | End: 2024-07-20 | Stop reason: HOSPADM

## 2024-07-19 RX ORDER — METOCLOPRAMIDE HYDROCHLORIDE 5 MG/ML
10 INJECTION INTRAMUSCULAR; INTRAVENOUS EVERY 6 HOURS PRN
Status: DISCONTINUED | OUTPATIENT
Start: 2024-07-19 | End: 2024-07-19 | Stop reason: HOSPADM

## 2024-07-19 RX ORDER — IBUPROFEN 400 MG/1
800 TABLET, FILM COATED ORAL EVERY 6 HOURS PRN
Status: DISCONTINUED | OUTPATIENT
Start: 2024-07-19 | End: 2024-07-20 | Stop reason: HOSPADM

## 2024-07-19 RX ORDER — MISOPROSTOL 200 UG/1
800 TABLET ORAL
Status: DISCONTINUED | OUTPATIENT
Start: 2024-07-19 | End: 2024-07-19 | Stop reason: HOSPADM

## 2024-07-19 RX ORDER — LIDOCAINE 40 MG/G
CREAM TOPICAL
Status: DISCONTINUED | OUTPATIENT
Start: 2024-07-19 | End: 2024-07-19 | Stop reason: HOSPADM

## 2024-07-19 RX ORDER — PENICILLIN G 3000000 [IU]/50ML
3 INJECTION, SOLUTION INTRAVENOUS EVERY 4 HOURS
Status: DISCONTINUED | OUTPATIENT
Start: 2024-07-19 | End: 2024-07-19 | Stop reason: HOSPADM

## 2024-07-19 RX ADMIN — PENICILLIN G 3 MILLION UNITS: 3000000 INJECTION, SOLUTION INTRAVENOUS at 04:38

## 2024-07-19 RX ADMIN — Medication: at 01:55

## 2024-07-19 RX ADMIN — ACETAMINOPHEN 650 MG: 325 TABLET, FILM COATED ORAL at 17:41

## 2024-07-19 RX ADMIN — OXYCODONE HYDROCHLORIDE 5 MG: 5 TABLET ORAL at 18:45

## 2024-07-19 RX ADMIN — SODIUM CHLORIDE, POTASSIUM CHLORIDE, SODIUM LACTATE AND CALCIUM CHLORIDE 250 ML: 600; 310; 30; 20 INJECTION, SOLUTION INTRAVENOUS at 02:25

## 2024-07-19 RX ADMIN — PENICILLIN G POTASSIUM 5 MILLION UNITS: 5000000 POWDER, FOR SOLUTION INTRAMUSCULAR; INTRAPLEURAL; INTRATHECAL; INTRAVENOUS at 01:18

## 2024-07-19 RX ADMIN — ONDANSETRON 4 MG: 4 TABLET, ORALLY DISINTEGRATING ORAL at 09:37

## 2024-07-19 RX ADMIN — Medication 340 ML/HR: at 05:51

## 2024-07-19 RX ADMIN — ACETAMINOPHEN 650 MG: 325 TABLET, FILM COATED ORAL at 04:55

## 2024-07-19 RX ADMIN — DOCUSATE SODIUM 100 MG: 100 CAPSULE, LIQUID FILLED ORAL at 09:46

## 2024-07-19 RX ADMIN — ACETAMINOPHEN 650 MG: 325 TABLET, FILM COATED ORAL at 13:33

## 2024-07-19 RX ADMIN — IBUPROFEN 800 MG: 400 TABLET, FILM COATED ORAL at 23:15

## 2024-07-19 RX ADMIN — IBUPROFEN 800 MG: 400 TABLET, FILM COATED ORAL at 09:06

## 2024-07-19 RX ADMIN — IBUPROFEN 800 MG: 400 TABLET, FILM COATED ORAL at 15:57

## 2024-07-19 RX ADMIN — ACETAMINOPHEN 650 MG: 325 TABLET, FILM COATED ORAL at 21:43

## 2024-07-19 ASSESSMENT — ACTIVITIES OF DAILY LIVING (ADL)
ADLS_ACUITY_SCORE: 25
ADLS_ACUITY_SCORE: 18
ADLS_ACUITY_SCORE: 25
ADLS_ACUITY_SCORE: 18
ADLS_ACUITY_SCORE: 25
ADLS_ACUITY_SCORE: 18
ADLS_ACUITY_SCORE: 18
ADLS_ACUITY_SCORE: 25
ADLS_ACUITY_SCORE: 18
ADLS_ACUITY_SCORE: 25
ADLS_ACUITY_SCORE: 25
ADLS_ACUITY_SCORE: 18
ADLS_ACUITY_SCORE: 25
ADLS_ACUITY_SCORE: 25
ADLS_ACUITY_SCORE: 18
ADLS_ACUITY_SCORE: 25
ADLS_ACUITY_SCORE: 25
ADLS_ACUITY_SCORE: 18
ADLS_ACUITY_SCORE: 18
ADLS_ACUITY_SCORE: 25

## 2024-07-19 NOTE — PLAN OF CARE
Delivery of viable infant male at 0544, APGARS 8/9. Nicu present for meconium, sent away as infant was vigorously crying. Infant and mother doing well, QBL pending. See provider note and delivery summary for additional details.

## 2024-07-19 NOTE — PROGRESS NOTES
1342:  Notified by RN that Chloe was feeling dizzy when getting up to bathroom. Unable to void at that time. After resting in bed she was still feeling dizzy. VS stable and vaginal bleeding WNL. She has been resting, but hasn't slept since before delivery.    Ordered CBC. Encouraged sleep.    1511:  Hgb 10.5 and Chloe was still not able to urinate. Ordered reyna catheter placement and removal at  0700 on 7/20/24. If vitals are abnormal or dizziness or other symptoms worsen, I will be notified. Hgb in morning.     Tamie Mariee, DNP, APRN, CNM

## 2024-07-19 NOTE — PLAN OF CARE
RN attempting to contact  services, patient reports ctx pain is improving, Burke ANAYA at bs to assess for redose. SVE 5+, unable to get full exam due to patient pain and bulging bag. Bebe planning to come to bedside in 1 hr, 1st dose of PCN in.

## 2024-07-19 NOTE — PLAN OF CARE
Goal Outcome Evaluation:      Plan of Care Reviewed With: patient, sibling    Overall Patient Progress: improvingOverall Patient Progress: improving     Vital signs stable. Postpartum assessment WDL. Patient continues to be dizzy, hgb 10.5. Unable to void, reyna placed until AM. Pain controlled with tylenol and ibuprofen. Patient voiding without difficulty. Patient desires to pump and bottle, pump in room. Patient and infant bonding well. Will continue with current plan of care.

## 2024-07-19 NOTE — PROGRESS NOTES
"CNM PROGRESS NOTE    SUBJECTIVE:  Chloe is comfortable with her epidural. Denies feeling pressure or urge to push. Chloe reports a mild HA, was given Tylenol. Denies other s/sx of preeclampsia. Her sister is at the bedside for support     OBJECTIVE:  /72   Temp 98.3  F (36.8  C) (Temporal)   Resp 16   Ht 1.626 m (5' 4\")   Wt 88 kg (194 lb)   LMP 10/20/2023   SpO2 100%   BMI 33.30 kg/m      Fetal heart tones: Baseline 145   Variability: moderate  Accelerations: present  Decelerations: occ late decelerations     Contractions: Pt is zachariah every 2.5-5 minutes, lasting 40-70 seconds and palpates moderate    Cervix: 10/ 100% / 0, Vtx  ROM: AROM thick mec @0518    Pitocin- none  Antibiotics- PCN x2 doses    ASSESSMENT:  IUP @ 40w3d   GBS- positive, adequately treated with PCN  Elevated BP x3, not >4 hrs apart   Marginal cord insertion   Rh negative  Gestational thrombocytopenia (platelets 149 on admission)   Hx VAVD  Hx 3rd degree     PLAN:   Baseline preeclampsia labs ordered   Plan to labor down x30 minutes given no urge to push currently   Pain medication - comfortable with epidural   Prophylactic antibiotic for + GBS status  NICU team at delivery d/t meconium stained fluid   Anticipate     INA Claudio, GUMAROM    "

## 2024-07-19 NOTE — PLAN OF CARE
Patient arrived tor 232 ambulatory, oriented to room. Pt's sister at bedside, patient now requesting epidural. Bolus started, Bebe CNM notified. RN to update CNM with SVE after epidural. Patient to be continuous monitoring due to epidural, all questions and concerns addressed at this time.

## 2024-07-19 NOTE — ANESTHESIA PREPROCEDURE EVALUATION
Anesthesia Pre-Procedure Evaluation    Patient: Chloe Cleaning   MRN: [id number] : 1995        Procedure :           Past Medical History:   Diagnosis Date    Conjunctival melanosis, bilateral     Dry eye     Keratitis     Limbal stem cell deficiency     Microcytic anemia     Migraines     Pannus (corneal), bilateral     Vacuum-assisted vaginal delivery       Past Surgical History:   Procedure Laterality Date    REPAIR RETRACTION LID Left 2017    Procedure: REPAIR RETRACTION LID;  Left Lower Eyelid Retraction Repair with Acellular Dermis Graft and Temporary Tarsorrhaphy, Blunt Suture;  Surgeon: Jessi Chapman MD;  Location: [facility] OR    REPAIR RETRACTION LID Right 2017    Procedure: REPAIR RETRACTION LID;  Right Lower Eyelid Retraction Repair with Acellular Dermis Graft and Temporary Tarsorrhaphy;  Surgeon: Jessi Chapman MD;  Location: [facility] OR    TARSORRHAPHY Left 2017    Procedure: TARSORRHAPHY;;  Surgeon: Jessi Chapman MD;  Location: [facility] OR    TARSORRHAPHY Right 2017    Procedure: TARSORRHAPHY;;  Surgeon: Jessi Chapman MD;  Location: [facility] OR      No Known Allergies   Social History     Tobacco Use    Smoking status: Never    Smokeless tobacco: Never   Substance Use Topics    Alcohol use: No     Alcohol/week: 0.0 standard drinks of alcohol      Wt Readings from Last 1 Encounters:   24 88 kg (194 lb)        Anesthesia Evaluation            ROS/MED HX  ENT/Pulmonary:    (-) asthma   Neurologic:  - neg neurologic ROS     Cardiovascular:    (-) PIH   METS/Exercise Tolerance:     Hematologic:     (+)  no anticoagulation therapy, no coagulopathy,             Musculoskeletal:       GI/Hepatic:     (+) GERD,                   Renal/Genitourinary:       Endo:       Psychiatric/Substance Use:       Infectious Disease:       Malignancy:       Other:            Physical Exam    Airway        Mallampati: II   TM distance: > 3 FB   Neck ROM: full     Respiratory Devices and Support      "    Dental  no notable dental history         Cardiovascular   cardiovascular exam normal          Pulmonary   pulmonary exam normal                OUTSIDE LABS:  CBC:   Lab Results   Component Value Date    WBC 7.3 07/19/2024    WBC 8.4 06/28/2024    HGB 11.6 (L) 07/19/2024    HGB 11.1 (L) 06/28/2024    HCT 34.1 (L) 07/19/2024    HCT 32.7 (L) 06/28/2024     (L) 07/19/2024     06/28/2024     BMP:   Lab Results   Component Value Date     06/28/2024     05/30/2018    POTASSIUM 3.6 06/28/2024    POTASSIUM 4.0 05/30/2018    CHLORIDE 104 06/28/2024    CHLORIDE 109 05/30/2018    CO2 19 (L) 06/28/2024    CO2 22 05/30/2018    BUN 5.9 (L) 06/28/2024    BUN 9 05/30/2018    CR 0.58 06/28/2024    CR 0.63 05/30/2018    GLC 95 06/28/2024    GLC 88 05/30/2018     COAGS: No results found for: \"PTT\", \"INR\", \"FIBR\"  POC:   Lab Results   Component Value Date    HCG Negative 11/19/2017    HCGS Negative 05/30/2018     HEPATIC:   Lab Results   Component Value Date    ALBUMIN 3.9 05/30/2018    PROTTOTAL 8.0 05/30/2018    ALT 12 05/30/2018    AST 9 05/30/2018    ALKPHOS 62 05/30/2018    BILITOTAL 0.4 05/30/2018     OTHER:   Lab Results   Component Value Date    LACT 1.2 07/14/2016    MARGARITA 9.3 06/28/2024    LIPASE 74 05/30/2018    TSH 0.94 09/25/2019    SED 31 (H) 09/03/2015       Anesthesia Plan    ASA Status:  2       Anesthesia Type: Epidural.              Consents    Anesthesia Plan(s) and associated risks, benefits, and realistic alternatives discussed. Questions answered and patient/representative(s) expressed understanding.     - Discussed:     - Discussed with:  Patient            Postoperative Care            Comments:    Other Comments: Orders to manage the epidural infusion have been entered, and through coordination with the nurse, we will continute to manage and monitor the patient's labor epidural.  We will continuously be available to adjust as needed thruout the entire L&D process.            Juan " QUIANA Turk MD    I have reviewed the pertinent notes and labs in the chart from the past 30 days and (re)examined the patient.  Any updates or changes from those notes are reflected in this note.     # Hyponatremia: Lowest Na = 135 mmol/L in last 30 days, will monitor as appropriate

## 2024-07-19 NOTE — PROVIDER NOTIFICATION
07/19/24 1345   Provider Notification   Provider Name/Title Kodi AQUINO   Method of Notification Electronic Page   Request Evaluate-Remote   Notification Reason Lab Results     Kodi AQUINO notified that patient is feeling dizzy in bed, TORB for CBC and to notify if Hgb < 10. Encouraged patient to sleep.

## 2024-07-19 NOTE — PROVIDER NOTIFICATION
07/19/24 1714   Provider Notification   Provider Name/Title Kodi AQUINO   Method of Notification Electronic Page   Request Evaluate-Remote   Notification Reason Status Update     Patient complaining of 10/10 pain, she describes it as a sharp throbbing pain from abdomen to feet. Asking for oxycodone order or any further recommendations. CNM will place order for one time dose of oxycodone, call CNM if pain has not improved in 1 hour after giving the oxycodone.

## 2024-07-19 NOTE — PROVIDER NOTIFICATION
07/19/24 1510   Provider Notification   Provider Name/Title Kodi AQUINO   Method of Notification Electronic Page   Request Evaluate-Remote   Notification Reason Status Update     Kodi AQUINO updated that patient was still not able to void and continues to be dizzy, hgb was 10.5. TORB to place reyna catheter until 0700. Redraw hgb in AM.

## 2024-07-19 NOTE — L&D DELIVERY NOTE
OB Vaginal Delivery Note    Chloe Cleaning MRN# [id number]   Age: 28 year old YOB: 1995     Chloe began feeling increased rectal pressure shortly after AROM. Room set up for delivery, [place]an  utilized via phone on speaker. NICU called to attend delivery d/t meconium stained fluid. Chloe pushed with excellent maternal effort over two contractions to an  of viable infant male at 0545. Loose nuchal cord, infant was delivered through. Baby placed on maternal abdomen, good tone and lusty cry. NICU team dismissed. Cord clamped and cut by Chloe sister at 2 minutes of life. APGARS 8 and 9. AMTSL with IV pitocin and gentle cord traction. Placenta delivered spontaneously and intact, trailing membranes noted that were teased out with ring forceps. 2nd degree perineal laceration repaired with 3-0 vicryl on CT-1 in usual fashion. QBL pending at time of note. Mother and baby in stable condition.     GA: 40w3d  GP:   Labor Complications: None   EBL:   mL  Delivery QBL:    Delivery Type: Vaginal, Spontaneous   ROM to Delivery Time: (Delivered) Minutes: 27   Weight:     1 Minute 5 Minute 10 Minute   Apgar Totals: 8   9        SUKHDEV AHN;JULIA COLVIN     Delivery Details:  Chloe Cleaning, a 28 year old  female delivered a viable infant with apgars of 8  and 9 . Patient was fully dilated and pushing after   hours   minutes in active labor. Delivery was via vaginal, spontaneous  to a sterile field under epidural  anesthesia. Infant delivered in vertex  left  occiput  anterior  position. Anterior and posterior shoulders delivered without difficulty. The cord was clamped, cut twice and 3 vessels  were noted. Cord blood was obtained in routine fashion with the following disposition: lab .      Cord complications: nuchal   Placenta delivered at 2024  5:50 AM . Placental disposition was Hospital disposal . Fundal massage performed and fundus found to be firm.     Episiotomy: none     Perineum, vagina, cervix were inspected, and the following lacerations were noted:   Perineal lacerations: 2nd                Any lacerations were repaired in the usual fashion using 3-0 vicryl on CT-1.    Excellent hemostasis was noted. Needle count correct. Infant and patient in delivery room in good and stable condition.        Rodolfo Cleaning [[id number]]      Labor Events     labor?: No   steroids: None  Labor Type: Spontaneous  Predominate monitoring during 1st stage: continuous electronic fetal monitoring     Antibiotics received during labor?: Yes  Reason for Antibiotics: GBS  Antibiotics received for GBS: Penicillin  Antibiotics Given (GBS): Greater than 4 hours prior to delivery       Rupture date/time: 24 0518   Rupture type: Artificial Rupture of Membranes  Fluid color: Meconium  Fluid odor: Normal     Augmentation: AROM  Indications for augmentation: Ineffective Contraction Pattern       Delivery/Placenta Date and Time      Delivery Date: 24 Delivery Time:  5:45 AM   Placenta Date/Time: 2024  5:50 AM  Oxytocin given at the time of delivery: after delivery of placenta  Delivering clinician: Lorena Landrum CNM   Other personnel present at delivery:  Provider Role   Julissa De La Cruz, RN Delivery Nurse   Didi Robertson RN              Vaginal Counts       Initial count performed by 2 team members:  Two Team Members   Bebe De La Cruz RN         [place] Suture Needles Sponges (RETIRED) Instruments   Initial counts 2  5    Added to count  1     Relief counts       Final counts               Placed during labor Accounted for at the end of labor   FSE NA NA   IUPC NA NA   Cervidil NA NA                             Apgars    Living status: Living   1 Minute 5 Minute 10 Minute 15 Minute 20 Minute   Skin color: 0  1       Heart rate: 2  2       Reflex irritability: 2  2       Muscle tone: 2  2       Respiratory effort: 2  2       Total: 8  9       Apgars  assigned by: GABRIELA ROBERTSON RNC       Cord      Vessels: 3 Vessels    Cord Complications: Nuchal   Nuchal Intervention: delivered through         Nuchal cord description: loose nuchal cord         Cord Blood Disposition: Lab    Gases Sent?: No    Delayed cord clamping?: Yes    Cord Clamping Delay (seconds): 31-60 seconds    Stem cell collection?: No            Resuscitation    Methods: None   Care at Delivery: Spontaneous respiratory effort with quick change to pink color. To mother's abdomen at delivery. Infant dried, stimulated, and bulb suctioned. Infant placed near mother after cord clamped and cut. Unable to place skin to skin due to mother's apparel.    GABRIELA Robertson RNC         Labor Events and Shoulder Dystocia    Fetal Tracing Prior to Delivery: Category 2  Fetal Tracing Comments: intermittent variable and late decelerations, moderate variability  Shoulder dystocia present?: Neg       Delivery (Maternal) (Provider to Complete) ([id number])    Episiotomy: None  Perineal lacerations: 2nd Repaired?: Yes   Repair suture: 3-0 Vicryl  Number of repair packets: 1  Genital tract inspection done: Pos       Blood Loss  Mother: Chloe Cleaning #[id number]     Start of Mother's Information      Delivery Blood Loss  24 1745 - 24 0617      None                 End of Mother's Information  Mother: Chloe Cleaning #[id number]                Delivery - Provider to Complete ([id number])    Delivering clinician: Lorena Landrum CNM  Delivery Type (Choose the 1 that will go to the Birth History): Vaginal, Spontaneous                         Other personnel:  Provider Role   Julissa De La Cruz RN Delivery Nurse   Didi Robertson RN                     Placenta    Date/Time: 2024  5:50 AM  Removal: Spontaneous  Comments: 3VC cord, intact, Shankar, trailing membranes teased out with ring forceps  Disposition: Hospital disposal             Anesthesia    Method: Epidural  Cervical dilation at placement: 0-3                     Presentation and Position    Presentation: Vertex    Position: Left Occiput Anterior                     Assessment/Plan:   : routine cares, AM hemoglobin. Consider 24 hr discharge   Rh negative: rhogam as indicated   Formula feeding   GBS positive: adequately treated     INA Claudio CNM

## 2024-07-19 NOTE — PROGRESS NOTES
2310  Patient arrived to maternal assessment center via ambulance, with sister.  Patient reports reason for visit is labor contractions, pt ambulated to bed in MAC 3.    2315  Patient receives prenatal care with [facility] CNM's - history is per patient and care everywhere chart review.      G 2 P 1    40 weeks 2 days gestation    Prenatal record reviewed.        Verbal consent for EFM.    EFM applied for fetal well being with consent.    Uterine assessment completed, non-tender and palpates soft between contractions. Pt reports contractions every 4-5 mins at home since 1600      Patient reports fetal movement is present.  Patient denies backache, vaginal discharge, pelvic pressure, UTI symptoms, GI problems, bloody show, vaginal bleeding, edema, headache, visual disturbances, epigastric or URQ pain, and/or rupture of membranes.      Triage/Arrival assessment completed.    Cervical exam: 0.530/-3 (same as clinic on ).    SVE recheck at 0044: 3/70/-3    0046  Lorena AQUINO paged with return call received.  Update included but not limited to: Patients arrival, patient presents to maternal assessment center 3.  She is a .  40.2 gestation. Obstetrical history significant for VAVD.  Fetal heart tones with a 145 baseline, moderate variability, accelerations 15x15 present, no decelerations noted.  Contractions every 5 to 8 minutes.  Cervix: FT/30/-3, changed to 3cm.  Blood Pressures 110's/70. Patient is GBS positive.     Plan per provider is to admit to labor and delivery, utilizing intrapartum orderset.      May use acetaminophen, nitrous oxide, fentanyl, and/or epidural as needed/desired for labor pain relief.       May use intermittent auscultation as long as patient meets criteria.     Patient may be up ad chance.      Place IV access for continuous LR infusion for total IV fluids to be 125 ml/hr.  500 ml LR bolus times one for intrauterine resuscitation and/or uterine tachysystole.      Treat GBS  positive with penicillin.        0130 Patient to room 232 ambulatory with family and personal belongings.      0125  Report to Julissa PENA RN care transferred at this time.

## 2024-07-19 NOTE — H&P
[facility] Labor Admission History & Physical    Chloe Cleaning is a 28 year old  with an IUP at 40w3d  Jordanian; single  Partner/support Person: sister  Language Barrier: Jordanian  [facility]  Provider: CNBRUNA's    Chloe Cleaning is admitted to the Birthplace at [facility] on 2024 at 3:24 AM       History of present inllness/Chief Complaint:  Presents to L&D with spontaneous onset of labor. Contractions started at 1600 and have become increasingly stronger, more regular     Patient reports contractions are Regular           Baby active Yes  Membranes are bulging.  Bloody show No   Any changes with medical history since last prenatal visit No      Obstetrical history  Estimated Date of Delivery: 2024 determined by 12 wk US  Patient's last menstrual period was 10/20/2023.   Dating U/S: 2024    Fetal anatomic survey: Abnormal: marginal cord insertion  Placenta: anterior    PRENATAL COURSE  Prenatal care began at 12 wks gestation for a total of 8 prenatal visits.  Total wt gain 25; Body mass index is 33.3 kg/m .  Prenatal Blood Pressure: WNL  Prenatal course was complicated by 1.) hx VAVD, 2.) Hx 3rd degree, 3.) Marginal cord insertion, 4.) Rh Negative, 5.) GBS positive   Tdap: 24  Rhogam: 5/3/24    Patient Active Problem List   Diagnosis    Rh negative state in antepartum period    Vitamin D deficiency    Pregnancy, supervision, high-risk    Hx of maternal laceration, 3rd degree, currently pregnant    History of delivery by vacuum extraction, currently pregnant    Intractable chronic migraine without aura and without status migrainosus    Headache    Abnormal MRI of head    Marginal insertion of umbilical cord affecting management of mother    Positive GBS test    Encounter for triage in pregnant patient    Indication for care in labor or delivery       HISTORY  No Known Allergies  Past Medical History:   Diagnosis Date    Conjunctival melanosis, bilateral     Dry eye      Keratitis     Limbal stem cell deficiency     Microcytic anemia     Migraines     Pannus (corneal), bilateral     Vacuum-assisted vaginal delivery      Past Surgical History:   Procedure Laterality Date    REPAIR RETRACTION LID Left 2017    Procedure: REPAIR RETRACTION LID;  Left Lower Eyelid Retraction Repair with Acellular Dermis Graft and Temporary Tarsorrhaphy, Blunt Suture;  Surgeon: Jessi Chapman MD;  Location: [facility] OR    REPAIR RETRACTION LID Right 2017    Procedure: REPAIR RETRACTION LID;  Right Lower Eyelid Retraction Repair with Acellular Dermis Graft and Temporary Tarsorrhaphy;  Surgeon: Jessi Chapman MD;  Location: [facility] OR    TARSORRHAPHY Left 2017    Procedure: TARSORRHAPHY;;  Surgeon: Jessi Chapman MD;  Location: [facility] OR    TARSORRHAPHY Right 2017    Procedure: TARSORRHAPHY;;  Surgeon: Jessi Chapman MD;  Location: [facility] OR     Family History   Problem Relation Age of Onset    Glaucoma No family hx of     Macular Degeneration No family hx of      Social History     Tobacco Use    Smoking status: Never    Smokeless tobacco: Never   Substance Use Topics    Alcohol use: No     Alcohol/week: 0.0 standard drinks of alcohol     OB History    Para Term  AB Living   2 1 1 0 0 1   SAB IAB Ectopic Multiple Live Births   0 0 0 0 1      # Outcome Date GA Lbr Jonnathan/2nd Weight Sex Type Anes PTL Lv   2 Current            1 Term 20 39w6d 29:15 2.98 kg (6 lb 9.1 oz) M Vag-Vacuum EPI N RAMONA      Complications: Fetal Intolerance      Name: AWAL,MALE-HIEN      Apgar1: 8  Apgar5: 9       LABS:  Lab Results   Component Value Date    ABO A 2020    ABO A 2020    RH Neg 2020    RH Neg 2020    AS Negative 2024    HGB 11.6 (L) 2024    HEPBANG Nonreactive 2024    CHPCRT Negative 2024    GCPCRT Negative 2024       GBS Status: POSITIVE  Rubella: Immune    HIV: Non-Reactive   Platelets:  149  1hr GCT:  92    ROS   Pt is alert and  "oriented  Pt denies significant constitutional symptoms including fever and/or malaise.    Pt denies significant respiratory, cardiovacular, GI, or muscular/skeletal complaints.    Neuro: Denies HA and visual changes  Muscoloskeletal: Denies except for discomforts r/t pregnancy     PHYSICAL EXAM:  /65   Temp 98.4  F (36.9  C)   Ht 1.626 m (5' 4\")   Wt 88 kg (194 lb)   LMP 10/20/2023   SpO2 99%   BMI 33.30 kg/m    General appearance:  healthy, alert, active, and uncomfortable with contractions    Heart: RRR  Lungs: CTA bilaterally, normal respiratory effort  Abdomen: gravid, single vertex fetus, non-tender, EFW 7-7.5 lbs.   Legs:  trace edema     Contractions: Pt is zachariah every 4-6 minutes, lasting 60-90 seconds and palpates moderate    Fetal heart tones: Baseline 150   Variability: moderate   Accelerations: present  Decelerations: rare early decelerations       EFM: Category I     Cervix: 3/ 70% / Posterior/ soft/ -3, per RN exam in MAC  Bloody show: no  Membranes:  bulging     ASSESSMENT:  28 year old  with lerner IUP 40w3d in early labor  EFM: Category I   GBS positive and membranes intact  Marginal cord insertion   Rh negative  Gestational thrombocytopenia (platelets 149 on admission)   Hx VAVD  Hx 3rd degree     PLAN:  Routine CNM care  Labs ordered: CBC, syphilis screening, and type and screen  Teaching done r/t comfort measures, pain management options, and labor processes  Admit - see IP orders  Pain medication - comfortable with epidural   Prophylactic antibiotic for + GBS status. Will AROM if necessary once adequately treated for GBS  Anticipate     INA Claudio, CNM    "

## 2024-07-19 NOTE — PROGRESS NOTES
Ordered oxycodone for pain management. If pain does not decrease 1 hour after administration, then CNM will be contacted and further evaluation will be warranted.     Tamie Mariee, MED, APRN, CNM

## 2024-07-20 VITALS
WEIGHT: 194 LBS | OXYGEN SATURATION: 100 % | RESPIRATION RATE: 16 BRPM | SYSTOLIC BLOOD PRESSURE: 120 MMHG | HEIGHT: 64 IN | TEMPERATURE: 97.7 F | HEART RATE: 64 BPM | BODY MASS INDEX: 33.12 KG/M2 | DIASTOLIC BLOOD PRESSURE: 79 MMHG

## 2024-07-20 LAB — HGB BLD-MCNC: 10.5 G/DL (ref 11.7–15.7)

## 2024-07-20 PROCEDURE — 36415 COLL VENOUS BLD VENIPUNCTURE: CPT | Performed by: ADVANCED PRACTICE MIDWIFE

## 2024-07-20 PROCEDURE — 250N000013 HC RX MED GY IP 250 OP 250 PS 637: Performed by: ADVANCED PRACTICE MIDWIFE

## 2024-07-20 PROCEDURE — 85018 HEMOGLOBIN: CPT | Performed by: ADVANCED PRACTICE MIDWIFE

## 2024-07-20 RX ORDER — DOCUSATE SODIUM 100 MG/1
100 CAPSULE, LIQUID FILLED ORAL DAILY
Qty: 60 CAPSULE | Refills: 0 | Status: SHIPPED | OUTPATIENT
Start: 2024-07-21

## 2024-07-20 RX ORDER — ACETAMINOPHEN 325 MG/1
650 TABLET ORAL EVERY 4 HOURS PRN
Qty: 60 TABLET | Refills: 0 | Status: SHIPPED | OUTPATIENT
Start: 2024-07-20

## 2024-07-20 RX ORDER — IBUPROFEN 800 MG/1
800 TABLET, FILM COATED ORAL EVERY 6 HOURS PRN
Qty: 60 TABLET | Refills: 0 | Status: SHIPPED | OUTPATIENT
Start: 2024-07-20

## 2024-07-20 RX ADMIN — OXYCODONE HYDROCHLORIDE 5 MG: 5 TABLET ORAL at 02:32

## 2024-07-20 RX ADMIN — ACETAMINOPHEN 650 MG: 325 TABLET, FILM COATED ORAL at 02:30

## 2024-07-20 RX ADMIN — IBUPROFEN 800 MG: 400 TABLET, FILM COATED ORAL at 08:32

## 2024-07-20 RX ADMIN — IBUPROFEN 800 MG: 400 TABLET, FILM COATED ORAL at 06:16

## 2024-07-20 ASSESSMENT — ACTIVITIES OF DAILY LIVING (ADL)
ADLS_ACUITY_SCORE: 25
ADLS_ACUITY_SCORE: 20
ADLS_ACUITY_SCORE: 25
ADLS_ACUITY_SCORE: 20
ADLS_ACUITY_SCORE: 25
ADLS_ACUITY_SCORE: 20
ADLS_ACUITY_SCORE: 20

## 2024-07-20 NOTE — DISCHARGE SUMMARY
[facility] Discharge Summary    Chloe Cleaning MRN# [id number]   Age: 28 year old YOB: 1995     Date of Admission:  2024  Date of Discharge::  2024  Admitting Physician:  Lorena Landrum CNM  Discharge Physician:  INA Morin CNM     Home clinic: [facility]          Admission Diagnoses:   Encounter for triage in pregnant patient  Indication for care in labor or delivery          Discharge Diagnosis:     Normal spontaneous vaginal delivery  Lactating mother  Second degree perineal laceration          Procedures:     Procedure(s): No additional procedures performed              Medications Prior to Admission:   The patient was not taking any medications prior to admission          Discharge Medications:     Current Discharge Medication List        START taking these medications    Details   acetaminophen (TYLENOL) 325 MG tablet Take 2 tablets (650 mg) by mouth every 4 hours as needed for mild pain or fever  Qty: 60 tablet, Refills: 0    Associated Diagnoses:  (normal spontaneous vaginal delivery)      docusate sodium (COLACE) 100 MG capsule Take 1 capsule (100 mg) by mouth daily  Qty: 60 capsule, Refills: 0    Associated Diagnoses:  (normal spontaneous vaginal delivery)      ibuprofen (ADVIL/MOTRIN) 800 MG tablet Take 1 tablet (800 mg) by mouth every 6 hours as needed for other (cramping)  Qty: 60 tablet, Refills: 0    Associated Diagnoses:  (normal spontaneous vaginal delivery)           CONTINUE these medications which have NOT CHANGED    Details   famotidine (PEPCID) 20 MG tablet Take 1 tablet (20 mg) by mouth 2 times daily for 180 days  Qty: 180 tablet, Refills: 1    Associated Diagnoses: Heartburn      omeprazole (PRILOSEC) 20 MG DR capsule Take 1 capsule (20 mg) by mouth daily  Qty: 60 capsule, Refills: 1    Associated Diagnoses: Heartburn during pregnancy in third trimester      Prenatal Vit-Fe Fumarate-FA (PRENATAL  VITAMIN AND MINERAL) 28-0.8 MG TABS Take 1 tablet by mouth daily  Qty: 90 tablet, Refills: 3    Associated Diagnoses: Supervision of high risk pregnancy in third trimester                   Consultations:   No consultations were requested during this admission          Brief History of Labor:   Arrived in labor. Received IV PCN for GBS prophylaxis, achieved adequate treatment. Amniotomy completed. Pain managed per epidural. Delivered vigorous male infant at 05:45 AM on 7/19/24.           Hospital Course:   The patient's hospital course was notable for urine retention postpartum. Kan in use overnight, with adequate bladder emptying this morning.  On discharge, her pain was well controlled. Vaginal bleeding is similar to peak menstrual flow.  Voiding without difficulty.  Ambulating well and tolerating a normal diet.  No fever.  Breastfeeding well.  Infant is stable.  No bowel movement yet.  She was discharged on post-partum day #1.    Post-partum hemoglobin:   Hemoglobin   Date Value Ref Range Status   07/20/2024 10.5 (L) 11.7 - 15.7 g/dL Final   04/02/2020 11.5 (L) 11.7 - 15.7 g/dL Final             Discharge Instructions and Follow-Up:     Discharge diet: Regular   Discharge activity: Activity as tolerated   Discharge follow-up: Follow up with primary care provider in 2 and 6 weeks   Wound care: Drink plenty of fluids  Keep wound clean and dry  Sitz baths 1-2 times daily           Discharge Disposition:     Discharged to home      Attestation:  I have reviewed today's vital signs, notes, medications, labs and imaging.    INA Morin CNM

## 2024-07-20 NOTE — PROGRESS NOTES
"[facility] Progress Note: Postpartum Day #1    2024  9:20 AM    SUBJECTIVE:  Chloe is expressing desire to discharge home today. Expressing that pain is well controlled aside from intermittent sharp cramping pains with breastfeeding. Expresses disappointment that baby cannot be circumcised in the hospital.     Patient is stable and is tolerating acitivity well  Baby is rooming in  Complications since 2 hours post delivery: None  Pain is well controlled.  Patient is taking pain medications.  Breastfeeding status:initiated   Elimination:  She is voiding without difficulty this morning. Voiding large amounts to hat - 550ml out with PVR of 114mL.  She has not had a bowel movement  Desired contraception none,  [place] of [place] another 2-3 years  Denies heavy bleeding and passing large clots.    OBJECTIVE:  /79 (BP Location: Right leg, Patient Position: Sitting)   Pulse 64   Temp 97.7  F (36.5  C) (Oral)   Resp 16   Ht 1.626 m (5' 4\")   Wt 88 kg (194 lb)   LMP 10/20/2023   SpO2 100%   Breastfeeding Unknown   BMI 33.30 kg/m      Constitutional: healthy, alert, and no distress  Breasts: Currently breastfeeding  Fundus: Uterine fundus is firm, non-tender and at level of the umbilicus per RN charting  Perineum: Perineum is well approximated, minimal swelling, no drainage  Lochia: Lochia is appropriate for the duration of time since delivery.     ASSESSMENT:  PPD #1    Second degree perineal laceration  Lactating mother  Rh neg blood type; infant blood type A neg.    Hemoglobin   Date Value Ref Range Status   2024 10.5 (L) 11.7 - 15.7 g/dL Final   2020 11.5 (L) 11.7 - 15.7 g/dL Final     PLAN:  Continue routine care  Ambulation encouraged  Reviewed breastfeeding  Reviewed postpartum warning signs  Reviewed postpartum blues and postpartum depression warning signs  Reviewed postpartum care plan including MyChart check-in within one week, 2 week and 6 week " visits.  Plans abstinence for contraception postpartum ( abroad for next 2 yeasrs; reviewed recommendation for avoidance of pregnancy for at least 1 year.  Anticipated discharge today.    INA Morin CNM    This visit was completed with telephone Fijian .

## 2024-07-20 NOTE — PLAN OF CARE
VSS. Working on bottle sim every 2-3 hours or on demand. Fundus firm and midline with scant flow.  Pain controlled with tylenol and ibuprofen and oxy. Up ad chance. Kan removed at 0600-due to void. Sister at bedside involved in care. Education given and questions answered. Will continue with the current plan of care.

## 2024-07-20 NOTE — DISCHARGE INSTRUCTIONS
Warning Signs after Having a Baby    Keep this paper on your fridge or somewhere else where you can see it.    Call your provider if you have any of these symptoms up to 12 weeks after having your baby.    Thoughts of hurting yourself or your baby  Pain in your chest or trouble breathing  Severe headache not helped by pain medicine  Eyesight concerns (blurry vision, seeing spots or flashes of light, other changes to eyesight)  Fainting, shaking or other signs of a seizure    Call 9-1-1 if you feel that it is an emergency.     The symptoms below can happen to anyone after giving birth. They can be very serious. Call your provider if you have any of these warning signs.    My provider s phone number: _______________________    Losing too much blood (hemorrhage)    Call your provider if you soak through a pad in less than an hour or pass blood clots bigger than a golf ball. These may be signs that you are bleeding too much.    Blood clots in the legs or lungs    After you give birth, your body naturally clots its blood to help prevent blood loss. Sometimes this increased clotting can happen in other areas of the body, like the legs or lungs. This can block your blood flow and be very dangerous.     Call your provider if you:  Have a red, swollen spot on the back of your leg that is warm or painful when you touch it.   Are coughing up blood.     Infection    Call your provider if you have any of these symptoms:  Fever of 100.4 F (38 C) or higher.  Pain or redness around your stitches if you had an incision.   Any yellow, white, or green fluid coming from places where you had stitches or surgery.    Mood Problems (postpartum depression)    Many people feel sad or have mood changes after having a baby. But for some people, these mood swings are worse.     Call your provider right away if you feel so anxious or nervous that you can't care for yourself or your baby.    Preeclampsia (high blood pressure)    Even if you  "didn't have high blood pressure when you were pregnant, you are at risk for the high blood pressure disease called preeclampsia. This risk can last up to 12 weeks after giving birth.     Call your provider if you have:   Pain on your right side under your rib cage  Sudden swelling in the hands and face    Remember: You know your body. If something doesn't feel right, get medical help.     For informational purposes only. Not to replace the advice of your health care provider. Copyright 2020 [employer] [facility]. All rights reserved. Clinically reviewed by Genevieve March, RNC-OB, MSN. DocumentCloud 205233 - Rev .    Postpartum Care at Home With Your Baby: Care Instructions  Overview     After childbirth (postpartum period), your body goes through many changes as you recover. In these weeks after delivery, try to take good care of yourself. Get rest whenever you can and accept help from others.  It may take 4 to 6 weeks to feel like yourself again, and possibly longer if you had a  birth. You may feel sore or very tired as you recover. After delivery, you may continue to have contractions as the uterus returns to the size it was before your pregnancy. You will also have some vaginal bleeding. And you may have pain around the vagina as you heal. Several days after delivery you may also have pain and swelling in your breasts as they fill with milk. There are things you can do at home to help ease these discomforts.  After childbirth, it's common to feel emotional. You may feel irritable, cry easily, and feel happy one minute and sad the next. This is called the \"baby blues.\" Hormone changes are one cause of these emotional changes. These feelings usually get better within a couple of weeks. If they don't, talk to your doctor or midwife.  In the first couple of weeks after you give birth, your doctor or midwife may want to check in with you and make a plan for follow-up care. You will likely have a " complete postpartum visit in the first 3 months after delivery. At that time, your doctor or midwife will check on your recovery and see how you're doing. But if you have questions or concerns before then, you can always call your doctor or midwife.  Follow-up care is a key part of your treatment and safety. Be sure to make and go to all appointments, and call your doctor if you are having problems. It's also a good idea to know your test results and keep a list of the medicines you take.  How can you care for yourself at home?  Taking care of your body  Use pads instead of tampons for bleeding. After birth, you will have bloody vaginal discharge. You may also pass some blood clots that shouldn't be bigger than an egg. Over the next 6 weeks or so, your bleeding should decrease a little every day and slowly change to a pinkish and then whitish discharge.  For cramps or mild pain, try an over-the-counter pain medicine, such as acetaminophen (Tylenol) or ibuprofen (Advil, Motrin). Read and follow all instructions on the label.  To ease pain around the vagina or from hemorrhoids:  Put ice or a cold pack on the area for 10 to 20 minutes at a time. Put a thin cloth between the ice and your skin.  Try sitting in a few inches of warm water (sitz bath) when you can or after bowel movements.  Clean yourself with a gentle squeeze of warm water from a bottle instead of wiping with toilet paper.  Use witch hazel or hemorrhoid pads (such as Tucks).  Try using a cold compress for sore and swollen breasts. And wear a supportive bra that fits.  Ease constipation by drinking plenty of fluids and eating high-fiber foods. Ask your doctor or midwife about over-the-counter stool softeners.  Activity  Rest when you can.  Ask for help from family or friends when you need it.  If you can, have another adult in your home for at least 2 or 3 days after birth.  When you feel ready, try to get some exercise every day. For many people, walking  is a good choice. Don't do any heavy exercise until your doctor or midwife says it's okay.  Ask your doctor or midwife when it is okay to have vaginal sex.  If you don't want to get pregnant, talk to your doctor or midwife about birth control options. You can get pregnant even before your period returns. Also, you can get pregnant while you are breastfeeding.  Talk to your doctor or midwife if you want to get pregnant again. They can talk to you about when it is safe.  Emotional health  It's normal to have some sadness, anxiety, and mood swings after delivery. It may help to talk with a trusted friend or family member. You can also call the Maternal Mental Health Hotline at 9-492-[facility] (1-487.880.5862) for support. If these mood changes last more than a couple of weeks, talk to your doctor or midwife.  When should you call for help?  Share this information with your partner, family, or a friend. They can help you watch for warning signs.  Call 911  anytime you think you may need emergency care. For example, call if:    You feel you cannot stop from hurting yourself, your baby, or someone else.     You passed out (lost consciousness).     You have chest pain, are short of breath, or cough up blood.     You have a seizure.   Where to get help 24 hours a day, 7 days a week   If you or someone you know talks about suicide, self-harm, a mental health crisis, a substance use crisis, or any other kind of emotional distress, get help right away. You can:    Call the Suicide and Crisis Lifeline at 588.     Call 4-805-279-TALK (1-942.537.4199).     Text HOME to 169812 to access the Crisis Text Line.   Consider saving these numbers in your phone.  Go to [employer].org for more information or to chat online.  Call your doctor or midwife now or seek immediate medical care if:    You have signs of hemorrhage (too much bleeding), such as:  Heavy vaginal bleeding. This means that you are soaking through one or more pads in an  "hour. Or you pass blood clots bigger than an egg.  Feeling dizzy or lightheaded, or you feel like you may faint.  Feeling so tired or weak that you cannot do your usual activities.  A fast or irregular heartbeat.  New or worse belly pain.     You have signs of infection, such as:  A fever.  Increased pain, swelling, warmth, or redness from an incision or wound.  Frequent or painful urination or blood in your urine.  Vaginal discharge that smells bad.  New or worse belly pain.     You have symptoms of a blood clot in your leg (called a deep vein thrombosis), such as:  Pain in the calf, back of the knee, thigh, or groin.  Swelling in the leg or groin.  A color change on the leg or groin. The skin may be reddish or purplish, depending on your usual skin color.     You have signs of preeclampsia, such as:  Sudden swelling of your face, hands, or feet.  New vision problems (such as dimness, blurring, or seeing spots).  A severe headache.     You have signs of heart failure, such as:  New or increased shortness of breath.  New or worse swelling in your legs, ankles, or feet.  Sudden weight gain, such as more than 2 to 3 pounds in a day or 5 pounds in a week.  Feeling so tired or weak that you cannot do your usual activities.     You had spinal or epidural pain relief and have:  New or worse back pain.  Increased pain, swelling, warmth, or redness at the injection site.  Tingling, weakness, or numbness in your legs or groin.   Watch closely for changes in your health, and be sure to contact your doctor or midwife if:    Your vaginal bleeding isn't decreasing.     You feel sad, anxious, or hopeless for more than a few days.     You are having problems with your breasts or breastfeeding.   Where can you learn more?  Go to https://www.healthwise.net/patiented  Enter Z768 in the search box to learn more about \"Postpartum Care at Home With Your Baby: Care Instructions.\"  Current as of: July 10, 2023               Content " "Version: 14.0    8313-0281 [employer].   Care instructions adapted under license by your healthcare professional. If you have questions about a medical condition or this instruction, always ask your healthcare professional. [employer] disclaims any warranty or liability for your use of this information.  Postpartum Care at Home With Your Baby: Care Instructions  Overview     After childbirth (postpartum period), your body goes through many changes as you recover. In these weeks after delivery, try to take good care of yourself. Get rest whenever you can and accept help from others.  It may take 4 to 6 weeks to feel like yourself again, and possibly longer if you had a  birth. You may feel sore or very tired as you recover. After delivery, you may continue to have contractions as the uterus returns to the size it was before your pregnancy. You will also have some vaginal bleeding. And you may have pain around the vagina as you heal. Several days after delivery you may also have pain and swelling in your breasts as they fill with milk. There are things you can do at home to help ease these discomforts.  After childbirth, it's common to feel emotional. You may feel irritable, cry easily, and feel happy one minute and sad the next. This is called the \"baby blues.\" Hormone changes are one cause of these emotional changes. These feelings usually get better within a couple of weeks. If they don't, talk to your doctor or midwife.  In the first couple of weeks after you give birth, your doctor or midwife may want to check in with you and make a plan for follow-up care. You will likely have a complete postpartum visit in the first 3 months after delivery. At that time, your doctor or midwife will check on your recovery and see how you're doing. But if you have questions or concerns before then, you can always call your doctor or midwife.  Follow-up care is a key part of your treatment and " safety. Be sure to make and go to all appointments, and call your doctor if you are having problems. It's also a good idea to know your test results and keep a list of the medicines you take.  How can you care for yourself at home?  Taking care of your body  Use pads instead of tampons for bleeding. After birth, you will have bloody vaginal discharge. You may also pass some blood clots that shouldn't be bigger than an egg. Over the next 6 weeks or so, your bleeding should decrease a little every day and slowly change to a pinkish and then whitish discharge.  For cramps or mild pain, try an over-the-counter pain medicine, such as acetaminophen (Tylenol) or ibuprofen (Advil, Motrin). Read and follow all instructions on the label.  To ease pain around the vagina or from hemorrhoids:  Put ice or a cold pack on the area for 10 to 20 minutes at a time. Put a thin cloth between the ice and your skin.  Try sitting in a few inches of warm water (sitz bath) when you can or after bowel movements.  Clean yourself with a gentle squeeze of warm water from a bottle instead of wiping with toilet paper.  Use witch hazel or hemorrhoid pads (such as Tucks).  Try using a cold compress for sore and swollen breasts. And wear a supportive bra that fits.  Ease constipation by drinking plenty of fluids and eating high-fiber foods. Ask your doctor or midwife about over-the-counter stool softeners.  Activity  Rest when you can.  Ask for help from family or friends when you need it.  If you can, have another adult in your home for at least 2 or 3 days after birth.  When you feel ready, try to get some exercise every day. For many people, walking is a good choice. Don't do any heavy exercise until your doctor or midwife says it's okay.  Ask your doctor or midwife when it is okay to have vaginal sex.  If you don't want to get pregnant, talk to your doctor or midwife about birth control options. You can get pregnant even before your period  returns. Also, you can get pregnant while you are breastfeeding.  Talk to your doctor or midwife if you want to get pregnant again. They can talk to you about when it is safe.  Emotional health  It's normal to have some sadness, anxiety, and mood swings after delivery. It may help to talk with a trusted friend or family member. You can also call the Maternal Mental Health Hotline at 7-184-[facility] (1-389.632.7147) for support. If these mood changes last more than a couple of weeks, talk to your doctor or midwife.  When should you call for help?  Share this information with your partner, family, or a friend. They can help you watch for warning signs.  Call 481  anytime you think you may need emergency care. For example, call if:    You feel you cannot stop from hurting yourself, your baby, or someone else.     You passed out (lost consciousness).     You have chest pain, are short of breath, or cough up blood.     You have a seizure.   Where to get help 24 hours a day, 7 days a week   If you or someone you know talks about suicide, self-harm, a mental health crisis, a substance use crisis, or any other kind of emotional distress, get help right away. You can:    Call the Suicide and Crisis Lifeline at 889.     Call 5-648-548-QTRY (1-330.660.6471).     Text HOME to 419364 to access the Crisis Text Line.   Consider saving these numbers in your phone.  Go to [employer].org for more information or to chat online.  Call your doctor or midwife now or seek immediate medical care if:    You have signs of hemorrhage (too much bleeding), such as:  Heavy vaginal bleeding. This means that you are soaking through one or more pads in an hour. Or you pass blood clots bigger than an egg.  Feeling dizzy or lightheaded, or you feel like you may faint.  Feeling so tired or weak that you cannot do your usual activities.  A fast or irregular heartbeat.  New or worse belly pain.     You have signs of infection, such as:  A  "fever.  Increased pain, swelling, warmth, or redness from an incision or wound.  Frequent or painful urination or blood in your urine.  Vaginal discharge that smells bad.  New or worse belly pain.     You have symptoms of a blood clot in your leg (called a deep vein thrombosis), such as:  Pain in the calf, back of the knee, thigh, or groin.  Swelling in the leg or groin.  A color change on the leg or groin. The skin may be reddish or purplish, depending on your usual skin color.     You have signs of preeclampsia, such as:  Sudden swelling of your face, hands, or feet.  New vision problems (such as dimness, blurring, or seeing spots).  A severe headache.     You have signs of heart failure, such as:  New or increased shortness of breath.  New or worse swelling in your legs, ankles, or feet.  Sudden weight gain, such as more than 2 to 3 pounds in a day or 5 pounds in a week.  Feeling so tired or weak that you cannot do your usual activities.     You had spinal or epidural pain relief and have:  New or worse back pain.  Increased pain, swelling, warmth, or redness at the injection site.  Tingling, weakness, or numbness in your legs or groin.   Watch closely for changes in your health, and be sure to contact your doctor or midwife if:    Your vaginal bleeding isn't decreasing.     You feel sad, anxious, or hopeless for more than a few days.     You are having problems with your breasts or breastfeeding.   Where can you learn more?  Go to https://www.[employer].net/patiented  Enter Z768 in the search box to learn more about \"Postpartum Care at Home With Your Baby: Care Instructions.\"  Current as of: July 10, 2023               Content Version: 14.0    1752-4778 [employer].   Care instructions adapted under license by your healthcare professional. If you have questions about a medical condition or this instruction, always ask your healthcare professional. [employer] disclaims any warranty or " liability for your use of this information.

## 2024-07-20 NOTE — ANESTHESIA POSTPROCEDURE EVALUATION
Patient: Chloe Cleaning    Procedure: * No procedures listed *       Anesthesia Type:  Epidural    Note:  Disposition: Inpatient   Postop Pain Control: Uneventful            Sign Out: Well controlled pain   PONV: No   Neuro/Psych: Uneventful            Sign Out: Acceptable/Baseline neuro status   Airway/Respiratory: Uneventful            Sign Out: Acceptable/Baseline resp. status   CV/Hemodynamics: Uneventful            Sign Out: Acceptable CV status; No obvious hypovolemia; No obvious fluid overload   Other NRE:    DID A NON-ROUTINE EVENT OCCUR?     Event details/Postop Comments:  Patient doing well. Plan for discharge today.           Last vitals:  Vitals:    07/19/24 2316 07/20/24 0500 07/20/24 0828   BP:  112/74 120/79   Pulse: 79 82 64   Resp: 16 16 16   Temp: 36.6  C (97.8  F)  36.5  C (97.7  F)   SpO2:          Electronically Signed By: Silvia Smalls MD  July 20, 2024  12:22 PM

## 2024-07-20 NOTE — PLAN OF CARE
Goal Outcome Evaluation:      Plan of Care Reviewed With: patient, sibling    Overall Patient Progress: improvingOverall Patient Progress: improving     D: VSS, assessments WDL.   I: Pt. received complete discharge paperwork and home medications as filled by discharge pharmacy.  Pt. was given times of last dose for all discharge medications in writing on discharge medication sheets.  Discharge teaching included home medication, pain management, activity restrictions, postpartum cares, and signs and symptoms of infection.    A: Discharge outcomes on care plan met.  Mother states understanding and comfort with self care and follow up care.   P: Pt. Discharged.  Pt. was accompanied by brother and sister and left with personal belongings. Pt. to follow up with OB provider per discharge instructions.  Pt. had no further questions at the time of discharge and no unmet needs were identified.

## 2024-07-23 ENCOUNTER — PATIENT OUTREACH (OUTPATIENT)
Dept: CARE COORDINATION | Facility: CLINIC | Age: 29
End: 2024-07-23

## 2024-07-23 NOTE — PROGRESS NOTES
[facility] Contact  [facility]/Voicemail     Clinical Data: Post-Discharge Outreach     Outreach attempted x 2.  Left message on patient's voicemail, providing [facility]'s central phone number of [phone] ([phone]) for questions/concerns and/or to schedule an appt with an [facility] provider, if they do not have a PCP.      Plan:  [facility] will do no further outreaches at this time.       CLYDE Trinh  [phone]  [facility]

## 2024-09-20 DIAGNOSIS — O26.893 HEARTBURN DURING PREGNANCY IN THIRD TRIMESTER: ICD-10-CM

## 2024-09-20 DIAGNOSIS — R12 HEARTBURN DURING PREGNANCY IN THIRD TRIMESTER: ICD-10-CM

## 2024-09-20 NOTE — TELEPHONE ENCOUNTER
Can address at PP visit for future refills. 1 month extension sent    Iris Silveira RN on 9/20/2024 at 2:25 PM     none

## 2024-09-20 NOTE — TELEPHONE ENCOUNTER
Requested Prescriptions   Pending Prescriptions Disp Refills    omeprazole (PRILOSEC) 20 MG DR capsule 60 capsule 1     Sig: Take 1 capsule (20 mg) by mouth daily.       PPI Protocol Failed - 9/20/2024  1:46 PM        Failed - Recent (12 mo) or future (90 days) visit within the authorizing provider's specialty     The patient must have completed an in-person or virtual visit within the past 12 months or has a future visit scheduled within the next 90 days with the authorizing provider s specialty.  Urgent care and e-visits do not quality as an office visit for this protocol.          Passed - Medication is active on med list        Passed - Medication indicated for associated diagnosis     The medication is prescribed for one or more of the following conditions:     Erosive esophagitis    Gastritis   Gastric hypersecretion   Gastric ulcer   Gastroesophageal reflux disease   Helicobacter pylori gastrointestinal tract infection   Ulcer of duodenum   Drug-induced peptic ulcer   Esophageal stricture   Gastrointestinal hemorrhage   Indigestion   Stress ulcer   Zollinger-Ty syndrome   Tyson s esophagus   Laryngopharyngeal reflux   Epigastric Pain   Morbid Obesity   Cough          Passed - Patient is age 18 or older        Passed - No active pregnacy on record        Passed - No positive pregnancy test in past 12 months

## 2024-11-26 NOTE — TELEPHONE ENCOUNTER
The appeal has been approved for Restasis 0.05%. The time period for this approval is from 5/25/20-6/25/23.     
Urgent appeal has been submitted via fax to [facility] today.  
breast exam

## 2025-01-04 ENCOUNTER — HOSPITAL ENCOUNTER (EMERGENCY)
Facility: CLINIC | Age: 30
Discharge: HOME | End: 2025-01-04
Attending: EMERGENCY MEDICINE | Admitting: EMERGENCY MEDICINE
Payer: COMMERCIAL

## 2025-01-04 VITALS
RESPIRATION RATE: 16 BRPM | DIASTOLIC BLOOD PRESSURE: 68 MMHG | BODY MASS INDEX: 29.19 KG/M2 | OXYGEN SATURATION: 99 % | TEMPERATURE: 98.1 F | HEART RATE: 84 BPM | WEIGHT: 175.2 LBS | SYSTOLIC BLOOD PRESSURE: 118 MMHG | HEIGHT: 65 IN

## 2025-01-04 DIAGNOSIS — R51.9 NONINTRACTABLE HEADACHE, UNSPECIFIED CHRONICITY PATTERN, UNSPECIFIED HEADACHE TYPE: ICD-10-CM

## 2025-01-04 PROCEDURE — 99284 EMERGENCY DEPT VISIT MOD MDM: CPT | Mod: 25 | Performed by: EMERGENCY MEDICINE

## 2025-01-04 PROCEDURE — 96374 THER/PROPH/DIAG INJ IV PUSH: CPT | Performed by: EMERGENCY MEDICINE

## 2025-01-04 PROCEDURE — 96375 TX/PRO/DX INJ NEW DRUG ADDON: CPT | Performed by: EMERGENCY MEDICINE

## 2025-01-04 PROCEDURE — 250N000013 HC RX MED GY IP 250 OP 250 PS 637: Performed by: EMERGENCY MEDICINE

## 2025-01-04 PROCEDURE — 250N000011 HC RX IP 250 OP 636: Performed by: EMERGENCY MEDICINE

## 2025-01-04 PROCEDURE — 96361 HYDRATE IV INFUSION ADD-ON: CPT | Performed by: EMERGENCY MEDICINE

## 2025-01-04 PROCEDURE — 258N000003 HC RX IP 258 OP 636: Performed by: EMERGENCY MEDICINE

## 2025-01-04 RX ORDER — DEXAMETHASONE SODIUM PHOSPHATE 10 MG/ML
10 INJECTION, SOLUTION INTRAMUSCULAR; INTRAVENOUS ONCE
Status: COMPLETED | OUTPATIENT
Start: 2025-01-04 | End: 2025-01-04

## 2025-01-04 RX ORDER — METOCLOPRAMIDE HYDROCHLORIDE 5 MG/ML
10 INJECTION INTRAMUSCULAR; INTRAVENOUS ONCE
Status: COMPLETED | OUTPATIENT
Start: 2025-01-04 | End: 2025-01-04

## 2025-01-04 RX ORDER — ACETAMINOPHEN 500 MG
1000 TABLET ORAL ONCE
Status: COMPLETED | OUTPATIENT
Start: 2025-01-04 | End: 2025-01-04

## 2025-01-04 RX ORDER — KETOROLAC TROMETHAMINE 15 MG/ML
15 INJECTION, SOLUTION INTRAMUSCULAR; INTRAVENOUS ONCE
Status: COMPLETED | OUTPATIENT
Start: 2025-01-04 | End: 2025-01-04

## 2025-01-04 RX ORDER — DIPHENHYDRAMINE HYDROCHLORIDE 50 MG/ML
25 INJECTION INTRAMUSCULAR; INTRAVENOUS ONCE
Status: COMPLETED | OUTPATIENT
Start: 2025-01-04 | End: 2025-01-04

## 2025-01-04 RX ADMIN — KETOROLAC TROMETHAMINE 15 MG: 15 INJECTION, SOLUTION INTRAMUSCULAR; INTRAVENOUS at 08:05

## 2025-01-04 RX ADMIN — SODIUM CHLORIDE 1000 ML: 9 INJECTION, SOLUTION INTRAVENOUS at 08:00

## 2025-01-04 RX ADMIN — ACETAMINOPHEN 1000 MG: 500 TABLET, FILM COATED ORAL at 08:03

## 2025-01-04 RX ADMIN — DEXAMETHASONE SODIUM PHOSPHATE 10 MG: 10 INJECTION, SOLUTION INTRAMUSCULAR; INTRAVENOUS at 08:06

## 2025-01-04 RX ADMIN — METOCLOPRAMIDE 10 MG: 5 INJECTION, SOLUTION INTRAMUSCULAR; INTRAVENOUS at 08:07

## 2025-01-04 RX ADMIN — DIPHENHYDRAMINE HYDROCHLORIDE 25 MG: 50 INJECTION, SOLUTION INTRAMUSCULAR; INTRAVENOUS at 08:04

## 2025-01-04 ASSESSMENT — ACTIVITIES OF DAILY LIVING (ADL)
ADLS_ACUITY_SCORE: 54

## 2025-01-04 ASSESSMENT — COLUMBIA-SUICIDE SEVERITY RATING SCALE - C-SSRS
1. IN THE PAST MONTH, HAVE YOU WISHED YOU WERE DEAD OR WISHED YOU COULD GO TO SLEEP AND NOT WAKE UP?: NO
2. HAVE YOU ACTUALLY HAD ANY THOUGHTS OF KILLING YOURSELF IN THE PAST MONTH?: NO
6. HAVE YOU EVER DONE ANYTHING, STARTED TO DO ANYTHING, OR PREPARED TO DO ANYTHING TO END YOUR LIFE?: NO

## 2025-01-04 NOTE — ED TRIAGE NOTES
Headache for 5 days, has tried OTC meds without relief. No other symptoms. Hx of migraine headaches but this headache feels different.     Triage Assessment (Adult)       Row Name 01/04/25 0603          Triage Assessment    Airway WDL WDL        Respiratory WDL    Respiratory WDL WDL        Skin Circulation/Temperature WDL    Skin Circulation/Temperature WDL WDL        Cardiac WDL    Cardiac WDL WDL        Peripheral/Neurovascular WDL    Peripheral Neurovascular WDL WDL        Cognitive/Neuro/Behavioral WDL    Cognitive/Neuro/Behavioral WDL WDL

## 2025-01-04 NOTE — DISCHARGE INSTRUCTIONS
You can take ibuprofen (Advil) 600 mg every 6 hours and acetaminophen (Tylenol) 1000 mg every 6 hours for pain.  You can alternate one or the other every 3 hours and you can set timers on your phone to make sure that you stay on top of the pain.

## 2025-01-04 NOTE — ED PROVIDER NOTES
History obtained via phone  as patient is Lebanese speaking only    History     Chief Complaint:  Headache       HPI   Chloe Cleaning is a 29 year old female who p/w 5 days of headache.  Patient was not doing anything in particular when the headache started, it was not acute onset, it has been diffuse, 10 out of 10 in severity.  It is similar to her migraines but her migraines usually go away after taking Tylenol.  She took Tylenol yesterday without much relief.  Denies any fever, neck stiffness, recent head trauma, or URI symptoms or vomiting.  No chest pain shortness of breath or any other symptoms.  No changes in vision, weakness anywhere or changes in speech.  Denies chance of pregnancy and she is not breast-feeding.      Independent Historian:    Pt only    Review of External Notes:  Patient admitted 7/18/2024 and discharged 2 days later, patient had vaginal delivery, amniotomy performed, postop course notable for urinary retention    Medications:    acetaminophen (TYLENOL) 325 MG tablet  docusate sodium (COLACE) 100 MG capsule  ibuprofen (ADVIL/MOTRIN) 800 MG tablet  omeprazole (PRILOSEC) 20 MG DR capsule  Prenatal Vit-Fe Fumarate-FA (PRENATAL VITAMIN AND MINERAL) 28-0.8 MG TABS        Past Medical History:    Past Medical History:   Diagnosis Date    Conjunctival melanosis, bilateral     Dry eye     Keratitis     Limbal stem cell deficiency     Microcytic anemia     Migraines     Pannus (corneal), bilateral     Vacuum-assisted vaginal delivery        Past Surgical History:    Past Surgical History:   Procedure Laterality Date    REPAIR RETRACTION LID Left 5/11/2017    Procedure: REPAIR RETRACTION LID;  Left Lower Eyelid Retraction Repair with Acellular Dermis Graft and Temporary Tarsorrhaphy, Blunt Suture;  Surgeon: Jessi Chapman MD;  Location: [facility] OR    REPAIR RETRACTION LID Right 6/8/2017    Procedure: REPAIR RETRACTION LID;  Right Lower Eyelid Retraction Repair with Acellular Dermis Graft and  "Temporary Tarsorrhaphy;  Surgeon: Jessi Chapman MD;  Location: [facility] OR    TARSORRHAPHY Left 5/11/2017    Procedure: TARSORRHAPHY;;  Surgeon: Jessi Chapman MD;  Location: [facility] OR    TARSORRHAPHY Right 6/8/2017    Procedure: TARSORRHAPHY;;  Surgeon: Jessi Chapman MD;  Location: [facility] OR          Physical Exam   Patient Vitals for the past 24 hrs:   BP Temp Temp src Pulse Resp SpO2 Height Weight   01/04/25 0742 -- -- -- -- 18 100 % -- --   01/04/25 0741 111/79 -- -- 86 -- -- -- --   01/04/25 0552 120/76 98.1  F (36.7  C) Oral 84 20 98 % 1.651 m (5' 5\") 79.5 kg (175 lb 3.2 oz)        Physical Exam  GENERAL: Awake, alert, lying in a darkened room with her eyes closed  Neck: No reproducible tenderness, no stiffness  CARDIOVASCULAR: Normal peripheral perfusion  LUNGS: Breathing comfortably on room air  ABDOMEN: Nondistended   EXTREMITIES: No peripheral edema  NEUROLOGICAL: Patient is awake, face is symmetric, tongue is midline, extraocular muscles intact, no dysarthria, no dysmetria on finger-to-nose, 5 out of 5 strength throughout and sensation is normal      Emergency Department Course     Imaging:  No orders to display       Laboratory:  Labs Ordered and Resulted from Time of ED Arrival to Time of ED Departure - No data to display       Emergency Department Course & Assessments:    Interventions:  Medications   sodium chloride 0.9% BOLUS 1,000 mL (0 mLs Intravenous Stopped 1/4/25 0843)   ketorolac (TORADOL) injection 15 mg (15 mg Intravenous $Given 1/4/25 0805)   metoclopramide (REGLAN) injection 10 mg (10 mg Intravenous $Given 1/4/25 0807)   diphenhydrAMINE (BENADRYL) injection 25 mg (25 mg Intravenous $Given 1/4/25 0804)   acetaminophen (TYLENOL) tablet 1,000 mg (1,000 mg Oral $Given 1/4/25 0803)   dexAMETHasone PF (DECADRON) injection 10 mg (10 mg Intravenous $Given 1/4/25 0806)             Disposition:  The patient was discharged.    Impression & Plan    CMS Diagnoses: None       Medical Decision " Makin-year-old female who presents emergency department for evaluation of gradual onset headache, no neck stiffness, nonfocal neurological exam, no red flag symptoms to suggest emergent etiology such as subarachnoid hemorrhage, venous sinus thrombosis, CVA, or bacterial meningitis at this time.  She was given above medications with symptomatic improvement and complete resolution of her headache.  She has a longstanding history of migraines, has never seen a neurologist, she was given follow-up with neurology or can see her PCP return if worsening    Diagnosis:    ICD-10-CM    1. Nonintractable headache, unspecified chronicity pattern, unspecified headache type  R51.9            Discharge Medications:  New Prescriptions    No medications on file               Kandace Wallace MD  25 [id number]

## 2025-04-18 ENCOUNTER — APPOINTMENT (OUTPATIENT)
Dept: ULTRASOUND IMAGING | Facility: CLINIC | Age: 30
End: 2025-04-18
Attending: PHYSICIAN ASSISTANT
Payer: COMMERCIAL

## 2025-04-18 ENCOUNTER — HOSPITAL ENCOUNTER (EMERGENCY)
Facility: CLINIC | Age: 30
Discharge: HOME | End: 2025-04-18
Attending: PHYSICIAN ASSISTANT | Admitting: PHYSICIAN ASSISTANT
Payer: COMMERCIAL

## 2025-04-18 VITALS
SYSTOLIC BLOOD PRESSURE: 109 MMHG | TEMPERATURE: 98 F | HEART RATE: 84 BPM | DIASTOLIC BLOOD PRESSURE: 87 MMHG | OXYGEN SATURATION: 100 % | RESPIRATION RATE: 15 BRPM

## 2025-04-18 DIAGNOSIS — K80.20 CALCULUS OF GALLBLADDER WITHOUT CHOLECYSTITIS WITHOUT OBSTRUCTION: ICD-10-CM

## 2025-04-18 DIAGNOSIS — O30.001 TWIN GESTATION IN FIRST TRIMESTER, UNSPECIFIED MULTIPLE GESTATION TYPE: ICD-10-CM

## 2025-04-18 DIAGNOSIS — R10.13 EPIGASTRIC PAIN: ICD-10-CM

## 2025-04-18 DIAGNOSIS — O21.9 NAUSEA AND VOMITING IN PREGNANCY: ICD-10-CM

## 2025-04-18 LAB
ALBUMIN SERPL BCG-MCNC: 3.8 G/DL (ref 3.5–5.2)
ALBUMIN UR-MCNC: 10 MG/DL
ALP SERPL-CCNC: 80 U/L (ref 40–150)
ALT SERPL W P-5'-P-CCNC: 26 U/L (ref 0–50)
ANION GAP SERPL CALCULATED.3IONS-SCNC: 12 MMOL/L (ref 7–15)
APPEARANCE UR: CLEAR
AST SERPL W P-5'-P-CCNC: 20 U/L (ref 0–45)
ATRIAL RATE - MUSE: 83 BPM
BASOPHILS # BLD AUTO: 0 10E3/UL (ref 0–0.2)
BASOPHILS NFR BLD AUTO: 0 %
BILIRUB DIRECT SERPL-MCNC: <0.08 MG/DL (ref 0–0.3)
BILIRUB SERPL-MCNC: 0.2 MG/DL
BILIRUB UR QL STRIP: NEGATIVE
BUN SERPL-MCNC: 6.9 MG/DL (ref 6–20)
CALCIUM SERPL-MCNC: 8.6 MG/DL (ref 8.8–10.4)
CHLORIDE SERPL-SCNC: 100 MMOL/L (ref 98–107)
COLOR UR AUTO: YELLOW
CREAT SERPL-MCNC: 0.56 MG/DL (ref 0.51–0.95)
DIASTOLIC BLOOD PRESSURE - MUSE: NORMAL MMHG
EGFRCR SERPLBLD CKD-EPI 2021: >90 ML/MIN/1.73M2
EOSINOPHIL # BLD AUTO: 0 10E3/UL (ref 0–0.7)
EOSINOPHIL NFR BLD AUTO: 1 %
ERYTHROCYTE [DISTWIDTH] IN BLOOD BY AUTOMATED COUNT: 14.6 % (ref 10–15)
GLUCOSE SERPL-MCNC: 105 MG/DL (ref 70–99)
GLUCOSE UR STRIP-MCNC: NEGATIVE MG/DL
HCG INTACT+B SERPL-ACNC: ABNORMAL MIU/ML
HCO3 SERPL-SCNC: 21 MMOL/L (ref 22–29)
HCT VFR BLD AUTO: 34.1 % (ref 35–47)
HGB BLD-MCNC: 11.2 G/DL (ref 11.7–15.7)
HGB UR QL STRIP: NEGATIVE
HOLD SPECIMEN: NORMAL
IMM GRANULOCYTES # BLD: 0 10E3/UL
IMM GRANULOCYTES NFR BLD: 1 %
INTERPRETATION ECG - MUSE: NORMAL
KETONES UR STRIP-MCNC: NEGATIVE MG/DL
LEUKOCYTE ESTERASE UR QL STRIP: NEGATIVE
LIPASE SERPL-CCNC: 19 U/L (ref 13–60)
LYMPHOCYTES # BLD AUTO: 0.9 10E3/UL (ref 0.8–5.3)
LYMPHOCYTES NFR BLD AUTO: 22 %
MCH RBC QN AUTO: 27.1 PG (ref 26.5–33)
MCHC RBC AUTO-ENTMCNC: 32.8 G/DL (ref 31.5–36.5)
MCV RBC AUTO: 82 FL (ref 78–100)
MONOCYTES # BLD AUTO: 0.4 10E3/UL (ref 0–1.3)
MONOCYTES NFR BLD AUTO: 9 %
MUCOUS THREADS #/AREA URNS LPF: PRESENT /LPF
NEUTROPHILS # BLD AUTO: 2.8 10E3/UL (ref 1.6–8.3)
NEUTROPHILS NFR BLD AUTO: 68 %
NITRATE UR QL: NEGATIVE
NRBC # BLD AUTO: 0 10E3/UL
NRBC BLD AUTO-RTO: 0 /100
P AXIS - MUSE: 54 DEGREES
PH UR STRIP: 6.5 [PH] (ref 5–7)
PLATELET # BLD AUTO: 197 10E3/UL (ref 150–450)
POTASSIUM SERPL-SCNC: 3.6 MMOL/L (ref 3.4–5.3)
PR INTERVAL - MUSE: 144 MS
PROT SERPL-MCNC: 7.3 G/DL (ref 6.4–8.3)
QRS DURATION - MUSE: 70 MS
QT - MUSE: 328 MS
QTC - MUSE: 385 MS
R AXIS - MUSE: 27 DEGREES
RBC # BLD AUTO: 4.14 10E6/UL (ref 3.8–5.2)
RBC URINE: 1 /HPF
SODIUM SERPL-SCNC: 133 MMOL/L (ref 135–145)
SP GR UR STRIP: 1.03 (ref 1–1.03)
SQUAMOUS EPITHELIAL: 2 /HPF
SYSTOLIC BLOOD PRESSURE - MUSE: NORMAL MMHG
T AXIS - MUSE: 48 DEGREES
TROPONIN T SERPL HS-MCNC: <6 NG/L
UROBILINOGEN UR STRIP-MCNC: 2 MG/DL
VENTRICULAR RATE- MUSE: 83 BPM
WBC # BLD AUTO: 4.1 10E3/UL (ref 4–11)
WBC URINE: 1 /HPF

## 2025-04-18 PROCEDURE — 96361 HYDRATE IV INFUSION ADD-ON: CPT

## 2025-04-18 PROCEDURE — 84155 ASSAY OF PROTEIN SERUM: CPT | Performed by: PHYSICIAN ASSISTANT

## 2025-04-18 PROCEDURE — 85025 COMPLETE CBC W/AUTO DIFF WBC: CPT | Performed by: PHYSICIAN ASSISTANT

## 2025-04-18 PROCEDURE — 99285 EMERGENCY DEPT VISIT HI MDM: CPT | Mod: 25

## 2025-04-18 PROCEDURE — 82248 BILIRUBIN DIRECT: CPT | Performed by: PHYSICIAN ASSISTANT

## 2025-04-18 PROCEDURE — 96374 THER/PROPH/DIAG INJ IV PUSH: CPT

## 2025-04-18 PROCEDURE — 80048 BASIC METABOLIC PNL TOTAL CA: CPT | Performed by: PHYSICIAN ASSISTANT

## 2025-04-18 PROCEDURE — 258N000003 HC RX IP 258 OP 636: Performed by: PHYSICIAN ASSISTANT

## 2025-04-18 PROCEDURE — 250N000011 HC RX IP 250 OP 636: Performed by: PHYSICIAN ASSISTANT

## 2025-04-18 PROCEDURE — 85025 COMPLETE CBC W/AUTO DIFF WBC: CPT | Performed by: EMERGENCY MEDICINE

## 2025-04-18 PROCEDURE — 84484 ASSAY OF TROPONIN QUANT: CPT | Performed by: EMERGENCY MEDICINE

## 2025-04-18 PROCEDURE — 80048 BASIC METABOLIC PNL TOTAL CA: CPT | Performed by: EMERGENCY MEDICINE

## 2025-04-18 PROCEDURE — 36415 COLL VENOUS BLD VENIPUNCTURE: CPT | Performed by: EMERGENCY MEDICINE

## 2025-04-18 PROCEDURE — 84702 CHORIONIC GONADOTROPIN TEST: CPT | Performed by: PHYSICIAN ASSISTANT

## 2025-04-18 PROCEDURE — 76705 ECHO EXAM OF ABDOMEN: CPT

## 2025-04-18 PROCEDURE — 81001 URINALYSIS AUTO W/SCOPE: CPT | Performed by: PHYSICIAN ASSISTANT

## 2025-04-18 PROCEDURE — 76802 OB US < 14 WKS ADDL FETUS: CPT

## 2025-04-18 PROCEDURE — 76801 OB US < 14 WKS SINGLE FETUS: CPT

## 2025-04-18 PROCEDURE — 83690 ASSAY OF LIPASE: CPT | Performed by: PHYSICIAN ASSISTANT

## 2025-04-18 PROCEDURE — 82310 ASSAY OF CALCIUM: CPT | Performed by: EMERGENCY MEDICINE

## 2025-04-18 RX ORDER — CALCIUM CARBONATE 500 MG/1
2 TABLET, CHEWABLE ORAL 2 TIMES DAILY
Qty: 20 TABLET | Refills: 0 | Status: SHIPPED | OUTPATIENT
Start: 2025-04-18

## 2025-04-18 RX ORDER — ONDANSETRON 2 MG/ML
4 INJECTION INTRAMUSCULAR; INTRAVENOUS ONCE
Status: COMPLETED | OUTPATIENT
Start: 2025-04-18 | End: 2025-04-18

## 2025-04-18 RX ORDER — ONDANSETRON 4 MG/1
4 TABLET, ORALLY DISINTEGRATING ORAL EVERY 8 HOURS PRN
Qty: 10 TABLET | Refills: 0 | Status: SHIPPED | OUTPATIENT
Start: 2025-04-18 | End: 2025-04-21

## 2025-04-18 RX ADMIN — SODIUM CHLORIDE 1000 ML: 9 INJECTION, SOLUTION INTRAVENOUS at 16:10

## 2025-04-18 RX ADMIN — ONDANSETRON 4 MG: 2 INJECTION, SOLUTION INTRAMUSCULAR; INTRAVENOUS at 16:10

## 2025-04-18 ASSESSMENT — ACTIVITIES OF DAILY LIVING (ADL)
ADLS_ACUITY_SCORE: 54

## 2025-04-18 ASSESSMENT — COLUMBIA-SUICIDE SEVERITY RATING SCALE - C-SSRS
6. HAVE YOU EVER DONE ANYTHING, STARTED TO DO ANYTHING, OR PREPARED TO DO ANYTHING TO END YOUR LIFE?: NO
1. IN THE PAST MONTH, HAVE YOU WISHED YOU WERE DEAD OR WISHED YOU COULD GO TO SLEEP AND NOT WAKE UP?: NO
2. HAVE YOU ACTUALLY HAD ANY THOUGHTS OF KILLING YOURSELF IN THE PAST MONTH?: NO

## 2025-04-18 NOTE — ED PROVIDER NOTES
Emergency Department Note      History of Present Illness     Chief Complaint   Chest Pain, Abdominal Pain, and Shortness of Breath      HPI obtained using professional Togolese speaking .   Chloe Cleaning is a 29 year old 2 months pregnant G3L2 female who presents to the Emergency Department for evaluation of a nausea, vomiting, abdominal pain, chest pain and dizziness. She reports her symptoms started 2 months ago at the onset of pregnancy. She reports her pain is localized to the mid epigastric area and simultaneously presents with chest pain. She reports the pain as a dull achy pain. She reports these symptoms were identical to her previous pregnancy. She denies any vaginal bleeding, vaginal discharge, dysuria or hematuria. She denies past personal or family history of heart or lung disease. She has an upcoming OBGYN appointment in 3 days on 4/21/2025 with an USG.     Independent Historian   None mother present in the Emergency Department room.     Review of External Notes   None    Past Medical History     Medical History and Problem List   Migraines     Medications   Ibuprofen   Omeprazole      Surgical History   Repair retraction lid   Tarsorrhaphy   Physical Exam     Patient Vitals for the past 24 hrs:   BP Temp Temp src Pulse Resp SpO2   04/18/25 1918 109/87 98  F (36.7  C) Oral 84 15 100 %   04/18/25 1426 104/73 98.2  F (36.8  C) Oral 83 18 99 %     Physical Exam  Constitutional: Alert, attentive, GCS 15  HENT:    Nose: Nose normal.    Mouth/Throat: Oropharynx is clear, mucous membranes are moist   Eyes: EOM are normal. Pupils equal and reactive.  Neck: Normal range of motion. No rigidity.  CV: regular rate and rhythm; no murmurs, rubs or gallups  Chest: Effort normal and breath sounds normal.   GI:  Epigastric tenderness without rebound. No distension. Normal bowel sounds  MSK: Normal range of motion.   Neurological: Alert, attentive  Skin: Skin is warm and dry.     Diagnostics     Lab Results    Labs Ordered and Resulted from Time of ED Arrival to Time of ED Departure   BASIC METABOLIC PANEL - Abnormal       Result Value    Sodium 133 (*)     Potassium 3.6      Chloride 100      Carbon Dioxide (CO2) 21 (*)     Anion Gap 12      Urea Nitrogen 6.9      Creatinine 0.56      GFR Estimate >90      Calcium 8.6 (*)     Glucose 105 (*)    CBC WITH PLATELETS AND DIFFERENTIAL - Abnormal    WBC Count 4.1      RBC Count 4.14      Hemoglobin 11.2 (*)     Hematocrit 34.1 (*)     MCV 82      MCH 27.1      MCHC 32.8      RDW 14.6      Platelet Count 197      % Neutrophils 68      % Lymphocytes 22      % Monocytes 9      % Eosinophils 1      % Basophils 0      % Immature Granulocytes 1      NRBCs per 100 WBC 0      Absolute Neutrophils 2.8      Absolute Lymphocytes 0.9      Absolute Monocytes 0.4      Absolute Eosinophils 0.0      Absolute Basophils 0.0      Absolute Immature Granulocytes 0.0      Absolute NRBCs 0.0     HCG QUANTITATIVE PREGNANCY - Abnormal    hCG Quantitative 143,813 (*)    ROUTINE UA WITH MICROSCOPIC REFLEX TO CULTURE - Abnormal    Color Urine Yellow      Appearance Urine Clear      Glucose Urine Negative      Bilirubin Urine Negative      Ketones Urine Negative      Specific Gravity Urine 1.030      Blood Urine Negative      pH Urine 6.5      Protein Albumin Urine 10 (*)     Urobilinogen Urine 2.0 (*)     Nitrite Urine Negative      Leukocyte Esterase Urine Negative      Mucus Urine Present (*)     RBC Urine 1      WBC Urine 1      Squamous Epithelials Urine 2 (*)    TROPONIN T, HIGH SENSITIVITY - Normal    Troponin T, High Sensitivity <6     HEPATIC FUNCTION PANEL - Normal    Protein Total 7.3      Albumin 3.8      Bilirubin Total 0.2      Alkaline Phosphatase 80      AST 20      ALT 26      Bilirubin Direct <0.08     LIPASE - Normal    Lipase 19         Imaging   US Abdomen Limited   Final Result   IMPRESSION:   1.  Cholelithiasis.            US OB < 14 Weeks [facility]   Final Result   IMPRESSION:     1.  Twin mono amnionic monochorionic gestation.   2.  Twin A with cardiac activity at 10 weeks 1, with EDC of 11/12/2025.   3.  Twin B with cardiac activity at 10 weeks 2 days, with EDC of 11/13/2025          EKG   ECG results from 04/18/25   EKG 12-lead, tracing only     Value    Systolic Blood Pressure     Diastolic Blood Pressure     Ventricular Rate 83    Atrial Rate 83    TN Interval 144    QRS Duration 70        QTc 385    P Axis 54    R AXIS 27    T Axis 48    Interpretation ECG      Sinus rhythm  Normal ECG  When compared with ECG of 28-Jun-2024 11:04,  No significant change was found          Independent Interpretation   None    ED Course      Medications Administered   Medications   sodium chloride 0.9% BOLUS 1,000 mL (0 mLs Intravenous Stopped 4/18/25 1905)   ondansetron (ZOFRAN) injection 4 mg (4 mg Intravenous $Given 4/18/25 1610)       Procedures   Procedures     Discussion of Management   None    ED Course   ED Course as of 04/18/25 2310   Fri Apr 18, 2025   6258 I obtained the history and examined the patient as above.        Additional Documentation  None    Medical Decision Making / Diagnosis     CMS Diagnoses: None    MIPS       None    [facility]   Chloe Cleaning is a 29 year old female currently 10 weeks pregnant with twin gestation who presents with several symptoms including burning sensation in epigastrium into her chest, dizziness, nausea, and vomiting. She has not yet seen OB/GYN or had ultrasound for current pregnancy.  She denies any vaginal bleeding or discharge today. Vitals normal on arrival.  Labs today reveal mild hyponatremia at 133 and anemia with hemoglobin 11.2.  This is around patient's baseline hemoglobin.  hCG is at appropriate level.  OB ultrasound confirms twin intrauterine gestation at approximately 10 weeks.  Ultrasound of the right upper quadrant reveals cholelithiasis without evidence of cholecystitis.  LFTs and lipase are normal today.  I have low suspicion for acute  biliary process or obstruction given her reassuring labs and vitals here today.  UA shows no evidence of bacteriuria or infection. No evidence of ACS on EKG and troponins. Low suspicion for PE based on history, exam, and vitals.  Results reviewed with patient at bedside.  She had symptomatic improvement with IV fluids and Zofran.  I do suspect she likely has mild acid reflux as she describes a burning sensation in her epigastrium.  She will be started on Zofran for her nausea and also Tums as needed for the acid reflux.  She has follow-up with her OB in 3 days which is appropriate.  Given cholelithiasis, a referral to general surgery will be placed.  Cautioned that she may develop acute cholecystitis and to return with any fevers, vomiting, or worsening pain.  Patient is comfortable with plan and we reviewed return precautions in detail.  Questions answered before discharge.    Disposition   The patient was discharged.     Diagnosis     ICD-10-CM    1. Nausea and vomiting in pregnancy  O21.9       2. Twin gestation in first trimester, unspecified multiple gestation type  O30.001       3. Calculus of gallbladder without cholecystitis without obstruction  K80.20 Adult Gen Surg  Referral      4. Epigastric pain  R10.13            Discharge Medications   Discharge Medication List as of 4/18/2025  7:05 PM        START taking these medications    Details   calcium carbonate (TUMS) 500 MG chewable tablet Take 2 tablets (1,000 mg) by mouth 2 times daily., Disp-20 tablet, R-0, E-Prescribe      ondansetron (ZOFRAN ODT) 4 MG ODT tab Take 1 tablet (4 mg) by mouth every 8 hours as needed for nausea., Disp-10 tablet, R-0, E-Prescribe               Scribe Disclosure:  I, Joel Solis, am serving as a scribe at 3:38 PM on 4/18/2025 to document services personally performed by Silvia Lackey PA-C based on my observations and the provider's statements to me.        Silvia Lackey PA-C  04/18/25 7001        Silvia Lackey PA-C  04/18/25 0790

## 2025-04-18 NOTE — DISCHARGE INSTRUCTIONS
Your labs today are reassuring. The ultrasound shows a twin pregnancy at 10 weeks gestation. You will be sent home with medication to help with nausea and vomiting. Go to scheduled OB visit in 3 days. The ultrasound of your gall bladder shows stones and a referral was placed to general surgery. Return to ED with any new or worsening symptoms such as fevers, persistent vomiting or pain.

## 2025-04-18 NOTE — ED TRIAGE NOTES
Abd pain, chest pain, SOB and dizziness, has had symptoms for two months. Symptoms increasing in severity. Pt is two months pregnant

## 2025-06-25 NOTE — TELEPHONE ENCOUNTER
Central Prior Authorization Team   Phone: [phone]    PA Initiation    Medication: cycloSPORINE (RESTASIS) 0.05 % ophthalmic emulsion 60 each one drop each eye 2/day  Insurance Company: [email or web address] - Phone [phone] Fax [phone]  Pharmacy Filling the Rx: [facility] [address] [place] [address] - [address] AT [facility]  Filling Pharmacy Phone: [phone]  Filling Pharmacy Fax:    Start Date: 4/5/2019                    
Central Prior Authorization Team   Phone: [phone]                
Note to prior authorization team to initiate prior authorization for restasis  Tez Bello RN 7:34 AM 04/05/19        [facility] Call Center    Phone Message    May a detailed message be left on voicemail: yes    Reason for Call: Medication Question or concern regarding medication   Prescription Clarification  Name of Medication: cycloSPORINE (RESTASIS) 0.05 % ophthalmic emulsion  Prescribing Provider: Dr. Herbert   Pharmacy:    [facility] [address] [place] [address] AVE S AT [facility]     What on the order needs clarification? PT's sister said [facility] does accept pt's insurance but this medication is not covered by pt's insurance. Pharmacy needs substitute or alternative sent.          Action Taken: Message routed to:  [facility] Eye  
Prior Authorization Approval    Authorization Effective Date: 4/5/2019  Authorization Expiration Date: 4/5/2020  Medication: cycloSPORINE (RESTASIS) 0.05 % ophthalmic emulsion 60 each one drop each eye 2/day - approved  Approved Dose/Quantity:   Reference #:     Insurance Company: [employer] - Phone [phone] Fax [phone]  Expected CoPay:       CoPay Card Available:      Foundation Assistance Needed:    Which Pharmacy is filling the prescription (Not needed for infusion/clinic administered): [facility] [address] [place] [address] [address]VE [address]  Pharmacy Notified: Yes  Patient Notified: Yes    Verbal approval over the phone  
yes...

## (undated) DEVICE — GLOVE PROTEXIS MICRO 7.5  2D73PM75

## (undated) DEVICE — ESU EYE HIGH TEMP 65410-183

## (undated) DEVICE — LINEN TOWEL PACK X5 5464

## (undated) DEVICE — PEN MARKING SKIN VISIMARK 1424SR

## (undated) DEVICE — SU PLAIN 6-0 G-1DA 18" 770G

## (undated) DEVICE — SYR 03ML LL W/O NDL 309657

## (undated) DEVICE — BLADE KNIFE SURG 15 371115

## (undated) DEVICE — PACK MINOR EYE CUSTOM ASC

## (undated) DEVICE — EYE PREP BETADINE 5% SOLUTION 30ML 0065-0411-30

## (undated) DEVICE — SU SILK 4-0 G-3DA 18" 783G

## (undated) DEVICE — NDL 30GA 0.5" 305106

## (undated) DEVICE — SU PROLENE 5-0 RB-1DA 36"  8556H

## (undated) DEVICE — SU PROLENE 4-0 RB-1DA 36" 8557H

## (undated) DEVICE — SU VICRYL 5-0 S-14DA 18" J571G

## (undated) RX ORDER — ACETAMINOPHEN 325 MG/1
TABLET ORAL
Status: DISPENSED
Start: 2017-05-11

## (undated) RX ORDER — GABAPENTIN 300 MG/1
CAPSULE ORAL
Status: DISPENSED
Start: 2017-05-11

## (undated) RX ORDER — ACETAMINOPHEN 325 MG/1
TABLET ORAL
Status: DISPENSED
Start: 2017-06-08

## (undated) RX ORDER — FENTANYL CITRATE 50 UG/ML
INJECTION, SOLUTION INTRAMUSCULAR; INTRAVENOUS
Status: DISPENSED
Start: 2017-05-11

## (undated) RX ORDER — PROPOFOL 10 MG/ML
INJECTION, EMULSION INTRAVENOUS
Status: DISPENSED
Start: 2017-05-11

## (undated) RX ORDER — PROPOFOL 10 MG/ML
INJECTION, EMULSION INTRAVENOUS
Status: DISPENSED
Start: 2017-06-08